# Patient Record
Sex: FEMALE | Race: WHITE | Employment: OTHER | URBAN - METROPOLITAN AREA
[De-identification: names, ages, dates, MRNs, and addresses within clinical notes are randomized per-mention and may not be internally consistent; named-entity substitution may affect disease eponyms.]

---

## 2017-03-03 ENCOUNTER — MYC MEDICAL ADVICE (OUTPATIENT)
Dept: NEUROLOGY | Facility: CLINIC | Age: 82
End: 2017-03-03

## 2017-03-07 ENCOUNTER — TRANSFERRED RECORDS (OUTPATIENT)
Dept: HEALTH INFORMATION MANAGEMENT | Facility: CLINIC | Age: 82
End: 2017-03-07

## 2017-06-06 ENCOUNTER — OFFICE VISIT (OUTPATIENT)
Dept: NEUROLOGY | Facility: CLINIC | Age: 82
End: 2017-06-06

## 2017-06-06 VITALS
DIASTOLIC BLOOD PRESSURE: 64 MMHG | HEART RATE: 56 BPM | BODY MASS INDEX: 32.87 KG/M2 | HEIGHT: 67 IN | WEIGHT: 209.4 LBS | SYSTOLIC BLOOD PRESSURE: 146 MMHG

## 2017-06-06 DIAGNOSIS — F02.80 ALZHEIMER'S DISEASE OF OTHER ONSET WITHOUT BEHAVIORAL DISTURBANCE: Primary | ICD-10-CM

## 2017-06-06 DIAGNOSIS — G30.8 ALZHEIMER'S DISEASE OF OTHER ONSET WITHOUT BEHAVIORAL DISTURBANCE: Primary | ICD-10-CM

## 2017-06-06 RX ORDER — MULTIVIT WITH MINERALS/LUTEIN
1000 TABLET ORAL 2 TIMES DAILY
Qty: 120 CAPSULE | Refills: 1 | Status: SHIPPED | OUTPATIENT
Start: 2017-06-06 | End: 2017-08-05

## 2017-06-06 NOTE — PROGRESS NOTES
Mrs. Craig is a very pleasant older lady with a diagnosis of mild cognitive impairment whom I last saw six months ago.  She was accompanied today by her  and her two daughters.  She is currently taking donepezil 10 mg a day; the family feels that although the medication had little effect at 5 mg a day, increase the dose was associated with improvement.  Mrs. Craig lives with her  in the family home in [place] and is in close contact with her two daughters who visit at least every week.  Her son about 2 miles away.  There have not been any dramatic changes since the last clinic visit through her  noted that she is more likely to become confused particularly when they engage in non routine activities.  Mrs. Craig is able to dress, take care of her own toilet and walk around without assistance; her  has taken over all the household activities including cooking.  The patient gets good sleep though she also takes a few naps each day.  She does not wander, has not fallen and has not experienced hallucinations or disturbing dreams.    Medications (in addition to the donepezil) include simvastatin, lisinopril, aspirin, diltiazem, Vit. D and other dietary supplements. She does not take Vit. E.    On exam, Mrs. Craig was very jolly and maintains conversations quite easily.  I performed a brief cognitive evaluation using the Kokman test on which she scored 23 out of a possible 38.  Most of the deficits were in short term recall and in mental computations.  She did not look parkinsonian and had no involuntary movements.  There was a mild action tremor in the left upper limb.  Hand dexterity was normal bilaterally. When asked to walk, she manoj easily from the chair and walked with a normal stride, and good postural stability.    Although Mrs. Craig was previously diagnosed as having mild cognitive impairment, my assessment and the results of her last CPT evaluation suggest that she is in  the mild-moderate dementia range.  I examined the brain MRI taken in May 2016 which shows mild generalized cortical atrophy, probable increased ventricular volume and white matter abnormalities in the centrum semiovale bilaterally and also periventricularly (especially at the apices of the lateral ventricles).     The pattern of her cognitive deficits is suggestive of Alzheimer disease type dementia. I discussed potential symptomatic treatment options with the family explaining that I did not see a benefit in substituting another cholinesterase inhibitor for the donepezil that she currently takes.  I suggested that she take vitamin E 1000 BID and recommended that they might also consider using coconut oil.  I explained the potential benefits of behaviors such as regular exercise and socialization.  Her  is currently able to provide all the care that she needs and he received additional support from his two daughters.  I would like to see patient again in 1 year.

## 2017-06-06 NOTE — MR AVS SNAPSHOT
"              After Visit Summary   6/6/2017    Chris Craig    MRN: [id number]           Patient Information     Date Of Birth          5/21/1934        Visit Information        Provider Department      6/6/2017 1:00 PM Magdi Fitch MD [facility]         Follow-ups after your visit        Who to contact     Please call your clinic at [phone] to:    Ask questions about your health    Make or cancel appointments    Discuss your medicines    Learn about your test results    Speak to your doctor   If you have compliments or concerns about an experience at your clinic, or if you wish to file a complaint, please contact [facility] Patient Relations at [phone] or email us at [email or web address]         Additional Information About Your Visit        MyChart Information     [employer] gives you secure access to your electronic health record. If you see a primary care provider, you can also send messages to your care team and make appointments. If you have questions, please call your primary care clinic.  If you do not have a primary care provider, please call [phone] and they will assist you.      [employer] is an electronic gateway that provides easy, online access to your medical records. With [employer] you can request a clinic appointment, read your test results, renew a prescription or communicate with your care team.     To access your existing account, please contact your [facility] or call [phone] for assistance.        Care EveryWhere ID     This is your Care EveryWhere ID. This could be used by other organizations to access your [facility] medical records  [id number]        Your Vitals Were     Pulse Height BMI (Body Mass Index)             56 5' 7\" (170.2 cm) 32.8 kg/m2          Blood Pressure from Last 3 Encounters:   06/06/17 146/64   11/22/16 160/67   10/21/16 181/74    Weight from Last 3 Encounters:   06/06/17 209 lb " 6.4 oz (95 kg)   11/22/16 214 lb 9.6 oz (97.3 kg)   10/21/16 210 lb (95.3 kg)              Today, you had the following     No orders found for display       Primary Care Provider Office Phone # Fax #    Ti Dale [phone]       [facility] [address]  [place] [address]        Thank you!     Thank you for choosing [facility]  for your care. Our goal is always to provide you with excellent care. Hearing back from our patients is one way we can continue to improve our services. Please take a few minutes to complete the written survey that you may receive in the mail after your visit with us. Thank you!             Your Updated Medication List - Protect others around you: Learn how to safely use, store and throw away your medicines at www.disposemymeds.org.          This list is accurate as of: 6/6/17  2:05 PM.  Always use your most recent med list.                   Brand Name Dispense Instructions for use    aspirin 325 MG tablet      Take 325 mg by mouth daily.       CALCIUM 600+D PO      Take 2 tablets by mouth daily       diltiazem 360 MG 24 hr CD capsule    CARDIZEM CD; CARTIA XT     Take 360 mg by mouth daily       donepezil 10 MG tablet    ARICEPT    90 tablet    Take 1 tablet (10 mg) by mouth At Bedtime       LISINOPRIL PO      Take 20 mg by mouth daily       SENIOR MULTIVITAMIN PLUS Tabs      Take 1 tablet by mouth daily       simvastatin 20 MG tablet    ZOCOR     Take 20 mg by mouth At Bedtime.       * VITAMIN D (CHOLECALCIFEROL) PO      Take 2,000 Units by mouth daily       * VITAMIN D PO      Take 50,000 Units by mouth every 30 days       * Notice:  This list has 2 medication(s) that are the same as other medications prescribed for you. Read the directions carefully, and ask your doctor or other care provider to review them with you.

## 2017-06-06 NOTE — LETTER
6/6/2017       RE: Chris Craig  [address]  [facility] [address]     Dear Colleague,    Thank you for referring your patient, Chris Craig, to the [facility] at [facility]. Please see a copy of my visit note below.    Mrs. Craig is a very pleasant older lady with a diagnosis of mild cognitive impairment whom I last saw six months ago.  She was accompanied today by her  and her two daughters.  She is currently taking donepezil 10 mg a day; the family feels that although the medication had little effect at 5 mg a day, increase the dose was associated with improvement.  Mrs. Craig lives with her  in the family home in [place] and is in close contact with her two daughters who visit at least every week.  Her son about 2 miles away.  There have not been any dramatic changes since the last clinic visit through her  noted that she is more likely to become confused particularly when they engage in non routine activities.  Mrs. Craig is able to dress, take care of her own toilet and walk around without assistance; her  has taken over all the household activities including cooking.  The patient gets good sleep though she also takes a few naps each day.  She does not wander, has not fallen and has not experienced hallucinations or disturbing dreams.    Medications (in addition to the donepezil) include simvastatin, lisinopril, aspirin, diltiazem, Vit. D and other dietary supplements. She does not take Vit. E.    On exam, Mrs. Craig was very jolly and maintains conversations quite easily.  I performed a brief cognitive evaluation using the Kokman test on which she scored 23 out of a possible 38.  Most of the deficits were in short term recall and in mental computations.  She did not look parkinsonian and had no involuntary movements.  There was a mild action tremor in the left upper limb.  Hand dexterity was normal  bilaterally. When asked to walk, she manoj easily from the chair and walked with a normal stride, and good postural stability.    Although Mrs. Craig was previously diagnosed as having mild cognitive impairment, my assessment and the results of her last CPT evaluation suggest that she is in the mild-moderate dementia range.  I examined the brain MRI taken in May 2016 which shows mild generalized cortical atrophy, probable increased ventricular volume and white matter abnormalities in the centrum semiovale bilaterally and also periventricularly (especially at the apices of the lateral ventricles).     The pattern of her cognitive deficits is suggestive of Alzheimer disease type dementia. I discussed potential symptomatic treatment options with the family explaining that I did not see a benefit in substituting another cholinesterase inhibitor for the donepezil that she currently takes.  I suggested that she take vitamin E 1000 BID and recommended that they might also consider using coconut oil.  I explained the potential benefits of behaviors such as regular exercise and socialization.  Her  is currently able to provide all the care that she needs and he received additional support from his two daughters.  I would like to see patient again in 1 year.    Magdi Fitch MD

## 2017-10-04 ASSESSMENT — ENCOUNTER SYMPTOMS
DEPRESSION: 0
HALLUCINATIONS: 0
HOT FLASHES: 0
POLYPHAGIA: 0
PANIC: 0
INCREASED ENERGY: 1
WEIGHT GAIN: 0
FATIGUE: 1
NIGHT SWEATS: 0
ALTERED TEMPERATURE REGULATION: 1
FEVER: 0
DYSURIA: 0
FLANK PAIN: 0
NERVOUS/ANXIOUS: 0
DIFFICULTY URINATING: 1
DECREASED LIBIDO: 0
DECREASED CONCENTRATION: 1
CHILLS: 1
HEMATURIA: 0
INSOMNIA: 0
DECREASED APPETITE: 0
WEIGHT LOSS: 0
POLYDIPSIA: 0

## 2017-10-05 DIAGNOSIS — G31.84 MCI (MILD COGNITIVE IMPAIRMENT): ICD-10-CM

## 2017-10-05 RX ORDER — DONEPEZIL HYDROCHLORIDE 10 MG/1
TABLET, FILM COATED ORAL
Qty: 30 TABLET | Refills: 0 | Status: SHIPPED | OUTPATIENT
Start: 2017-10-05 | End: 2020-01-01

## 2017-10-18 ENCOUNTER — OFFICE VISIT (OUTPATIENT)
Dept: UROLOGY | Facility: CLINIC | Age: 82
End: 2017-10-18
Payer: MEDICARE

## 2017-10-18 VITALS — BODY MASS INDEX: 32.18 KG/M2 | OXYGEN SATURATION: 97 % | HEART RATE: 60 BPM | HEIGHT: 67 IN | WEIGHT: 205 LBS

## 2017-10-18 DIAGNOSIS — R82.90 ABNORMAL URINE ODOR: Primary | ICD-10-CM

## 2017-10-18 LAB
ALBUMIN UR-MCNC: ABNORMAL MG/DL
APPEARANCE UR: CLEAR
BILIRUB UR QL STRIP: NEGATIVE
COLOR UR AUTO: YELLOW
GLUCOSE UR STRIP-MCNC: NEGATIVE MG/DL
HGB UR QL STRIP: NEGATIVE
KETONES UR STRIP-MCNC: NEGATIVE MG/DL
LEUKOCYTE ESTERASE UR QL STRIP: ABNORMAL
NITRATE UR QL: POSITIVE
PH UR STRIP: 7 PH (ref 5–7)
SOURCE: ABNORMAL
SP GR UR STRIP: 1.01 (ref 1–1.03)
UROBILINOGEN UR STRIP-ACNC: 2 EU/DL (ref 0.2–1)

## 2017-10-18 PROCEDURE — 99202 OFFICE O/P NEW SF 15 MIN: CPT | Performed by: PHYSICIAN ASSISTANT

## 2017-10-18 PROCEDURE — 87109 MYCOPLASMA: CPT | Performed by: PHYSICIAN ASSISTANT

## 2017-10-18 PROCEDURE — 87088 URINE BACTERIA CULTURE: CPT | Performed by: PHYSICIAN ASSISTANT

## 2017-10-18 PROCEDURE — 87186 SC STD MICRODIL/AGAR DIL: CPT | Performed by: PHYSICIAN ASSISTANT

## 2017-10-18 PROCEDURE — 87086 URINE CULTURE/COLONY COUNT: CPT | Performed by: PHYSICIAN ASSISTANT

## 2017-10-18 PROCEDURE — 81003 URINALYSIS AUTO W/O SCOPE: CPT | Mod: QW | Performed by: PHYSICIAN ASSISTANT

## 2017-10-18 ASSESSMENT — PAIN SCALES - GENERAL: PAINLEVEL: NO PAIN (0)

## 2017-10-18 NOTE — PROGRESS NOTES
October 18, 2017    CC: Foul-smelling urine    HPI:  Chris Craig is a pleasant 83 year old female who presents via referral from Dr. Dale with foul-smelling urine.  She was recently seen by Dr. dale on 824/16 and treated for a urinary tract infection with ciprofloxacin for seven days.  Patient is present with her  today who aids in taking her history, given her short-term memory loss issues.    Patient herself has no concerns today.  No dysuria, hematuria, frequency, nocturia.   notes a foul odor and discharge in her panty liner daily.  She drinks 3 cups of coffee in the morning and just enough water to take her pills.  Has not had a gynecologic exam for many years and had a hysterectomy many years ago.      Past Medical History:   Diagnosis Date     Arthritis      Complication of anesthesia      H/O transfusion     but not entire;y sure     Hypertension      Mumps        Past Surgical History:   Procedure Laterality Date     BLADDER SURGERY       BUNIONECTOMY RT/LT       C OPEN RX ANKLE DISLOCATN+FIXATN      right     COLONOSCOPY       COLONOSCOPY  3/13/2012    Procedure:COLONOSCOPY; COLONOSCOPY ; Surgeon:LUCAS MOODY; Location: GI     GYN SURGERY      hysterectomy     HC REMOVAL OF TONSILS,<13 Y/O       OPEN REDUCTION INTERNAL FIXATION ANKLE Right 3/30/2016    Procedure: OPEN REDUCTION INTERNAL FIXATION ANKLE;  Surgeon: Denis Gaffney MD;  Location:  OR       Social History     Social History     Marital status:      Spouse name: N/A     Number of children: N/A     Years of education: N/A     Occupational History     Not on file.     Social History Main Topics     Smoking status: Never Smoker     Smokeless tobacco: Never Used     Alcohol use 0.0 oz/week     0 Standard drinks or equivalent per week      Comment: one drink per week     Drug use: No     Sexual activity: No     Other Topics Concern     Not on file     Social History Narrative       History reviewed. No  "pertinent family history.    ROS:14 point ROS neg other than the symptoms noted above in the HPI.    Allergies   Allergen Reactions     Sulfa Drugs      hives       Current Outpatient Prescriptions   Medication     donepezil (ARICEPT) 10 MG tablet     LISINOPRIL PO     VITAMIN D, CHOLECALCIFEROL, PO     aspirin 325 MG tablet     Multiple Vitamins-Minerals (SENIOR MULTIVITAMIN PLUS) TABS     Calcium Carbonate-Vitamin D (CALCIUM 600+D PO)     VITAMIN D PO     simvastatin (ZOCOR) 20 MG tablet     diltiazem (CARDIZEM CD; CARTIA XT) 360 MG 24 hr CD capsule     No current facility-administered medications for this visit.          PEx:   Pulse 60, height 1.702 m (5' 7\"), weight 93 kg (205 lb), SpO2 97 %, not currently breastfeeding.  5' 7\", Body mass index is 32.11 kg/(m^2)., 205 lbs 0 oz  Gen appearance:  Well groomed,: age-appropriate appearing female in NAD.   HEENT:  EOMI, AT NC, CN grossly normal  Psych:  alert , comfortable in no acute distress  Neuro:  A/O X 3  Skin:  Warm to touch, clear of rashes, ecchymoses  Resp:  No increased respiratory effort  lymph:  No LE edema  Abd:  Soft/NT, ND, no palpable masses, no CVAT  Back: bony spine is non-tender, flanks are nontender    Urine: Nitrite positive, small leukoesterase, negative blood.      A/P: Chris Craig is a 83 year old female with foul-smelling urine.  Await culture, Mycoplasma and Ureaplasma. Hydrate.   If negative, would suggest she see primary care gynecologist for vaginal exam.  Patient  seemed happy with this.  Will notify them as results finalize.    Rose Martinez PA-C  [facility]    20 minutes were spent with the patient today, > 50% in counseling and coordination of care                "

## 2017-10-18 NOTE — MR AVS SNAPSHOT
After Visit Summary   10/18/2017    Chris Craig    MRN: [id number]           Patient Information     Date Of Birth          5/21/1934        Visit Information        Provider Department      10/18/2017 3:00 PM Rose Martinez PA-C [facility] [place]        Today's Diagnoses     Abnormal urine odor    -  1       Follow-ups after your visit        Your next 10 appointments already scheduled     Feb 09, 2018  1:00 PM CST   RETURN EXTENDED with Natalio Gray MD   [facility] ([facility])    [address]  [address] [place] [address] [phone]              Who to contact     If you have questions or need follow up information about today's clinic visit or your schedule please contact [facility] [place] directly at [phone].  Normal or non-critical lab and imaging results will be communicated to you by MyChart, letter or phone within 4 business days after the clinic has received the results. If you do not hear from us within 7 days, please contact the clinic through MyChart or phone. If you have a critical or abnormal lab result, we will notify you by phone as soon as possible.  Submit refill requests through [employer] or call your pharmacy and they will forward the refill request to us. Please allow 3 business days for your refill to be completed.          Additional Information About Your Visit        MyChart Information     [employer] gives you secure access to your electronic health record. If you see a primary care provider, you can also send messages to your care team and make appointments. If you have questions, please call your primary care clinic.  If you do not have a primary care provider, please call [phone] and they will assist you.        Care EveryWhere ID     This is your Care EveryWhere ID. This could be used by other organizations to access your [facility]  "medical records  [id number]        Your Vitals Were     Pulse Height Pulse Oximetry BMI (Body Mass Index)          60 1.702 m (5' 7\") 97% 32.11 kg/m2         Blood Pressure from Last 3 Encounters:   06/06/17 146/64   11/22/16 160/67   10/21/16 181/74    Weight from Last 3 Encounters:   10/18/17 93 kg (205 lb)   06/06/17 95 kg (209 lb 6.4 oz)   11/22/16 97.3 kg (214 lb 9.6 oz)              We Performed the Following     Mycoplasma large colony culture [[id number]]     UA without Microscopic     Ureaplasma culture [[id number]]     Urine Culture Aerobic Bacterial [[id number]]        Primary Care Provider Office Phone # Fax #    Ti Dale [phone] [address] [facility]   [place] [address]        Equal Access to Services     [facility]: Hadii aad blayne hadasho Socezar, waaxda luqadaha, qaybta kaalmada adeegyada, florentino aponte hayearline farias . So [facility] [phone].    ATENCIÓN: Si habla español, tiene a van disposición servicios gratuitos de asistencia lingüística. Llame al [phone].    We comply with applicable federal civil rights laws and [place] laws. We do not discriminate on the basis of race, color, national origin, age, disability, sex, sexual orientation, or gender identity.            Thank you!     Thank you for choosing [facility] [place]  for your care. Our goal is always to provide you with excellent care. Hearing back from our patients is one way we can continue to improve our services. Please take a few minutes to complete the written survey that you may receive in the mail after your visit with us. Thank you!             Your Updated Medication List - Protect others around you: Learn how to safely use, store and throw away your medicines at www.disposemymeds.org.          This list is accurate as of: 10/18/17  3:19 PM.  Always use your most recent med list.                   Brand Name Dispense Instructions for use " Diagnosis    aspirin 325 MG tablet      Take 325 mg by mouth daily.        CALCIUM 600+D PO      Take 2 tablets by mouth daily        diltiazem 360 MG 24 hr CD capsule    CARDIZEM CD; CARTIA XT     Take 360 mg by mouth daily        donepezil 10 MG tablet    ARICEPT    30 tablet    TAKE 1 TABLET (10 MG) BY MOUTH AT BEDTIME    MCI (mild cognitive impairment)       LISINOPRIL PO      Take 20 mg by mouth daily        SENIOR MULTIVITAMIN PLUS Tabs      Take 1 tablet by mouth daily        simvastatin 20 MG tablet    ZOCOR     Take 20 mg by mouth At Bedtime.        * VITAMIN D (CHOLECALCIFEROL) PO      Take 2,000 Units by mouth daily        * VITAMIN D PO      Take 50,000 Units by mouth every 30 days        * Notice:  This list has 2 medication(s) that are the same as other medications prescribed for you. Read the directions carefully, and ask your doctor or other care provider to review them with you.

## 2017-10-18 NOTE — LETTER
10/18/2017       RE: Chris Craig  [address]  [place] [address]     Dear Colleague,    Thank you for referring your patient, Chris Craig, to the [facility] [place] at [facility]. Please see a copy of my visit note below.    October 18, 2017    CC: Foul-smelling urine    HPI:  Chris Craig is a pleasant 83 year old female who presents via referral from Dr. Dale with foul-smelling urine.  She was recently seen by Dr. dale on 824/16 and treated for a urinary tract infection with ciprofloxacin for seven days.  Patient is present with her  today who aids in taking her history, given her short-term memory loss issues.    Patient herself has no concerns today.  No dysuria, hematuria, frequency, nocturia.   notes a foul odor and discharge in her panty liner daily.  She drinks 3 cups of coffee in the morning and just enough water to take her pills.  Has not had a gynecologic exam for many years and had a hysterectomy many years ago.      Past Medical History:   Diagnosis Date     Arthritis      Complication of anesthesia      H/O transfusion     but not entire;y sure     Hypertension      Mumps        Past Surgical History:   Procedure Laterality Date     BLADDER SURGERY       BUNIONECTOMY RT/LT       C OPEN RX ANKLE DISLOCATN+FIXATN      right     COLONOSCOPY       COLONOSCOPY  3/13/2012    Procedure:COLONOSCOPY; COLONOSCOPY ; Surgeon:LUCAS MOODY; Location: GI     GYN SURGERY      hysterectomy     HC REMOVAL OF TONSILS,<13 Y/O       OPEN REDUCTION INTERNAL FIXATION ANKLE Right 3/30/2016    Procedure: OPEN REDUCTION INTERNAL FIXATION ANKLE;  Surgeon: Denis Gaffney MD;  Location:  OR       Social History     Social History     Marital status:      Spouse name: N/A     Number of children: N/A     Years of education: N/A     Occupational History     Not on file.     Social  "History Main Topics     Smoking status: Never Smoker     Smokeless tobacco: Never Used     Alcohol use 0.0 oz/week     0 Standard drinks or equivalent per week      Comment: one drink per week     Drug use: No     Sexual activity: No     Other Topics Concern     Not on file     Social History Narrative       History reviewed. No pertinent family history.    ROS:14 point ROS neg other than the symptoms noted above in the HPI.    Allergies   Allergen Reactions     Sulfa Drugs      hives       Current Outpatient Prescriptions   Medication     donepezil (ARICEPT) 10 MG tablet     LISINOPRIL PO     VITAMIN D, CHOLECALCIFEROL, PO     aspirin 325 MG tablet     Multiple Vitamins-Minerals (SENIOR MULTIVITAMIN PLUS) TABS     Calcium Carbonate-Vitamin D (CALCIUM 600+D PO)     VITAMIN D PO     simvastatin (ZOCOR) 20 MG tablet     diltiazem (CARDIZEM CD; CARTIA XT) 360 MG 24 hr CD capsule     No current facility-administered medications for this visit.          PEx:   Pulse 60, height 1.702 m (5' 7\"), weight 93 kg (205 lb), SpO2 97 %, not currently breastfeeding.  5' 7\", Body mass index is 32.11 kg/(m^2)., 205 lbs 0 oz  Gen appearance:  Well groomed,: age-appropriate appearing female in NAD.   HEENT:  EOMI, AT NC, CN grossly normal  Psych:  alert , comfortable in no acute distress  Neuro:  A/O X 3  Skin:  Warm to touch, clear of rashes, ecchymoses  Resp:  No increased respiratory effort  lymph:  No LE edema  Abd:  Soft/NT, ND, no palpable masses, no CVAT  Back: bony spine is non-tender, flanks are nontender    Urine: Nitrite positive, small leukoesterase, negative blood.      A/P: Chris MINOO Craig is a 83 year old female with foul-smelling urine.  Await culture, Mycoplasma and Ureaplasma. Hydrate.   If negative, would suggest she see primary care gynecologist for vaginal exam.  Patient  seemed happy with this.  Will notify them as results finalize.    Rose Martinez PA-C  [facility]    20 minutes were spent with the " patient today, > 50% in counseling and coordination of care      Again, thank you for allowing me to participate in the care of your patient.      Sincerely,    Rose Martinez PA-C, ANJELICA

## 2017-10-20 DIAGNOSIS — N39.0 URINARY TRACT INFECTION: Primary | ICD-10-CM

## 2017-10-20 LAB
BACTERIA SPEC CULT: ABNORMAL
BACTERIA SPEC CULT: ABNORMAL
Lab: ABNORMAL
SPECIMEN SOURCE: ABNORMAL

## 2017-10-20 RX ORDER — CIPROFLOXACIN 500 MG/1
500 TABLET, FILM COATED ORAL 2 TIMES DAILY
Qty: 14 TABLET | Refills: 0 | Status: SHIPPED | OUTPATIENT
Start: 2017-10-20 | End: 2017-10-27

## 2017-10-25 DIAGNOSIS — A64 STI (SEXUALLY TRANSMITTED INFECTION): Primary | ICD-10-CM

## 2017-10-25 LAB
BACTERIA SPEC CULT: ABNORMAL
BACTERIA SPEC CULT: NORMAL
Lab: ABNORMAL
Lab: NORMAL
SPECIMEN SOURCE: ABNORMAL
SPECIMEN SOURCE: NORMAL

## 2017-10-25 RX ORDER — DOXYCYCLINE 100 MG/1
100 TABLET ORAL 2 TIMES DAILY
Qty: 20 TABLET | Refills: 0 | Status: SHIPPED | OUTPATIENT
Start: 2017-10-25 | End: 2017-11-04

## 2017-10-25 NOTE — PROGRESS NOTES
Called patient and informed her of ureaplasma infection and sent ABT to patient's preferred pharmacy. She was then transferred to the  to schedule a follow-up appointment. Naomi Fagan LPN

## 2017-11-22 ENCOUNTER — OFFICE VISIT (OUTPATIENT)
Dept: UROLOGY | Facility: CLINIC | Age: 82
End: 2017-11-22
Payer: MEDICARE

## 2017-11-22 VITALS — OXYGEN SATURATION: 97 % | HEART RATE: 54 BPM | HEIGHT: 67 IN | WEIGHT: 209 LBS | BODY MASS INDEX: 32.8 KG/M2

## 2017-11-22 DIAGNOSIS — R82.90 ABNORMAL URINE ODOR: Primary | ICD-10-CM

## 2017-11-22 LAB
ALBUMIN UR-MCNC: NEGATIVE MG/DL
APPEARANCE UR: CLEAR
BILIRUB UR QL STRIP: NEGATIVE
COLOR UR AUTO: YELLOW
GLUCOSE UR STRIP-MCNC: NEGATIVE MG/DL
HGB UR QL STRIP: NEGATIVE
KETONES UR STRIP-MCNC: NEGATIVE MG/DL
LEUKOCYTE ESTERASE UR QL STRIP: NEGATIVE
NITRATE UR QL: NEGATIVE
PH UR STRIP: 7.5 PH (ref 5–7)
SOURCE: ABNORMAL
SP GR UR STRIP: 1.01 (ref 1–1.03)
UROBILINOGEN UR STRIP-ACNC: 1 EU/DL (ref 0.2–1)

## 2017-11-22 PROCEDURE — 99212 OFFICE O/P EST SF 10 MIN: CPT | Performed by: PHYSICIAN ASSISTANT

## 2017-11-22 PROCEDURE — 81003 URINALYSIS AUTO W/O SCOPE: CPT | Mod: QW | Performed by: PHYSICIAN ASSISTANT

## 2017-11-22 ASSESSMENT — PAIN SCALES - GENERAL: PAINLEVEL: NO PAIN (0)

## 2017-11-22 NOTE — LETTER
"11/22/2017     RE: Chris Craig  [address]  [facility] [address]     Dear Colleague,    Thank you for referring your patient, Chris Craig, to the [facility]OLOG[facility] [place] at [facility]. Please see a copy of my visit note below.    October 18, 2017    CC: Foul-smelling urine    HPI:  Chris Craig is a pleasant 83 year old female who presents via follow up of \"foul-smelling urine\".  She was recently seen by Dr. caraballo on 8/24/16 and treated for a urinary tract infection with ciprofloxacin for seven days.  Patient is present with her  today who aids in taking her history, given her short-term memory loss issues.    Patient herself has no concerns today.  Was treated for Ureaplasma and E coli UTI in the interim. No dysuria, hematuria, frequency, nocturia.   notes a foul odor intermittently.  She drinks 3 cups of coffee in the morning and just enough water to take her pills.  Has not had a gynecologic exam for many years and had a hysterectomy many years ago.      Past Medical History:   Diagnosis Date     Arthritis      Complication of anesthesia      H/O transfusion     but not entire;y sure     Hypertension      Mumps        Past Surgical History:   Procedure Laterality Date     BLADDER SURGERY       BUNIONECTOMY RT/LT       C OPEN RX ANKLE DISLOCATN+FIXATN      right     COLONOSCOPY       COLONOSCOPY  3/13/2012    Procedure:COLONOSCOPY; COLONOSCOPY ; Surgeon:LUCAS MOODY; Location: GI     GYN SURGERY      hysterectomy     HC REMOVAL OF TONSILS,<11 Y/O       OPEN REDUCTION INTERNAL FIXATION ANKLE Right 3/30/2016    Procedure: OPEN REDUCTION INTERNAL FIXATION ANKLE;  Surgeon: Denis Gaffney MD;  Location:  OR       Social History     Social History     Marital status:      Spouse name: N/A     Number of children: N/A     Years of education: N/A     Occupational History     Not on " "file.     Social History Main Topics     Smoking status: Never Smoker     Smokeless tobacco: Never Used     Alcohol use 0.0 oz/week     0 Standard drinks or equivalent per week      Comment: one drink per week     Drug use: No     Sexual activity: No     Other Topics Concern     Not on file     Social History Narrative       History reviewed. No pertinent family history.    ROS:14 point ROS neg other than the symptoms noted above in the HPI.    Allergies   Allergen Reactions     Sulfa Drugs      hives       Current Outpatient Prescriptions   Medication     donepezil (ARICEPT) 10 MG tablet     LISINOPRIL PO     VITAMIN D, CHOLECALCIFEROL, PO     aspirin 325 MG tablet     Multiple Vitamins-Minerals (SENIOR MULTIVITAMIN PLUS) TABS     Calcium Carbonate-Vitamin D (CALCIUM 600+D PO)     VITAMIN D PO     simvastatin (ZOCOR) 20 MG tablet     diltiazem (CARDIZEM CD; CARTIA XT) 360 MG 24 hr CD capsule     No current facility-administered medications for this visit.          PEx:   Pulse 54, height 1.702 m (5' 7\"), weight 94.8 kg (209 lb), SpO2 97 %, not currently breastfeeding.  5' 7\", Body mass index is 32.73 kg/(m^2)., 209 lbs 0 oz  Gen appearance:  Well groomed,: age-appropriate appearing female in NAD.   HEENT:  EOMI, AT NC, CN grossly normal  Psych:  alert , comfortable in no acute distress  Neuro:  A/O X 3  Skin:  Warm to touch, clear of rashes, ecchymoses  Resp:  No increased respiratory effort  lymph:  No LE edema  Abd:  Soft/NT, ND, no palpable masses, no CVAT  Back: bony spine is non-tender, flanks are nontender    Urine: negative      A/P: Chris MINOO Craig is a 83 year old female with foul-smelling odor.  Hydrate, concentrated urine may be some of the issue.   Would suggest she see primary care or gynecologist for vaginal exam.  Patient  seemed happy with this.  Will see them prn.    Rose Martinez PA-C  [facility]    20 minutes were spent with the patient today, > 50% in counseling and coordination " of care    Again, thank you for allowing me to participate in the care of your patient.      Sincerely,    Rose Martinez PA-C

## 2017-11-22 NOTE — MR AVS SNAPSHOT
After Visit Summary   11/22/2017    Chris Craig    MRN: [id number]           Patient Information     Date Of Birth          5/21/1934        Visit Information        Provider Department      11/22/2017 1:00 PM Rose Martinez PA-C [facility] [place]        Today's Diagnoses     Abnormal urine odor    -  1       Follow-ups after your visit        Your next 10 appointments already scheduled     Feb 09, 2018  1:30 PM CST   RETURN EXTENDED with Natalio Gray MD   [facility]UROSPECIALT[facility] ([facility])    [address] [facility] [address]  [address] [place] [address] [phone]              Who to contact     If you have questions or need follow up information about today's clinic visit or your schedule please contact [facility]OLOG[facility] CLINIC [place] directly at [phone].  Normal or non-critical lab and imaging results will be communicated to you by MyChart, letter or phone within 4 business days after the clinic has received the results. If you do not hear from us within 7 days, please contact the clinic through MyChart or phone. If you have a critical or abnormal lab result, we will notify you by phone as soon as possible.  Submit refill requests through [employer] or call your pharmacy and they will forward the refill request to us. Please allow 3 business days for your refill to be completed.          Additional Information About Your Visit        MyChart Information     [employer] gives you secure access to your electronic health record. If you see a primary care provider, you can also send messages to your care team and make appointments. If you have questions, please call your primary care clinic.  If you do not have a primary care provider, please call [phone] and they will assist you.        Care EveryWhere ID     This is your Care EveryWhere ID. This could be used by other organizations to access your [place]  "medical records  [id number]        Your Vitals Were     Pulse Height Pulse Oximetry BMI (Body Mass Index)          54 1.702 m (5' 7\") 97% 32.73 kg/m2         Blood Pressure from Last 3 Encounters:   06/06/17 146/64   11/22/16 160/67   10/21/16 181/74    Weight from Last 3 Encounters:   11/22/17 94.8 kg (209 lb)   10/18/17 93 kg (205 lb)   06/06/17 95 kg (209 lb 6.4 oz)              We Performed the Following     UA without Microscopic        Primary Care Provider Office Phone # Fax #    [place] [phone] [address] [address]        Equal Access to Services     SUNITA VENTURA : Hadii neda prietoo Socezar, waaxda luqadaha, qaybta kaalmada adeegyada, florentino farias . So [facility] [phone].    ATENCIÓN: Si habla español, tiene a van disposición servicios gratuitos de asistencia lingüística. Llame al [phone].    We comply with applicable federal civil rights laws and [place] laws. We do not discriminate on the basis of race, color, national origin, age, disability, sex, sexual orientation, or gender identity.            Thank you!     Thank you for choosing [facility]OLOG[facility] [place]  for your care. Our goal is always to provide you with excellent care. Hearing back from our patients is one way we can continue to improve our services. Please take a few minutes to complete the written survey that you may receive in the mail after your visit with us. Thank you!             Your Updated Medication List - Protect others around you: Learn how to safely use, store and throw away your medicines at www.disposemymeds.org.          This list is accurate as of: 11/22/17  1:13 PM.  Always use your most recent med list.                   Brand Name Dispense Instructions for use Diagnosis    aspirin 325 MG tablet      Take 325 mg by mouth daily.        CALCIUM 600+D PO      Take 2 tablets by mouth daily        " diltiazem 360 MG 24 hr CD capsule    CARDIZEM CD; CARTIA XT     Take 360 mg by mouth daily        donepezil 10 MG tablet    ARICEPT    30 tablet    TAKE 1 TABLET (10 MG) BY MOUTH AT BEDTIME    MCI (mild cognitive impairment)       LISINOPRIL PO      Take 20 mg by mouth daily        SENIOR MULTIVITAMIN PLUS Tabs      Take 1 tablet by mouth daily        simvastatin 20 MG tablet    ZOCOR     Take 20 mg by mouth At Bedtime.        * VITAMIN D (CHOLECALCIFEROL) PO      Take 2,000 Units by mouth daily        * VITAMIN D PO      Take 50,000 Units by mouth every 30 days        * Notice:  This list has 2 medication(s) that are the same as other medications prescribed for you. Read the directions carefully, and ask your doctor or other care provider to review them with you.

## 2017-11-22 NOTE — NURSING NOTE
pts  states pt is still having smelly urine.  Pts  states she doesn't drink much water.  ANUP Pitts, CMA

## 2017-11-22 NOTE — PROGRESS NOTES
"October 18, 2017    CC: Foul-smelling urine    HPI:  Chris Craig is a pleasant 83 year old female who presents via follow up of \"foul-smelling urine\".  She was recently seen by Dr. caraballo on 8/24/16 and treated for a urinary tract infection with ciprofloxacin for seven days.  Patient is present with her  today who aids in taking her history, given her short-term memory loss issues.    Patient herself has no concerns today.  Was treated for Ureaplasma and E coli UTI in the interim. No dysuria, hematuria, frequency, nocturia.   notes a foul odor intermittently.  She drinks 3 cups of coffee in the morning and just enough water to take her pills.  Has not had a gynecologic exam for many years and had a hysterectomy many years ago.      Past Medical History:   Diagnosis Date     Arthritis      Complication of anesthesia      H/O transfusion     but not entire;y sure     Hypertension      Mumps        Past Surgical History:   Procedure Laterality Date     BLADDER SURGERY       BUNIONECTOMY RT/LT       C OPEN RX ANKLE DISLOCATN+FIXATN      right     COLONOSCOPY       COLONOSCOPY  3/13/2012    Procedure:COLONOSCOPY; COLONOSCOPY ; Surgeon:LUCAS MOODY; Location: GI     GYN SURGERY      hysterectomy     HC REMOVAL OF TONSILS,<11 Y/O       OPEN REDUCTION INTERNAL FIXATION ANKLE Right 3/30/2016    Procedure: OPEN REDUCTION INTERNAL FIXATION ANKLE;  Surgeon: Denis Gaffney MD;  Location:  OR       Social History     Social History     Marital status:      Spouse name: N/A     Number of children: N/A     Years of education: N/A     Occupational History     Not on file.     Social History Main Topics     Smoking status: Never Smoker     Smokeless tobacco: Never Used     Alcohol use 0.0 oz/week     0 Standard drinks or equivalent per week      Comment: one drink per week     Drug use: No     Sexual activity: No     Other Topics Concern     Not on file     Social History Narrative " "      History reviewed. No pertinent family history.    ROS:14 point ROS neg other than the symptoms noted above in the HPI.    Allergies   Allergen Reactions     Sulfa Drugs      hives       Current Outpatient Prescriptions   Medication     donepezil (ARICEPT) 10 MG tablet     LISINOPRIL PO     VITAMIN D, CHOLECALCIFEROL, PO     aspirin 325 MG tablet     Multiple Vitamins-Minerals (SENIOR MULTIVITAMIN PLUS) TABS     Calcium Carbonate-Vitamin D (CALCIUM 600+D PO)     VITAMIN D PO     simvastatin (ZOCOR) 20 MG tablet     diltiazem (CARDIZEM CD; CARTIA XT) 360 MG 24 hr CD capsule     No current facility-administered medications for this visit.          PEx:   Pulse 54, height 1.702 m (5' 7\"), weight 94.8 kg (209 lb), SpO2 97 %, not currently breastfeeding.  5' 7\", Body mass index is 32.73 kg/(m^2)., 209 lbs 0 oz  Gen appearance:  Well groomed,: age-appropriate appearing female in NAD.   HEENT:  EOMI, AT NC, CN grossly normal  Psych:  alert , comfortable in no acute distress  Neuro:  A/O X 3  Skin:  Warm to touch, clear of rashes, ecchymoses  Resp:  No increased respiratory effort  lymph:  No LE edema  Abd:  Soft/NT, ND, no palpable masses, no CVAT  Back: bony spine is non-tender, flanks are nontender    Urine: negative      A/P: Chris MINOO Craig is a 83 year old female with foul-smelling odor.  Hydrate, concentrated urine may be some of the issue.   Would suggest she see primary care or gynecologist for vaginal exam.  Patient  seemed happy with this.  Will see them prn.    Rose Martinez PA-C  [facility]    20 minutes were spent with the patient today, > 50% in counseling and coordination of care                "

## 2017-12-06 ENCOUNTER — OFFICE VISIT (OUTPATIENT)
Dept: OBGYN | Facility: CLINIC | Age: 82
End: 2017-12-06
Payer: MEDICARE

## 2017-12-06 VITALS
SYSTOLIC BLOOD PRESSURE: 134 MMHG | HEART RATE: 52 BPM | HEIGHT: 67 IN | DIASTOLIC BLOOD PRESSURE: 64 MMHG | WEIGHT: 207.7 LBS | BODY MASS INDEX: 32.6 KG/M2

## 2017-12-06 DIAGNOSIS — N89.8 VAGINAL ODOR: Primary | ICD-10-CM

## 2017-12-06 LAB
SPECIMEN SOURCE: NORMAL
WET PREP SPEC: NORMAL

## 2017-12-06 PROCEDURE — 99203 OFFICE O/P NEW LOW 30 MIN: CPT | Performed by: ADVANCED PRACTICE MIDWIFE

## 2017-12-06 PROCEDURE — 87210 SMEAR WET MOUNT SALINE/INK: CPT | Performed by: ADVANCED PRACTICE MIDWIFE

## 2017-12-06 NOTE — MR AVS SNAPSHOT
After Visit Summary   12/6/2017    Chris Craig    MRN: [id number]           Patient Information     Date Of Birth          5/21/1934        Visit Information        Provider Department      12/6/2017 9:15 AM Shandra Alcala APRN CNM [facility]        Today's Diagnoses     Vaginal odor    -  1       Follow-ups after your visit        Follow-up notes from your care team     Return if symptoms worsen or fail to improve.      Your next 10 appointments already scheduled     Feb 09, 2018  1:30 PM CST   RETURN EXTENDED with Natalio Gray MD   [facility]UROSPECIALT[facility] ([facility])    [address]  [address]  [facility] [address] [phone]              Who to contact     If you have questions or need follow up information about today's clinic visit or your schedule please contact [facility] directly at [phone].  Normal or non-critical lab and imaging results will be communicated to you by MyChart, letter or phone within 4 business days after the clinic has received the results. If you do not hear from us within 7 days, please contact the clinic through MyChart or phone. If you have a critical or abnormal lab result, we will notify you by phone as soon as possible.  Submit refill requests through [employer] or call your pharmacy and they will forward the refill request to us. Please allow 3 business days for your refill to be completed.          Additional Information About Your Visit        MyChart Information     [employer] gives you secure access to your electronic health record. If you see a primary care provider, you can also send messages to your care team and make appointments. If you have questions, please call your primary care clinic.  If you do not have a primary care provider, please call [phone] and they will assist you.        Care EveryWhere ID     This is your Care EveryWhere ID. This could be used by other  "organizations to access your [place] medical records  [id number]        Your Vitals Were     Pulse Height BMI (Body Mass Index)             52 5' 7\" (1.702 m) 32.53 kg/m2          Blood Pressure from Last 3 Encounters:   12/06/17 134/64   06/06/17 146/64   11/22/16 160/67    Weight from Last 3 Encounters:   12/06/17 207 lb 11.2 oz (94.2 kg)   11/22/17 209 lb (94.8 kg)   10/18/17 205 lb (93 kg)              We Performed the Following     Wet prep        Primary Care Provider Office Phone # Fax #    [place] [phone] [address] [address]        Equal Access to Services     BERLIN VENTURA : Hadii neda cisneros hadasho Socezar, waaxda luqadaha, qaybta kaalmada adeegyada, florentino farias . So [facility] [phone].    ATENCIÓN: Si habla español, tiene a van disposición servicios gratuitos de asistencia lingüística. Llame al [phone].    We comply with applicable federal civil rights laws and [place] laws. We do not discriminate on the basis of race, color, national origin, age, disability, sex, sexual orientation, or gender identity.            Thank you!     Thank you for choosing [facility]  for your care. Our goal is always to provide you with excellent care. Hearing back from our patients is one way we can continue to improve our services. Please take a few minutes to complete the written survey that you may receive in the mail after your visit with us. Thank you!             Your Updated Medication List - Protect others around you: Learn how to safely use, store and throw away your medicines at www.disposemymeds.org.          This list is accurate as of: 12/6/17  9:44 AM.  Always use your most recent med list.                   Brand Name Dispense Instructions for use Diagnosis    aspirin 325 MG tablet      Take 325 mg by mouth daily.        CALCIUM 600+D PO      Take 2 tablets by mouth daily        diltiazem 360 MG 24 " hr CD capsule    CARDIZEM CD; CARTIA XT     Take 180 mg by mouth daily        donepezil 10 MG tablet    ARICEPT    30 tablet    TAKE 1 TABLET (10 MG) BY MOUTH AT BEDTIME    MCI (mild cognitive impairment)       LISINOPRIL PO      Take 20 mg by mouth daily        SENIOR MULTIVITAMIN PLUS Tabs      Take 1 tablet by mouth daily        simvastatin 20 MG tablet    ZOCOR     Take 20 mg by mouth At Bedtime.        * VITAMIN D (CHOLECALCIFEROL) PO      Take 2,000 Units by mouth daily        * VITAMIN D PO      Take 50,000 Units by mouth every 30 days        * Notice:  This list has 2 medication(s) that are the same as other medications prescribed for you. Read the directions carefully, and ask your doctor or other care provider to review them with you.

## 2017-12-06 NOTE — NURSING NOTE
"Chief Complaint   Patient presents with     Vaginal Problem     Discharge, odor       Initial /64  Pulse 52  Ht 5' 7\" (1.702 m)  Wt 207 lb 11.2 oz (94.2 kg)  BMI 32.53 kg/m2 Estimated body mass index is 32.53 kg/(m^2) as calculated from the following:    Height as of this encounter: 5' 7\" (1.702 m).    Weight as of this encounter: 207 lb 11.2 oz (94.2 kg).  Medication Reconciliation: complete     Delfina Hampton, DANUTA      "

## 2017-12-06 NOTE — PROGRESS NOTES
SUBJECTIVE:                                                   Chris Craig is a 83 year old who presents to clinic today for the following health issue(s):  Patient presents with:  Vaginal Problem: Discharge, odor      HPI:  Chris presents with her , Shaun, reporting vaginal odor. She reports that this odor has been going on for about a year. She went to urology first because she thought it was a urinary odor. She was cleared by urology, no issues found. She denies vaginal discharge, itching, or burning. She normally does not wear any pads/depends. Only wears them if she will be far from a bathroom for an extended period of time. She denies significant incontinence.     No LMP recorded. Patient has had a hysterectomy.  Menstrual History: post-menopausal.  Patient is not sexually active  No obstetric history on file..    Last PHQ-9 score on record = No flowsheet data found.  Last GAD7 score on record = No flowsheet data found.  Alcohol Score = none    Problem list and histories reviewed & adjusted, as indicated.  Additional history: as documented.    Patient Active Problem List   Diagnosis     Post-op pain     ACP (advance care planning)     MCI (mild cognitive impairment)     Past Surgical History:   Procedure Laterality Date     BLADDER SURGERY       BUNIONECTOMY RT/LT       C OPEN RX ANKLE DISLOCATN+FIXATN      right     COLONOSCOPY       COLONOSCOPY  3/13/2012    Procedure:COLONOSCOPY; COLONOSCOPY ; Surgeon:LUCAS MOODY; Location: GI     GYN SURGERY      hysterectomy     HC REMOVAL OF TONSILS,<13 Y/O       OPEN REDUCTION INTERNAL FIXATION ANKLE Right 3/30/2016    Procedure: OPEN REDUCTION INTERNAL FIXATION ANKLE;  Surgeon: Denis Gaffney MD;  Location:  OR      Social History   Substance Use Topics     Smoking status: Never Smoker     Smokeless tobacco: Never Used     Alcohol use 0.0 oz/week     0 Standard drinks or equivalent per week      Comment: one drink per week        "    Current Outpatient Prescriptions   Medication Sig     donepezil (ARICEPT) 10 MG tablet TAKE 1 TABLET (10 MG) BY MOUTH AT BEDTIME     LISINOPRIL PO Take 20 mg by mouth daily     VITAMIN D, CHOLECALCIFEROL, PO Take 2,000 Units by mouth daily     aspirin 325 MG tablet Take 325 mg by mouth daily.     Multiple Vitamins-Minerals (SENIOR MULTIVITAMIN PLUS) TABS Take 1 tablet by mouth daily      Calcium Carbonate-Vitamin D (CALCIUM 600+D PO) Take 2 tablets by mouth daily      VITAMIN D PO Take 50,000 Units by mouth every 30 days      simvastatin (ZOCOR) 20 MG tablet Take 20 mg by mouth At Bedtime.     diltiazem (CARDIZEM CD; CARTIA XT) 360 MG 24 hr CD capsule Take 180 mg by mouth daily      No current facility-administered medications for this visit.      Allergies   Allergen Reactions     Sulfa Drugs      hives       ROS:  C: NEGATIVE for fever, chills, change in weight  I: NEGATIVE for worrisome rashes, moles or lesions  E: NEGATIVE for vision changes or irritation  ENT: NEGATIVE for ear, mouth and throat problems  R: NEGATIVE for significant cough or SOB  B: NEGATIVE for masses, tenderness or discharge  CV: NEGATIVE for chest pain, palpitations or peripheral edema  GI: NEGATIVE for nausea, abdominal pain, heartburn, or change in bowel habits  : Positive for vaginal odor.   M: NEGATIVE for significant arthralgias or myalgia  N: NEGATIVE for weakness, dizziness or paresthesias  P: NEGATIVE for changes in mood or affect    OBJECTIVE:     /64  Pulse 52  Ht 5' 7\" (1.702 m)  Wt 207 lb 11.2 oz (94.2 kg)  BMI 32.53 kg/m2  Body mass index is 32.53 kg/(m^2).    PHYSICAL EXAM:  Constitutional:  Appearance: Well nourished, well developed alert, in no acute distress  Chest:  Respiratory Effort:  Breathing unlabored  Gastrointestinal:  Abdominal Examination:  Abdomen nontender to palpation, tone normal without rigidity or guarding, no masses present, umbilicus without lesions; Liver/Spleen:  No hepatomegaly present, " liver nontender to palpation; Hernias:  No hernias present  Skin:General Inspection:  No rashes present, no lesions present, no areas of discoloration; Genitalia and Groin:  No rashes present, no lesions present, no areas of discoloration, no masses present.  Neurologic/Psychiatric:  Mental Status:  Oriented X3   Pelvic Exam:  External Genitalia:     Normal appearance for age, no discharge present, no tenderness present, no inflammatory lesions present, color normal  Vagina:     Normal vaginal vault without central or paravaginal defects, no discharge present, no inflammatory lesions present, no masses present  Bladder:     Nontender to palpation  Urethra:   Urethral Body:  Urethra palpation normal, urethra structural support normal   Urethral Meatus:  No erythema or lesions present  Cervix:     Appearance healthy, no lesions present, nontender to palpation, no bleeding present  Uterus:     Uterus: firm, normal sized and nontender, midplane in position.   Adnexa:     No adnexal tenderness present, no adnexal masses present  Perineum:     Perineum within normal limits, no evidence of trauma, no rashes or skin lesions present  Anus:     Anus within normal limits, no hemorrhoids present  Inguinal Lymph Nodes:     No lymphadenopathy present  Pubic Hair:     Normal pubic hair distribution for age  Genitalia and Groin:     No rashes present, no lesions present, no areas of discoloration, no masses present       In-Clinic Test Results:  No results found for this or any previous visit (from the past 24 hour(s)).    ASSESSMENT/PLAN:                                                        ICD-10-CM    1. Vaginal odor N89.8 Wet prep       PLAN:    Wet mount collected. Advised frequent changing of pads/underwear when in use. Will call patient with results and form a plan of care. Patient and  verbalize understanding and agree with plan.     Shandra LINDER

## 2018-02-09 ENCOUNTER — OFFICE VISIT (OUTPATIENT)
Dept: NEUROLOGY | Facility: CLINIC | Age: 83
End: 2018-02-09
Payer: MEDICARE

## 2018-02-09 VITALS
TEMPERATURE: 98.2 F | BODY MASS INDEX: 32.55 KG/M2 | HEIGHT: 67 IN | SYSTOLIC BLOOD PRESSURE: 179 MMHG | DIASTOLIC BLOOD PRESSURE: 78 MMHG | HEART RATE: 54 BPM | WEIGHT: 207.4 LBS

## 2018-02-09 DIAGNOSIS — F02.80 LATE ONSET ALZHEIMER'S DISEASE WITHOUT BEHAVIORAL DISTURBANCE (H): Primary | ICD-10-CM

## 2018-02-09 DIAGNOSIS — G30.1 LATE ONSET ALZHEIMER'S DISEASE WITHOUT BEHAVIORAL DISTURBANCE (H): Primary | ICD-10-CM

## 2018-02-09 RX ORDER — MEMANTINE HYDROCHLORIDE 5 MG/1
5 TABLET ORAL DAILY
Qty: 42 TABLET | Refills: 0 | Status: SHIPPED | OUTPATIENT
Start: 2018-02-09 | End: 2018-09-02

## 2018-02-09 RX ORDER — MEMANTINE HYDROCHLORIDE 10 MG/1
10 TABLET ORAL 2 TIMES DAILY
Qty: 90 TABLET | Refills: 3 | Status: SHIPPED | OUTPATIENT
Start: 2018-02-09 | End: 2018-09-04

## 2018-02-09 ASSESSMENT — PAIN SCALES - GENERAL: PAINLEVEL: NO PAIN (0)

## 2018-02-09 NOTE — MR AVS SNAPSHOT
After Visit Summary   2/9/2018    Chris Craig    MRN: [id number]           Patient Information     Date Of Birth          5/21/1934        Visit Information        Provider Department      2/9/2018 1:30 PM Natalio Gray MD [facility]        Today's Diagnoses     Late onset Alzheimer's disease without behavioral disturbance    -  1      Care Instructions    You saw saw Dr. Natalio Gray at the [facility] for an evaluation of memory loss, likely due to Alzheimer's disease, mild-to-moderate stage.  Your blood pressure was elevated today.    Recommendations:  1. Will start you on a new medication called memantine (Namenda) to help stabilize your memory loss. Will start this medication as follows:  Week 1, 5 mg every morning  Week 2, 5 mg every morning and 5 mg every evening  Week 3, 10 mg every morning, and 5 mg every evening  Week 4, and thereafter: 10 mg twice a day    2. Check BP once a week. Discuss possible changes in your blood pressure medications with Dr. Dale.  3. Encourage looking into day centers with group activities. Alzheimer's Association website is a good resource.   4. Return for follow up evaluation in 6 months    Research opportunities:  1. [facility] Caregiver Registry (forms available in our waiting room)  2. Alzheimer's Association TrialMatch:  http://www.alz.org/research/clinical_trials/find_clinical_trials_trialmatch.asp  3. ClinicalTrials.gov               Follow-ups after your visit        Your next 10 appointments already scheduled     Aug 24, 2018  9:15 AM CDT   RETURN EXTENDED with Natalio Gray MD   [facility] ([facility])    [address] [id number] [phone]              Who to contact     Please call your clinic at [phone] to:    Ask questions about your health    Make or cancel appointments    Discuss your medicines    Learn about your test results    Speak to  "your doctor            Additional Information About Your Visit        Etohumhart Information     [employer] gives you secure access to your electronic health record. If you see a primary care provider, you can also send messages to your care team and make appointments. If you have questions, please call your primary care clinic.  If you do not have a primary care provider, please call [phone] and they will assist you.      [employer] is an electronic gateway that provides easy, online access to your medical records. With [employer], you can request a clinic appointment, read your test results, renew a prescription or communicate with your care team.     To access your existing account, please contact your [facility] or call [phone] for assistance.        Care EveryWhere ID     This is your Care EveryWhere ID. This could be used by other organizations to access your [facility] medical records  [id number]        Your Vitals Were     Pulse Temperature Height BMI (Body Mass Index)          54 98.2  F (36.8  C) (Temporal) 5' 7\" (170.2 cm) 32.48 kg/m2         Blood Pressure from Last 3 Encounters:   02/09/18 179/78   12/06/17 134/64   06/06/17 146/64    Weight from Last 3 Encounters:   02/09/18 207 lb 6.4 oz (94.1 kg)   12/06/17 207 lb 11.2 oz (94.2 kg)   11/22/17 209 lb (94.8 kg)              Today, you had the following     No orders found for display         Today's Medication Changes          These changes are accurate as of 2/9/18  2:44 PM.  If you have any questions, ask your nurse or doctor.               Start taking these medicines.        Dose/Directions    * memantine 5 MG tablet   Commonly known as:  NAMENDA   Used for:  Late onset Alzheimer's disease without behavioral disturbance   Started by:  Natalio Gray MD        Dose:  5 mg   Take 1 tablet (5 mg) by mouth daily Increase dosage as instructed.   Quantity:  42 tablet   Refills:  0       * memantine 10 MG tablet "   Commonly known as:  NAMENDA   Used for:  Late onset Alzheimer's disease without behavioral disturbance   Started by:  Natalio Gray MD        Dose:  10 mg   Take 1 tablet (10 mg) by mouth 2 times daily Use as instructed   Quantity:  90 tablet   Refills:  3       * Notice:  This list has 2 medication(s) that are the same as other medications prescribed for you. Read the directions carefully, and ask your doctor or other care provider to review them with you.         Where to get your medicines      These medications were sent to [facility]/pharmacy #[id number] - [place], [place] - [address]  [address], [place] [address]     Phone:  [phone]     memantine 10 MG tablet    memantine 5 MG tablet                Primary Care Provider Office Phone # Fax #    Ti Dale [phone]       [facility] [address]  [place] [address]        Equal Access to Services     [facility] : Hadii neda ku hadasho Sorogelioali, waaxda luqadaha, qaybta kaalmada adeegyada, waxay ximenain hayearline farias . [facility] [phone].    ATENCIÓN: Si habla español, tiene a van disposición servicios gratuitos de asistencia lingüística. [facility] [phone].    We comply with applicable federal civil rights laws and [place] laws. We do not discriminate on the basis of race, color, national origin, age, disability, sex, sexual orientation, or gender identity.            Thank you!     Thank you for choosing [facility]UROS[facility]CIAL[facility]  for your care. Our goal is always to provide you with excellent care. Hearing back from our patients is one way we can continue to improve our services. Please take a few minutes to complete the written survey that you may receive in the mail after your visit with us. Thank you!             Your Updated Medication List - Protect others around you: Learn how to safely use, store and throw away your medicines at www.disposemymeds.org.          This  list is accurate as of 2/9/18  2:44 PM.  Always use your most recent med list.                   Brand Name Dispense Instructions for use Diagnosis    aspirin 325 MG tablet      Take 325 mg by mouth daily.        CALCIUM 600+D PO      Take 2 tablets by mouth daily        diltiazem 360 MG 24 hr CD capsule    CARDIZEM CD; CARTIA XT     Take 180 mg by mouth daily        donepezil 10 MG tablet    ARICEPT    30 tablet    TAKE 1 TABLET (10 MG) BY MOUTH AT BEDTIME    MCI (mild cognitive impairment)       LISINOPRIL PO      Take 20 mg by mouth daily        * memantine 5 MG tablet    NAMENDA    42 tablet    Take 1 tablet (5 mg) by mouth daily Increase dosage as instructed.    Late onset Alzheimer's disease without behavioral disturbance       * memantine 10 MG tablet    NAMENDA    90 tablet    Take 1 tablet (10 mg) by mouth 2 times daily Use as instructed    Late onset Alzheimer's disease without behavioral disturbance       SENIOR MULTIVITAMIN PLUS Tabs      Take 1 tablet by mouth daily        simvastatin 20 MG tablet    ZOCOR     Take 20 mg by mouth At Bedtime.        * VITAMIN D (CHOLECALCIFEROL) PO      Take 2,000 Units by mouth daily        * VITAMIN D PO      Take 50,000 Units by mouth every 30 days        * Notice:  This list has 4 medication(s) that are the same as other medications prescribed for you. Read the directions carefully, and ask your doctor or other care provider to review them with you.

## 2018-02-09 NOTE — LETTER
2/9/2018       RE: Chris Craig  [address]  [place] [address]     Dear Colleague,    Thank you for referring your patient, Chris Craig, to the [facility] at [facility]. Please see a copy of my visit note below.    I had the pleasure of seeing Ms. Craig in follow up consultation for her symptoms of memory loss. She is accompanied to today's visit by her  of 61 years, Shaun, who assists in providing the history.    Ms. Craig is an 83 year-old left-handed woman who has been followed in Memory Clinic for progressive memory loss. Evaluations by Dr. Fitch and the occupational therapist Dionne Diaz have been consistent with diagnosis of Alzheimer's disease. On an evaulation by OT, Nov 2016, her average CPT Score was 4.71/5.6 . She has been placed on donepezil and vitamin E supplements.    She was last seen by Dr. Fitch in June 2016. In the interim, she stopped taking vitamin E  Because the pills were too big. In the interim, her forgetfulness has become more noticeable. She repeats memories from the distant past, and repeats herself throughout the day. She has less interest in things she used to enjoys, such as jigsaw puzzles. She does not appear to be depressed.     In terms of daily function, she has no diffiuclty eating or showering. Shaun keeps track of her medications by using a weekly pill containers. Shaun also handles the finances. She is not very involved in group activities except going to a Bible study with Shaun once a week. She enjoys accompanying Shaun on errands. They have 3 daughters and a son who live nearby. Their daughters visit on Sat and help with chores and their son helps with outside chores.  Shaun does not let her cook, because does not trust her with this. She does not drive, Sahun has the keys.     She enjoys napping during the day. Her motor function remains good. Shaun notices a mild left hand tremor when she is  holding coffee. Her last BP check at home was high, around 170 systolic. She recently cut diltiazem in half.    Medical review of systems:  The comprehensive review of systems is otherwise reviewed and negative.     Patient Active Problem List   Diagnosis     Post-op pain     ACP (advance care planning)     MCI (mild cognitive impairment)       Past Medical History:   Diagnosis Date     Arthritis      Complication of anesthesia      H/O transfusion     but not entire;y sure     Hypertension      Mumps      She had normal childhood development and reports no major head injuries.     Past Surgical History:   Procedure Laterality Date     BLADDER SURGERY       BUNIONECTOMY RT/LT       C OPEN RX ANKLE DISLOCATN+FIXATN      right     COLONOSCOPY       COLONOSCOPY  3/13/2012    Procedure:COLONOSCOPY; COLONOSCOPY ; Surgeon:LUCAS MOODY; Location: GI     GYN SURGERY      hysterectomy     HC REMOVAL OF TONSILS,<13 Y/O       OPEN REDUCTION INTERNAL FIXATION ANKLE Right 3/30/2016    Procedure: OPEN REDUCTION INTERNAL FIXATION ANKLE;  Surgeon: Denis Gaffney MD;  Location:  OR       Current Outpatient Prescriptions   Medication Sig Dispense Refill     donepezil (ARICEPT) 10 MG tablet TAKE 1 TABLET (10 MG) BY MOUTH AT BEDTIME 30 tablet 0     LISINOPRIL PO Take 20 mg by mouth daily       VITAMIN D, CHOLECALCIFEROL, PO Take 2,000 Units by mouth daily       aspirin 325 MG tablet Take 325 mg by mouth daily.       Multiple Vitamins-Minerals (SENIOR MULTIVITAMIN PLUS) TABS Take 1 tablet by mouth daily        Calcium Carbonate-Vitamin D (CALCIUM 600+D PO) Take 2 tablets by mouth daily        VITAMIN D PO Take 50,000 Units by mouth every 30 days        simvastatin (ZOCOR) 20 MG tablet Take 20 mg by mouth At Bedtime.       diltiazem (CARDIZEM CD; CARTIA XT) 360 MG 24 hr CD capsule Take 180 mg by mouth daily           Allergies   Allergen Reactions     Sulfa Drugs      hives       Family History   Problem Relation  "Age of Onset     Other - See Comments Mother       at age 84 with cardiac disease      Myocardial Infarction Father       at age 70     There is no reported family history of neurodegenerative disease.     Social History     Social History     Marital status:      Spouse name: N/A     Number of children: N/A     Years of education: N/A     Occupational History     Not on file.     Social History Main Topics     Smoking status: Never Smoker     Smokeless tobacco: Never Used     Alcohol use 0.0 oz/week     0 Standard drinks or equivalent per week      Comment: one drink per week     Drug use: No     Sexual activity: No     Other Topics Concern     Not on file     Social History Narrative    Attended 1 year of college. Worked as  as and manager at a [employer]. Retired when moved to [place] [place], at age 53. 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 Met  Shaun at [facility]. Shaun is a retired  and is involved in community service.           General Physical examination:  /78 (BP Location: Left arm, Patient Position: Chair, Cuff Size: Adult Regular)  Pulse 54  Temp 98.2  F (36.8  C) (Temporal)  Ht 5' 7\" (170.2 cm)  Wt 207 lb 6.4 oz (94.1 kg)  BMI 32.48 kg/m2     General: Ms. Craig is well appearing and is appropriately groomed and dressed.  Chest: Clear to auscultation bilaterally.  Heart: Regular rhythm with no murmurs, rubs, or gallops.  Ext: warm, no edema  Skin: clean, dry, intact    Neurological examination:  Mental status: Ms. Craig  is awake, alert, and appropriate, with fluent speech, no word finding difficulties and no difficulty in answering questions. She forgot that she is no longer driving. She is unable to name their current street address, where they have lived for several years (provides previous address).She is well oriented to time, place and person. Her memory for remote events is intact. No apraxia is noted.  Cranial nerves: " Visual fields are full to confrontation with no visual extinction. Extraocular movements are intact. Smooth pursuit is intact. Saccades are normal in initiation, velocity and amplitude both vertically and horizontally. Facial sensation is intact to light touch. Facial strength is full bilaterally. She is hard of hearing but able to understand. The tongue protrudes in the midline with intact strength. There are no tongue fasciculations noted. The soft palate elevates symmetrically. Sternocledomastoid and trapezius muscle strength is full bilaterally.  Motor examination: Bulk is normal throughout. Axial and upper and lower extremity tone is normal. No pronator drift is noted. Strength is 5/5 and symmetric throughout. No fasciculations are noted. There is no tremor or other involuntary movement.  Sensory examination: Sensation is intact to vibratory sense and proprioception. There is no cortical sensory loss by graphesthesia, or neglect.  Reflexes: Reflexes are symmetric and 2+ at biceps, triceps, and brachioradialis. Patellar reflexes are 2+ bilaterally. Achilles reflexes are not obtainable bilaterally.   Coordination: Finger-nose-finger is normal with no dysmetria bilaterally.  Rapid opening and closing of fist is not slowed. Foot tapping is not slowed.  Gait: She has an upright and steady stance with normal stride length and arm swing. She sways to the left during Romberg testing.. There is no retropulsion.    Brief neuropsychological evaluation:  On global testing, using the Mini-Mental State Exam, Ms. Craig scores 17/30. She loses points on orientation (-9), recall (-3), and repetition (-1).  She is able to draw a clock and the hands to indicate 11:10.    Labs:  Office Visit on 12/06/2017   Component Date Value Ref Range Status     Specimen Description 12/06/2017 Vagina   Final     Wet Prep 12/06/2017 No yeast seen   Final     Wet Prep 12/06/2017 No Trichomonas seen   Final     Wet Prep 12/06/2017 No clue cells  seen   Final   Office Visit on 11/22/2017   Component Date Value Ref Range Status     Color Urine 11/22/2017 Yellow   Final     Appearance Urine 11/22/2017 Clear   Final     Glucose Urine 11/22/2017 Negative  NEG^Negative mg/dL Final     Bilirubin Urine 11/22/2017 Negative  NEG^Negative Final     Ketones Urine 11/22/2017 Negative  NEG^Negative mg/dL Final     Specific Gravity Urine 11/22/2017 1.015  1.003 - 1.035 Final     Blood Urine 11/22/2017 Negative  NEG^Negative Final     pH Urine 11/22/2017 7.5* 5.0 - 7.0 pH Final     Protein Albumin Urine 11/22/2017 Negative  NEG^Negative mg/dL Final     Urobilinogen Urine 11/22/2017 1.0  0.2 - 1.0 EU/dL Final     Nitrite Urine 11/22/2017 Negative  NEG^Negative Final     Leukocyte Esterase Urine 11/22/2017 Negative  NEG^Negative Final     Source 11/22/2017 Midstream Urine   Final   Office Visit on 10/18/2017   Component Date Value Ref Range Status     Color Urine 10/18/2017 Yellow   Final     Appearance Urine 10/18/2017 Clear   Final     Glucose Urine 10/18/2017 Negative  NEG^Negative mg/dL Final     Bilirubin Urine 10/18/2017 Negative  NEG^Negative Final     Ketones Urine 10/18/2017 Negative  NEG^Negative mg/dL Final     Specific Gravity Urine 10/18/2017 1.015  1.003 - 1.035 Final     Blood Urine 10/18/2017 Negative  NEG^Negative Final     pH Urine 10/18/2017 7.0  5.0 - 7.0 pH Final     Protein Albumin Urine 10/18/2017 Trace* NEG^Negative mg/dL Final     Urobilinogen Urine 10/18/2017 2.0* 0.2 - 1.0 EU/dL Final     Nitrite Urine 10/18/2017 Positive* NEG^Negative Final     Leukocyte Esterase Urine 10/18/2017 Small* NEG^Negative Final     Source 10/18/2017 Midstream Urine   Final     Specimen Description 10/18/2017 Midstream Urine   Final     Special Requests 10/18/2017 Specimen received in preservative   Final     Culture Micro 10/18/2017 *  Final                    Value:>100,000 colonies/mL  Escherichia coli       Culture Micro 10/18/2017    Final                     Value:<10,000 colonies/mL  mixed urogenital lina  Susceptibility testing not routinely done       Specimen Description 10/18/2017 Midstream Urine   Final     Special Requests 10/18/2017 Specimen received in [place] [employer]   Final     Culture Micro 10/18/2017 Ureaplasma species isolated*  Final     Specimen Description 10/18/2017 Midstream Urine   Final     Special Requests 10/18/2017 Specimen received in [place] [employer]   Final     Culture Micro 10/18/2017 No large colony Mycoplasma species isolated   Final   Admission on 03/30/2016, Discharged on 04/02/2016   Component Date Value Ref Range Status     Creatinine 03/30/2016 0.93  0.52 - 1.04 mg/dL Final     GFR Estimate 03/30/2016 57* >60 mL/min/1.7m2 Final     GFR Estimate If Black 03/30/2016 70  >60 mL/min/1.7m2 Final     Potassium 03/30/2016 4.3  3.4 - 5.3 mmol/L Final     Sodium 04/01/2016 137  133 - 144 mmol/L Final     Potassium 04/01/2016 4.1  3.4 - 5.3 mmol/L Final     Chloride 04/01/2016 104  94 - 109 mmol/L Final     Carbon Dioxide 04/01/2016 29  20 - 32 mmol/L Final     Anion Gap 04/01/2016 4  3 - 14 mmol/L Final     Glucose 04/01/2016 122* 70 - 99 mg/dL Final     Urea Nitrogen 04/01/2016 19  7 - 30 mg/dL Final     Creatinine 04/01/2016 0.80  0.52 - 1.04 mg/dL Final     GFR Estimate 04/01/2016 69  >60 mL/min/1.7m2 Final     GFR Estimate If Black 04/01/2016 83  >60 mL/min/1.7m2 Final     Calcium 04/01/2016 8.6  8.5 - 10.1 mg/dL Final     WBC 04/01/2016 8.8  4.0 - 11.0 10e9/L Final     RBC Count 04/01/2016 3.85  3.8 - 5.2 10e12/L Final     Hemoglobin 04/01/2016 11.7  11.7 - 15.7 g/dL Final     Hematocrit 04/01/2016 35.1  35.0 - 47.0 % Final     MCV 04/01/2016 91  78 - 100 fl Final     MCH 04/01/2016 30.4  26.5 - 33.0 pg Final     MCHC 04/01/2016 33.3  31.5 - 36.5 g/dL Final     RDW 04/01/2016 13.5  10.0 - 15.0 % Final     Platelet Count 04/01/2016 230  150 - 450 10e9/L Final     Diff Method 04/01/2016 Automated Method   Final     %  Neutrophils 04/01/2016 76.6  % Final     % Lymphocytes 04/01/2016 12.6  % Final     % Monocytes 04/01/2016 7.5  % Final     % Eosinophils 04/01/2016 2.6  % Final     % Basophils 04/01/2016 0.1  % Final     % Immature Granulocytes 04/01/2016 0.6  % Final     Nucleated RBCs 04/01/2016 0  0 /100 Final     Absolute Neutrophil 04/01/2016 6.8  1.6 - 8.3 10e9/L Final     Absolute Lymphocytes 04/01/2016 1.1  0.8 - 5.3 10e9/L Final     Absolute Monocytes 04/01/2016 0.7  0.0 - 1.3 10e9/L Final     Absolute Eosinophils 04/01/2016 0.2  0.0 - 0.7 10e9/L Final     Absolute Basophils 04/01/2016 0.0  0.0 - 0.2 10e9/L Final     Abs Immature Granulocytes 04/01/2016 0.1  0 - 0.4 10e9/L Final     Absolute Nucleated RBC 04/01/2016 0.0   Final     Interpretation ECG 04/01/2016 Click View Image link to view waveform and result   Final     Troponin I ES 04/01/2016   0.000 - 0.045 ug/L Final                    Value:<0.015  The 99th percentile for upper reference range is 0.045 ug/L.  Troponin values in   the range of 0.045 - 0.120 ug/L may be associated with risks of adverse   clinical events.       D Dimer 04/01/2016 4.7* 0.0 - 0.50 ug/ml FEU Final     Interpretation ECG 04/01/2016 Click View Image link to view waveform and result   Final     In 2016, Dr. Eris James documented normal TSH and B12 levels    Imaging:  MRI brain 5/2016 shows mild generalized cortical atrophy and mild subcortical and periventricular T2/FLAIR hyperintensities.     Impression:  Ms. Craig is a 83 year old left-handed female who has been followed in Memory Clinic for progressive memory loss. In Nov 2016, her average CPT Score was 4.71/5.6 . Today her MMSE is 17/30. Based on her level of function, she is now in the moderate stages of Alzheimer's disease.     Recommendations:    Will start memantine which is indicated for moderate to severe AD, when used in conjunction with acetylcholinesterase inhibitors. Dosing will increase as follows:   Week 1, 5 mg every  morning   Week 2, 5 mg every morning and 5 mg every evening   Week 3, 10 mg every morning, and 5 mg every evening   Week 4, and thereafter: 10 mg twice a day    BP was elevated today and she recently reduced her diltiazem dosage. Instructed them to check BP once a week, and discuss possible changes in her blood pressure medications with her PCP Dr. Dale.    Encourage looking into day centers with group activities. Alzheimer's Association website is a good resource.     Return for follow up evaluation in 6 months    I spent a total of 60 minutes face-to-face with the patient, and over half of that time was spent counseling regarding the symptoms, diagnosis, medication risks, lifestyle modification, therapeutic options, and prognosis.      Sincerely,    Natalio Gray MD

## 2018-02-09 NOTE — PATIENT INSTRUCTIONS
You saw saw Dr. Natalio Gray at the [facility] for an evaluation of memory loss, likely due to Alzheimer's disease, mild-to-moderate stage.  Your blood pressure was elevated today.    Recommendations:  1. Will start you on a new medication called memantine (Namenda) to help stabilize your memory loss. Will start this medication as follows:  Week 1, 5 mg every morning  Week 2, 5 mg every morning and 5 mg every evening  Week 3, 10 mg every morning, and 5 mg every evening  Week 4, and thereafter: 10 mg twice a day    2. Check BP once a week. Discuss possible changes in your blood pressure medications with Dr. Dale.  3. Encourage looking into day centers with group activities. Alzheimer's Association website is a good resource.   4. Return for follow up evaluation in 6 months    Research opportunities:  1. [facility] Caregiver Registry (forms available in our waiting room)  2. Alzheimer's Association TrialMatch:  http://www.alz.org/research/clinical_trials/find_clinical_trials_trialmatch.asp  3. ClinicalTrials.gov

## 2018-02-09 NOTE — PROGRESS NOTES
I had the pleasure of seeing Ms. Craig in follow up consultation for her symptoms of memory loss. She is accompanied to today's visit by her  of 61 years, Shaun, who assists in providing the history.    Ms. Craig is an 83 year-old left-handed woman who [facility] memory loss. Evaluations by Dr. Fitch and the occupational therapist Dionne Diaz have been consistent with diagnosis of Alzheimer's disease. On an evaulation by OT, Nov 2016, her average CPT Score was 4.71/5.6. She has been placed on donepezil and vitamin E supplements.    She was last seen by Dr. Fitch in June 2016. In the interim, she stopped taking vitamin E  Because the pills were too big. In the interim, her forgetfulness has become more noticeable. She repeats memories from the distant past, and repeats herself throughout the day. She has less interest in things she used to enjoys, such as jigsaw puzzles. She does not appear to be depressed.     In terms of daily function, she has no diffiuclty eating or showering. Shaun keeps track of her medications by using a weekly pill containers. Shaun also handles the finances. She is not very involved in group activities except going to a Bible study with Shaun once a week. She enjoys accompanying Shaun on errands. They have 3 daughters and a son who live nearby. Their daughters visit on Sat and help with chores and their son helps with outside chores.  Shaun does not let her cook, because does not trust her with this. She does not drive, Shaun has the keys.     She enjoys napping during the day. Her motor function remains good. Shaun notices a mild left hand tremor when she is holding coffee. Her last BP check at home was high, around 170 systolic. She recently cut diltiazem in half.    Medical review of systems:  The comprehensive review of systems is otherwise reviewed and negative.     Patient Active Problem List   Diagnosis     Post-op pain     ACP (advance  care planning)     MCI (mild cognitive impairment)       Past Medical History:   Diagnosis Date     Arthritis      Complication of anesthesia      H/O transfusion     but not entire;y sure     Hypertension      Mumps      She had normal childhood development and reports no major head injuries.     Past Surgical History:   Procedure Laterality Date     BLADDER SURGERY       BUNIONECTOMY RT/LT       C OPEN RX ANKLE DISLOCATN+FIXATN      right     COLONOSCOPY       COLONOSCOPY  3/13/2012    Procedure:COLONOSCOPY; COLONOSCOPY ; Surgeon:LUCAS MOODY; Location: GI     GYN SURGERY      hysterectomy     HC REMOVAL OF TONSILS,<11 Y/O       OPEN REDUCTION INTERNAL FIXATION ANKLE Right 3/30/2016    Procedure: OPEN REDUCTION INTERNAL FIXATION ANKLE;  Surgeon: Denis Gaffney MD;  Location:  OR       Current Outpatient Prescriptions   Medication Sig Dispense Refill     donepezil (ARICEPT) 10 MG tablet TAKE 1 TABLET (10 MG) BY MOUTH AT BEDTIME 30 tablet 0     LISINOPRIL PO Take 20 mg by mouth daily       VITAMIN D, CHOLECALCIFEROL, PO Take 2,000 Units by mouth daily       aspirin 325 MG tablet Take 325 mg by mouth daily.       Multiple Vitamins-Minerals (SENIOR MULTIVITAMIN PLUS) TABS Take 1 tablet by mouth daily        Calcium Carbonate-Vitamin D (CALCIUM 600+D PO) Take 2 tablets by mouth daily        VITAMIN D PO Take 50,000 Units by mouth every 30 days        simvastatin (ZOCOR) 20 MG tablet Take 20 mg by mouth At Bedtime.       diltiazem (CARDIZEM CD; CARTIA XT) 360 MG 24 hr CD capsule Take 180 mg by mouth daily           Allergies   Allergen Reactions     Sulfa Drugs      hives       Family History   Problem Relation Age of Onset     Other - See Comments Mother       at age 84 with cardiac disease      Myocardial Infarction Father       at age 70     There is no reported family history of neurodegenerative disease.     Social History     Social History     Marital status:      Spouse  "name: N/A     Number of children: N/A     Years of education: N/A     Occupational History     Not on file.     Social History Main Topics     Smoking status: Never Smoker     Smokeless tobacco: Never Used     Alcohol use 0.0 oz/week     0 Standard drinks or equivalent per week      Comment: one drink per week     Drug use: No     Sexual activity: No     Other Topics Concern     Not on file     Social History Narrative    Attended 1 year of college. Worked as  as and manager at a [employer]. Retired when moved to [facility], at age 53. 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 Met  Shaun at [facility]. Shaun is a retired  and is involved in community service.           General Physical examination:  /78 (BP Location: Left arm, Patient Position: Chair, Cuff Size: Adult Regular)  Pulse 54  Temp 98.2  F (36.8  C) (Temporal)  Ht 5' 7\" (170.2 cm)  Wt 207 lb 6.4 oz (94.1 kg)  BMI 32.48 kg/m2     General: Ms. Craig is well appearing and is appropriately groomed and dressed.  Chest: Clear to auscultation bilaterally.  Heart: Regular rhythm with no murmurs, rubs, or gallops.  Ext: warm, no edema  Skin: clean, dry, intact    Neurological examination:  Mental status: Ms. Craig  is awake, alert, and appropriate, with fluent speech, no word finding difficulties and no difficulty in answering questions. She forgot that she is no longer driving. She is unable to name their current street address, where they have lived for several years (provides previous address).She is well oriented to time, place and person. Her memory for remote events is intact. No apraxia is noted.  Cranial nerves: Visual fields are full to confrontation with no visual extinction. Extraocular movements are intact. Smooth pursuit is intact. Saccades are normal in initiation, velocity and amplitude both vertically and horizontally. Facial sensation is intact to light touch. Facial strength is full " bilaterally. She is hard of hearing but able to understand. The tongue protrudes in the midline with intact strength. There are no tongue fasciculations noted. The soft palate elevates symmetrically. Sternocledomastoid and trapezius muscle strength is full bilaterally.  Motor examination: Bulk is normal throughout. Axial and upper and lower extremity tone is normal. No pronator drift is noted. Strength is 5/5 and symmetric throughout. No fasciculations are noted. There is no tremor or other involuntary movement.  Sensory examination: Sensation is intact to vibratory sense and proprioception. There is no cortical sensory loss by graphesthesia, or neglect.  Reflexes: Reflexes are symmetric and 2+ at biceps, triceps, and brachioradialis. Patellar reflexes are 2+ bilaterally. Achilles reflexes are not obtainable bilaterally.   Coordination: Finger-nose-finger is normal with no dysmetria bilaterally.  Rapid opening and closing of fist is not slowed. Foot tapping is not slowed.  Gait: She has an upright and steady stance with normal stride length and arm swing. She sways to the left during Romberg testing.. There is no retropulsion.    Brief neuropsychological evaluation:  On global testing, using the Mini-Mental State Exam, Ms. Craig scores 17/30. She loses points on orientation (-9), recall (-3), and repetition (-1).  She is able to draw a clock and the hands to indicate 11:10.    Labs:  Office Visit on 12/06/2017   Component Date Value Ref Range Status     Specimen Description 12/06/2017 Vagina   Final     Wet Prep 12/06/2017 No yeast seen   Final     Wet Prep 12/06/2017 No Trichomonas seen   Final     Wet Prep 12/06/2017 No clue cells seen   Final   Office Visit on 11/22/2017   Component Date Value Ref Range Status     Color Urine 11/22/2017 Yellow   Final     Appearance Urine 11/22/2017 Clear   Final     Glucose Urine 11/22/2017 Negative  NEG^Negative mg/dL Final     Bilirubin Urine 11/22/2017 Negative   NEG^Negative Final     Ketones Urine 11/22/2017 Negative  NEG^Negative mg/dL Final     Specific Gravity Urine 11/22/2017 1.015  1.003 - 1.035 Final     Blood Urine 11/22/2017 Negative  NEG^Negative Final     pH Urine 11/22/2017 7.5* 5.0 - 7.0 pH Final     Protein Albumin Urine 11/22/2017 Negative  NEG^Negative mg/dL Final     Urobilinogen Urine 11/22/2017 1.0  0.2 - 1.0 EU/dL Final     Nitrite Urine 11/22/2017 Negative  NEG^Negative Final     Leukocyte Esterase Urine 11/22/2017 Negative  NEG^Negative Final     Source 11/22/2017 Midstream Urine   Final   Office Visit on 10/18/2017   Component Date Value Ref Range Status     Color Urine 10/18/2017 Yellow   Final     Appearance Urine 10/18/2017 Clear   Final     Glucose Urine 10/18/2017 Negative  NEG^Negative mg/dL Final     Bilirubin Urine 10/18/2017 Negative  NEG^Negative Final     Ketones Urine 10/18/2017 Negative  NEG^Negative mg/dL Final     Specific Gravity Urine 10/18/2017 1.015  1.003 - 1.035 Final     Blood Urine 10/18/2017 Negative  NEG^Negative Final     pH Urine 10/18/2017 7.0  5.0 - 7.0 pH Final     Protein Albumin Urine 10/18/2017 Trace* NEG^Negative mg/dL Final     Urobilinogen Urine 10/18/2017 2.0* 0.2 - 1.0 EU/dL Final     Nitrite Urine 10/18/2017 Positive* NEG^Negative Final     Leukocyte Esterase Urine 10/18/2017 Small* NEG^Negative Final     Source 10/18/2017 Midstream Urine   Final     Specimen Description 10/18/2017 Midstream Urine   Final     Special Requests 10/18/2017 Specimen received in preservative   Final     Culture Micro 10/18/2017 *  Final                    Value:>100,000 colonies/mL  Escherichia coli       Culture Micro 10/18/2017    Final                    Value:<10,000 colonies/mL  mixed urogenital lina  Susceptibility testing not routinely done       Specimen Description 10/18/2017 Midstream Urine   Final     Special Requests 10/18/2017 Specimen received in [employer]   Final     Culture Micro 10/18/2017 Ureaplasma  species isolated*  Final     Specimen Description 10/18/2017 Midstream Urine   Final     Special Requests 10/18/2017 Specimen received in [employer]   Final     Culture Micro 10/18/2017 No large colony Mycoplasma species isolated   Final   Admission on 03/30/2016, Discharged on 04/02/2016   Component Date Value Ref Range Status     Creatinine 03/30/2016 0.93  0.52 - 1.04 mg/dL Final     GFR Estimate 03/30/2016 57* >60 mL/min/1.7m2 Final     GFR Estimate If Black 03/30/2016 70  >60 mL/min/1.7m2 Final     Potassium 03/30/2016 4.3  3.4 - 5.3 mmol/L Final     Sodium 04/01/2016 137  133 - 144 mmol/L Final     Potassium 04/01/2016 4.1  3.4 - 5.3 mmol/L Final     Chloride 04/01/2016 104  94 - 109 mmol/L Final     Carbon Dioxide 04/01/2016 29  20 - 32 mmol/L Final     Anion Gap 04/01/2016 4  3 - 14 mmol/L Final     Glucose 04/01/2016 122* 70 - 99 mg/dL Final     Urea Nitrogen 04/01/2016 19  7 - 30 mg/dL Final     Creatinine 04/01/2016 0.80  0.52 - 1.04 mg/dL Final     GFR Estimate 04/01/2016 69  >60 mL/min/1.7m2 Final     GFR Estimate If Black 04/01/2016 83  >60 mL/min/1.7m2 Final     Calcium 04/01/2016 8.6  8.5 - 10.1 mg/dL Final     WBC 04/01/2016 8.8  4.0 - 11.0 10e9/L Final     RBC Count 04/01/2016 3.85  3.8 - 5.2 10e12/L Final     Hemoglobin 04/01/2016 11.7  11.7 - 15.7 g/dL Final     Hematocrit 04/01/2016 35.1  35.0 - 47.0 % Final     MCV 04/01/2016 91  78 - 100 fl Final     MCH 04/01/2016 30.4  26.5 - 33.0 pg Final     MCHC 04/01/2016 33.3  31.5 - 36.5 g/dL Final     RDW 04/01/2016 13.5  10.0 - 15.0 % Final     Platelet Count 04/01/2016 230  150 - 450 10e9/L Final     Diff Method 04/01/2016 Automated Method   Final     % Neutrophils 04/01/2016 76.6  % Final     % Lymphocytes 04/01/2016 12.6  % Final     % Monocytes 04/01/2016 7.5  % Final     % Eosinophils 04/01/2016 2.6  % Final     % Basophils 04/01/2016 0.1  % Final     % Immature Granulocytes 04/01/2016 0.6  % Final     Nucleated RBCs 04/01/2016 0   0 /100 Final     Absolute Neutrophil 04/01/2016 6.8  1.6 - 8.3 10e9/L Final     Absolute Lymphocytes 04/01/2016 1.1  0.8 - 5.3 10e9/L Final     Absolute Monocytes 04/01/2016 0.7  0.0 - 1.3 10e9/L Final     Absolute Eosinophils 04/01/2016 0.2  0.0 - 0.7 10e9/L Final     Absolute Basophils 04/01/2016 0.0  0.0 - 0.2 10e9/L Final     Abs Immature Granulocytes 04/01/2016 0.1  0 - 0.4 10e9/L Final     Absolute Nucleated RBC 04/01/2016 0.0   Final     Interpretation ECG 04/01/2016 Click View Image link to view waveform and result   Final     Troponin I ES 04/01/2016   0.000 - 0.045 ug/L Final                    Value:<0.015  The 99th percentile for upper reference range is 0.045 ug/L.  Troponin values in   the range of 0.045 - 0.120 ug/L may be associated with risks of adverse   clinical events.       D Dimer 04/01/2016 4.7* 0.0 - 0.50 ug/ml FEU Final     Interpretation ECG 04/01/2016 Click View Image link to view waveform and result   Final     In 2016, Dr. Eris James documented normal TSH and B12 levels    Imaging:  MRI brain 5/2016 shows mild generalized cortical atrophy and mild subcortical and periventricular T2/FLAIR hyperintensities.     Impression:  Ms. Craig is a 83 year old left-handed female who [facility] in [facility] for [facility] memory loss. In Nov 2016, her average CPT Score was 4.71/5.6. Today her MMSE is 17/30. Based on her level of function, she is now in the moderate stages of Alzheimer's disease.     Recommendations:    Will start memantine which is indicated for moderate to severe AD, when used in conjunction with acetylcholinesterase inhibitors. Dosing will increase as follows:   Week 1, 5 mg every morning   Week 2, 5 mg every morning and 5 mg every evening   Week 3, 10 mg every morning, and 5 mg every evening   Week 4, and thereafter: 10 mg twice a day    BP was elevated today and she recently reduced her diltiazem dosage. Instructed them to check BP once a week, and discuss  possible changes in her blood pressure medications with her PCP Dr. Dale.    Encourage looking into day centers with group activities. Alzheimer's Association website is a good resource.     Return for follow up evaluation in 6 months    I spent a total of 60 minutes face-to-face with the patient, and over half of that time was spent counseling regarding the symptoms, diagnosis, medication risks, lifestyle modification, therapeutic options, and prognosis.

## 2018-08-24 ENCOUNTER — MEDICAL CORRESPONDENCE (OUTPATIENT)
Dept: HEALTH INFORMATION MANAGEMENT | Facility: CLINIC | Age: 83
End: 2018-08-24

## 2018-08-24 ENCOUNTER — OFFICE VISIT (OUTPATIENT)
Dept: NEUROLOGY | Facility: CLINIC | Age: 83
End: 2018-08-24
Payer: MEDICARE

## 2018-08-24 VITALS
DIASTOLIC BLOOD PRESSURE: 79 MMHG | WEIGHT: 209 LBS | SYSTOLIC BLOOD PRESSURE: 141 MMHG | BODY MASS INDEX: 32.73 KG/M2 | HEART RATE: 56 BPM | RESPIRATION RATE: 16 BRPM | TEMPERATURE: 97 F

## 2018-08-24 DIAGNOSIS — F02.80 LATE ONSET ALZHEIMER'S DISEASE WITHOUT BEHAVIORAL DISTURBANCE (H): Primary | ICD-10-CM

## 2018-08-24 DIAGNOSIS — G30.1 LATE ONSET ALZHEIMER'S DISEASE WITHOUT BEHAVIORAL DISTURBANCE (H): Primary | ICD-10-CM

## 2018-08-24 ASSESSMENT — PAIN SCALES - GENERAL: PAINLEVEL: NO PAIN (0)

## 2018-08-24 NOTE — LETTER
"8/24/2018     RE: Chris Craig  [address]  [facility] [address]     Dear Colleague,    Thank you for referring your patient, Chris Craig, to the [facility] at [facility]. Please see a copy of my visit note below.    Chief Complaint: Follow up    History of Present Illness:  I had the pleasure of seeing Ms. Craig in follow up consultation for her symptoms of memory loss. She is accompanied to today's visit by her , Shaun, and daughter Venus, who assist in providing the history. Venus lives nearby and checks on her frequently.    Ms. Craig is an 84 year-old left-handed woman who has been followed in [facility] for progressive memory loss. Evaluations by Dr. Fitch and the occupational therapist Dionne Diaz have been consistent with diagnosis of Alzheimer's disease. She has been placed on donepezil.    She was last seen in Feb 2018. In the interim, her memory is slowly degrading. She is slow to know where they are going. She was able to start taking memantine and escalate the dose to 10 mg bid, without side effects or noticeable benefit. She cannot remember their current address, where they lived the past 18 years. She dreams more than she did in the past, and once woke up convinced she was in [facility].    In terms of day-to-day function, she reports that she is doing okay. \"Shaun takes good care of me.\" Shaun manages all her needs including medications suing a weekly pill dispenser, and he does the cooking. He prefers to do everything himself without getting outside help. She wears an ID bracelet, but they are not concerned about her wandering. She is no longer driving and has an ID card to replace her 's license.     They go to balance class twice a week. She attends Bible study and reads the newspaper daily. Her family keeps her busy. She is also a \"great yulissa\" She enjoys watching birds in the bird feeder. Her weight is stable, " and she eats lots of fruits and vegetables.  They rarely check her BP at home. Venus has a BP cuff. She is back up to her previous dosage of diltiazem after her BP manoj when this tablet was cut in half.    Medical review of systems:  The comprehensive review of systems is otherwise reviewed and negative.     Patient Active Problem List   Diagnosis     Post-op pain     ACP (advance care planning)     MCI (mild cognitive impairment)       Past Medical History:   Diagnosis Date     Arthritis      Complication of anesthesia      H/O transfusion     but not entire;y sure     Hypertension      Mumps      She had normal childhood development and reports no major head injuries.     Past Surgical History:   Procedure Laterality Date     BLADDER SURGERY       BUNIONECTOMY RT/LT       C OPEN RX ANKLE DISLOCATN+FIXATN      right     COLONOSCOPY       COLONOSCOPY  3/13/2012    Procedure:COLONOSCOPY; COLONOSCOPY ; Surgeon:LUCAS MOODY; Location: GI     GYN SURGERY      hysterectomy     HC REMOVAL OF TONSILS,<13 Y/O       OPEN REDUCTION INTERNAL FIXATION ANKLE Right 3/30/2016    Procedure: OPEN REDUCTION INTERNAL FIXATION ANKLE;  Surgeon: Denis Gaffney MD;  Location:  OR       Current Outpatient Prescriptions   Medication Sig Dispense Refill     aspirin 325 MG tablet Take 325 mg by mouth daily.       Calcium Carbonate-Vitamin D (CALCIUM 600+D PO) Take 2 tablets by mouth daily        donepezil (ARICEPT) 10 MG tablet TAKE 1 TABLET (10 MG) BY MOUTH AT BEDTIME 30 tablet 0     LISINOPRIL PO Take 20 mg by mouth daily       memantine (NAMENDA) 10 MG tablet Take 1 tablet (10 mg) by mouth 2 times daily Use as instructed 90 tablet 3     Multiple Vitamins-Minerals (SENIOR MULTIVITAMIN PLUS) TABS Take 1 tablet by mouth daily        simvastatin (ZOCOR) 20 MG tablet Take 20 mg by mouth At Bedtime.       VITAMIN D PO Take 50,000 Units by mouth every 30 days        VITAMIN D, CHOLECALCIFEROL, PO Take 2,000 Units by mouth  daily       diltiazem (CARDIZEM CD; CARTIA XT) 360 MG 24 hr CD capsule Take 180 mg by mouth daily        memantine (NAMENDA) 5 MG tablet Take 1 tablet (5 mg) by mouth daily Increase dosage as instructed. (Patient not taking: Reported on 2018) 42 tablet 0        Allergies   Allergen Reactions     Sulfa Drugs      hives       Family History   Problem Relation Age of Onset     Other - See Comments Mother       at age 84 with cardiac disease      Myocardial Infarction Father       at age 70   Her mother had stroke at age 80  Her father had MI at age 70,  age 70  Brother Antonio passed away, age 62  There is no reported family history of neurodegenerative disease.     Social History     Social History     Marital status:      Spouse name: N/A     Number of children: N/A     Years of education: N/A     Occupational History     Not on file.     Social History Main Topics     Smoking status: Never Smoker     Smokeless tobacco: Never Used     Alcohol use 0.0 oz/week     0 Standard drinks or equivalent per week      Comment: one drink per week     Drug use: No     Sexual activity: No     Other Topics Concern     Not on file     Social History Narrative    Attended 1 year of college. Worked as  as and manager at a [employer]. Retired when moved to [facility], at age 53. 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 Met  Shaun at [facility]. Shaun is a retired  and is involved in community service.           General Physical examination:  /79 (BP Location: Right arm, Patient Position: Chair, Cuff Size: Adult Regular)  Pulse 56  Temp 97  F (36.1  C)  Resp 16  Wt 209 lb (94.8 kg)  BMI 32.73 kg/m2     General: Ms. Craig is well appearing and is appropriately groomed and dressed.  Chest: Clear to auscultation bilaterally.  Heart: Regular rhythm with no murmurs, rubs, or gallops.  Ext: warm, no edema  Skin: clean, dry, intact    Neurological  examination:  Mental status: Ms. Craig  is awake, alert, and appropriate, with fluent speech, no word finding difficulties and no difficulty in answering questions. Unable to say current address. No apraxia is noted.  Cranial nerves: Visual fields are full to confrontation with no visual extinction. Extraocular movements are intact. Smooth pursuit is intact. Saccades are normal in initiation, velocity and amplitude both vertically and horizontally. Facial sensation is intact to light touch. Facial strength is full bilaterally. She is hard of hearing but able to understand. The tongue protrudes in the midline with intact strength. There are no tongue fasciculations noted. The soft palate elevates symmetrically. Sternocledomastoid and trapezius muscle strength is full bilaterally.  Motor examination: Bulk is normal throughout. Axial and upper and lower extremity tone is normal. No pronator drift is noted. Strength is 5/5 and symmetric throughout. No fasciculations are noted. There is no tremor or other involuntary movement.  Sensory examination: Sensation is intact to vibratory sense and proprioception. There is no cortical sensory loss by graphesthesia, or neglect.  Reflexes: Reflexes are symmetric and 2+ at biceps, triceps, and brachioradialis. Patellar reflexes are 2+ bilaterally. Achilles reflexes are not obtainable bilaterally.   Coordination: Finger-nose-finger is normal with no dysmetria bilaterally.  Rapid opening and closing of fist is not slowed. Foot tapping is not slowed.  Gait: She has an upright and steady stance with normal stride length and arm swing. There is no retropulsion.    Brief neuropsychological evaluation:  On global testing, using the MoCA, Ms. Craig scores 11/30. She gets 3/5 on drawing clock, 2/3 on naming animals, 0/2 on repetition, 0/2 on abstraction, 0/5 on recall, and 2/6 on orientation.    Labs:  Office Visit on 12/06/2017   Component Date Value Ref Range Status     Specimen  Description 12/06/2017 Vagina   Final     Wet Prep 12/06/2017 No yeast seen   Final     Wet Prep 12/06/2017 No Trichomonas seen   Final     Wet Prep 12/06/2017 No clue cells seen   Final   Office Visit on 11/22/2017   Component Date Value Ref Range Status     Color Urine 11/22/2017 Yellow   Final     Appearance Urine 11/22/2017 Clear   Final     Glucose Urine 11/22/2017 Negative  NEG^Negative mg/dL Final     Bilirubin Urine 11/22/2017 Negative  NEG^Negative Final     Ketones Urine 11/22/2017 Negative  NEG^Negative mg/dL Final     Specific Gravity Urine 11/22/2017 1.015  1.003 - 1.035 Final     Blood Urine 11/22/2017 Negative  NEG^Negative Final     pH Urine 11/22/2017 7.5* 5.0 - 7.0 pH Final     Protein Albumin Urine 11/22/2017 Negative  NEG^Negative mg/dL Final     Urobilinogen Urine 11/22/2017 1.0  0.2 - 1.0 EU/dL Final     Nitrite Urine 11/22/2017 Negative  NEG^Negative Final     Leukocyte Esterase Urine 11/22/2017 Negative  NEG^Negative Final     Source 11/22/2017 Midstream Urine   Final   Office Visit on 10/18/2017   Component Date Value Ref Range Status     Color Urine 10/18/2017 Yellow   Final     Appearance Urine 10/18/2017 Clear   Final     Glucose Urine 10/18/2017 Negative  NEG^Negative mg/dL Final     Bilirubin Urine 10/18/2017 Negative  NEG^Negative Final     Ketones Urine 10/18/2017 Negative  NEG^Negative mg/dL Final     Specific Gravity Urine 10/18/2017 1.015  1.003 - 1.035 Final     Blood Urine 10/18/2017 Negative  NEG^Negative Final     pH Urine 10/18/2017 7.0  5.0 - 7.0 pH Final     Protein Albumin Urine 10/18/2017 Trace* NEG^Negative mg/dL Final     Urobilinogen Urine 10/18/2017 2.0* 0.2 - 1.0 EU/dL Final     Nitrite Urine 10/18/2017 Positive* NEG^Negative Final     Leukocyte Esterase Urine 10/18/2017 Small* NEG^Negative Final     Source 10/18/2017 Midstream Urine   Final     Specimen Description 10/18/2017 Midstream Urine   Final     Special Requests 10/18/2017 Specimen received in preservative    Final     Culture Micro 10/18/2017 *  Final                    Value:>100,000 colonies/mL  Escherichia coli       Culture Micro 10/18/2017    Final                    Value:<10,000 colonies/mL  mixed urogenital lina  Susceptibility testing not routinely done       Specimen Description 10/18/2017 Midstream Urine   Final     Special Requests 10/18/2017 Specimen received in [employer]   Final     Culture Micro 10/18/2017 Ureaplasma species isolated*  Final     Specimen Description 10/18/2017 Midstream Urine   Final     Special Requests 10/18/2017 Specimen received in [employer]   Final     Culture Micro 10/18/2017 No large colony Mycoplasma species isolated   Final   Admission on 03/30/2016, Discharged on 04/02/2016   Component Date Value Ref Range Status     Creatinine 03/30/2016 0.93  0.52 - 1.04 mg/dL Final     GFR Estimate 03/30/2016 57* >60 mL/min/1.7m2 Final     GFR Estimate If Black 03/30/2016 70  >60 mL/min/1.7m2 Final     Potassium 03/30/2016 4.3  3.4 - 5.3 mmol/L Final     Sodium 04/01/2016 137  133 - 144 mmol/L Final     Potassium 04/01/2016 4.1  3.4 - 5.3 mmol/L Final     Chloride 04/01/2016 104  94 - 109 mmol/L Final     Carbon Dioxide 04/01/2016 29  20 - 32 mmol/L Final     Anion Gap 04/01/2016 4  3 - 14 mmol/L Final     Glucose 04/01/2016 122* 70 - 99 mg/dL Final     Urea Nitrogen 04/01/2016 19  7 - 30 mg/dL Final     Creatinine 04/01/2016 0.80  0.52 - 1.04 mg/dL Final     GFR Estimate 04/01/2016 69  >60 mL/min/1.7m2 Final     GFR Estimate If Black 04/01/2016 83  >60 mL/min/1.7m2 Final     Calcium 04/01/2016 8.6  8.5 - 10.1 mg/dL Final     WBC 04/01/2016 8.8  4.0 - 11.0 10e9/L Final     RBC Count 04/01/2016 3.85  3.8 - 5.2 10e12/L Final     Hemoglobin 04/01/2016 11.7  11.7 - 15.7 g/dL Final     Hematocrit 04/01/2016 35.1  35.0 - 47.0 % Final     MCV 04/01/2016 91  78 - 100 fl Final     MCH 04/01/2016 30.4  26.5 - 33.0 pg Final     MCHC 04/01/2016 33.3  31.5 - 36.5 g/dL  Final     RDW 04/01/2016 13.5  10.0 - 15.0 % Final     Platelet Count 04/01/2016 230  150 - 450 10e9/L Final     Diff Method 04/01/2016 Automated Method   Final     % Neutrophils 04/01/2016 76.6  % Final     % Lymphocytes 04/01/2016 12.6  % Final     % Monocytes 04/01/2016 7.5  % Final     % Eosinophils 04/01/2016 2.6  % Final     % Basophils 04/01/2016 0.1  % Final     % Immature Granulocytes 04/01/2016 0.6  % Final     Nucleated RBCs 04/01/2016 0  0 /100 Final     Absolute Neutrophil 04/01/2016 6.8  1.6 - 8.3 10e9/L Final     Absolute Lymphocytes 04/01/2016 1.1  0.8 - 5.3 10e9/L Final     Absolute Monocytes 04/01/2016 0.7  0.0 - 1.3 10e9/L Final     Absolute Eosinophils 04/01/2016 0.2  0.0 - 0.7 10e9/L Final     Absolute Basophils 04/01/2016 0.0  0.0 - 0.2 10e9/L Final     Abs Immature Granulocytes 04/01/2016 0.1  0 - 0.4 10e9/L Final     Absolute Nucleated RBC 04/01/2016 0.0   Final     Interpretation ECG 04/01/2016 Click View Image link to view waveform and result   Final     Troponin I ES 04/01/2016   0.000 - 0.045 ug/L Final                    Value:<0.015  The 99th percentile for upper reference range is 0.045 ug/L.  Troponin values in   the range of 0.045 - 0.120 ug/L may be associated with risks of adverse   clinical events.       D Dimer 04/01/2016 4.7* 0.0 - 0.50 ug/ml FEU Final     Interpretation ECG 04/01/2016 Click View Image link to view waveform and result   Final     In 2016, Dr. Eris James documented normal TSH and B12 levels    Imaging:  MRI brain 5/2016 shows mild generalized cortical atrophy and mild subcortical and periventricular T2/FLAIR hyperintensities.     Impression:  Ms. Craig is a 84 year old left-handed female who has been followed in [facility] for progressive memory loss. Her MMSE in Feb 2018 was 17/30, and MoCA score today is 11/30. She is in the moderate stages of Alzheimer's disease and tolerating full dose donepezil and memantine. BP under better control.      Recommendations:    Continue donepezil and memantine. Switching donepezil dosing to mornings given reports of more vivid dreams.     Check BP periodically at home    Continue staying socially and physically active    Will have  from [employer] contact Shaun about local resource and caregiver respite options.     Consider mood based drugs like Lexapro or Celexa in the future to treat anxiety    Return for follow up evaluation in 6 months    I spent a total of 40 minutes face-to-face with the patient, and over half of that time was spent counseling regarding the symptoms, diagnosis, medication risks, lifestyle modification, therapeutic options, and prognosis.    Again, thank you for allowing me to participate in the care of your patient.      Sincerely,    Natalio Gray MD

## 2018-08-24 NOTE — PATIENT INSTRUCTIONS
You saw saw Dr. Natalio Gray at the [facility] for an evaluation of memory loss, likely due to Alzheimer's disease, moderate stage.  Ziggy Cognitive Assessment score was 11/30. Your blood pressure was improved today    Recommendations:    Continue donepezil and memantine. Switch donepezil (Aricept) dosing to mornings.     Check BP periodically    Continue staying socially and physically active    Will have  from [employer] contact Shaun about local resource and caregiver respite options.     Consider mood based drugs like Lexapro or Celexa in the future to treat anxiety    Return for follow up evaluation in 6 months    Research opportunities:  1. [facility] Caregiver Registry (forms available in our waiting room)  2. Alzheimer[employer] TrialMatch:  http://[email or web address].org/research/clinical_trials/find_clinical_trials_trialmatch.asp  3. ClinicalTrials.gov

## 2018-08-24 NOTE — PROGRESS NOTES
"Chief Complaint: Follow up    History of Present Illness:  I had the pleasure of seeing Ms. Craig in follow up consultation for her symptoms of memory loss. She is accompanied to today's visit by her , Shaun, and daughter Venus, who assist in providing the history. Venus lives nearby and checks on her frequently.    Ms. Craig is an 84 year-old left-handed woman who has been followed in [facility] for progressive memory loss. Evaluations by Dr. Fitch and the occupational therapist Dionne Diaz have been consistent with diagnosis of Alzheimer's disease. She has been placed on donepezil.    She was last seen in Feb 2018. In the interim, her memory is slowly degrading. She is slow to know where they are going. She was able to start taking memantine and escalate the dose to 10 mg bid, without side effects or noticeable benefit. She cannot remember their current address, where they lived the past 18 years. She dreams more than she did in the past, and once woke up convinced she was in [facility].    In terms of day-to-day function, she reports that she is doing okay. \"Shaun takes good care of me.\" Shaun manages all her needs including medications suing a weekly pill dispenser, and he does the cooking. He prefers to do everything himself without getting outside help. She wears an ID bracelet, but they are not concerned about her wandering. She is no longer driving and has an ID card to replace her 's license.     They go to balance class twice a week. She attends Bible study and reads the newspaper daily. Her family keeps her busy. She is also a \"great yulissa\" She enjoys watching birds in the bird feeder. Her weight is stable, and she eats lots of fruits and vegetables.  They rarely check her BP at home. Venus has a BP cuff. She is back up to her previous dosage of diltiazem after her BP manoj when this tablet was cut in half.    Medical review of systems:  The comprehensive review of systems is otherwise " reviewed and negative.     Patient Active Problem List   Diagnosis     Post-op pain     ACP (advance care planning)     MCI (mild cognitive impairment)       Past Medical History:   Diagnosis Date     Arthritis      Complication of anesthesia      H/O transfusion     but not entire;y sure     Hypertension      Mumps      She had normal childhood development and reports no major head injuries.     Past Surgical History:   Procedure Laterality Date     BLADDER SURGERY       BUNIONECTOMY RT/LT       C OPEN RX ANKLE DISLOCATN+FIXATN      right     COLONOSCOPY       COLONOSCOPY  3/13/2012    Procedure:COLONOSCOPY; COLONOSCOPY ; Surgeon:LUCAS MOODY; Location: GI     GYN SURGERY      hysterectomy     HC REMOVAL OF TONSILS,<11 Y/O       OPEN REDUCTION INTERNAL FIXATION ANKLE Right 3/30/2016    Procedure: OPEN REDUCTION INTERNAL FIXATION ANKLE;  Surgeon: Denis Gaffney MD;  Location:  OR       Current Outpatient Prescriptions   Medication Sig Dispense Refill     aspirin 325 MG tablet Take 325 mg by mouth daily.       Calcium Carbonate-Vitamin D (CALCIUM 600+D PO) Take 2 tablets by mouth daily        donepezil (ARICEPT) 10 MG tablet TAKE 1 TABLET (10 MG) BY MOUTH AT BEDTIME 30 tablet 0     LISINOPRIL PO Take 20 mg by mouth daily       memantine (NAMENDA) 10 MG tablet Take 1 tablet (10 mg) by mouth 2 times daily Use as instructed 90 tablet 3     Multiple Vitamins-Minerals (SENIOR MULTIVITAMIN PLUS) TABS Take 1 tablet by mouth daily        simvastatin (ZOCOR) 20 MG tablet Take 20 mg by mouth At Bedtime.       VITAMIN D PO Take 50,000 Units by mouth every 30 days        VITAMIN D, CHOLECALCIFEROL, PO Take 2,000 Units by mouth daily       diltiazem (CARDIZEM CD; CARTIA XT) 360 MG 24 hr CD capsule Take 180 mg by mouth daily        memantine (NAMENDA) 5 MG tablet Take 1 tablet (5 mg) by mouth daily Increase dosage as instructed. (Patient not taking: Reported on 8/24/2018) 42 tablet 0        Allergies    Allergen Reactions     Sulfa Drugs      hives       Family History   Problem Relation Age of Onset     Other - See Comments Mother       at age 84 with cardiac disease      Myocardial Infarction Father       at age 70   Her mother had stroke at age 80  Her father had MI at age 70,  age 70  Brother Antonio passed away, age 62  There is no reported family history of neurodegenerative disease.     Social History     Social History     Marital status:      Spouse name: N/A     Number of children: N/A     Years of education: N/A     Occupational History     Not on file.     Social History Main Topics     Smoking status: Never Smoker     Smokeless tobacco: Never Used     Alcohol use 0.0 oz/week     0 Standard drinks or equivalent per week      Comment: one drink per week     Drug use: No     Sexual activity: No     Other Topics Concern     Not on file     Social History Narrative    Attended 1 year of college. Worked as  as and manager at a [employer]. Retired when moved to [facility], at age 53. 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 Met  Shaun at [facility]. Shaun is a retired  and is involved in community service.           General Physical examination:  /79 (BP Location: Right arm, Patient Position: Chair, Cuff Size: Adult Regular)  Pulse 56  Temp 97  F (36.1  C)  Resp 16  Wt 209 lb (94.8 kg)  BMI 32.73 kg/m2     General: Ms. Craig is well appearing and is appropriately groomed and dressed.  Chest: Clear to auscultation bilaterally.  Heart: Regular rhythm with no murmurs, rubs, or gallops.  Ext: warm, no edema  Skin: clean, dry, intact    Neurological examination:  Mental status: Ms. Craig  is awake, alert, and appropriate, with fluent speech, no word finding difficulties and no difficulty in answering questions. Unable to say current address. No apraxia is noted.  Cranial nerves: Visual fields are full to confrontation with no visual  extinction. Extraocular movements are intact. Smooth pursuit is intact. Saccades are normal in initiation, velocity and amplitude both vertically and horizontally. Facial sensation is intact to light touch. Facial strength is full bilaterally. She is hard of hearing but able to understand. The tongue protrudes in the midline with intact strength. There are no tongue fasciculations noted. The soft palate elevates symmetrically. Sternocledomastoid and trapezius muscle strength is full bilaterally.  Motor examination: Bulk is normal throughout. Axial and upper and lower extremity tone is normal. No pronator drift is noted. Strength is 5/5 and symmetric throughout. No fasciculations are noted. There is no tremor or other involuntary movement.  Sensory examination: Sensation is intact to vibratory sense and proprioception. There is no cortical sensory loss by graphesthesia, or neglect.  Reflexes: Reflexes are symmetric and 2+ at biceps, triceps, and brachioradialis. Patellar reflexes are 2+ bilaterally. Achilles reflexes are not obtainable bilaterally.   Coordination: Finger-nose-finger is normal with no dysmetria bilaterally.  Rapid opening and closing of fist is not slowed. Foot tapping is not slowed.  Gait: She has an upright and steady stance with normal stride length and arm swing. There is no retropulsion.    Brief neuropsychological evaluation:  On global testing, using the MoCA, Ms. Craig scores 11/30. She gets 3/5 on drawing clock, 2/3 on naming animals, 0/2 on repetition, 0/2 on abstraction, 0/5 on recall, and 2/6 on orientation.    Labs:  Office Visit on 12/06/2017   Component Date Value Ref Range Status     Specimen Description 12/06/2017 Vagina   Final     Wet Prep 12/06/2017 No yeast seen   Final     Wet Prep 12/06/2017 No Trichomonas seen   Final     Wet Prep 12/06/2017 No clue cells seen   Final   Office Visit on 11/22/2017   Component Date Value Ref Range Status     Color Urine 11/22/2017 Yellow    Final     Appearance Urine 11/22/2017 Clear   Final     Glucose Urine 11/22/2017 Negative  NEG^Negative mg/dL Final     Bilirubin Urine 11/22/2017 Negative  NEG^Negative Final     Ketones Urine 11/22/2017 Negative  NEG^Negative mg/dL Final     Specific Gravity Urine 11/22/2017 1.015  1.003 - 1.035 Final     Blood Urine 11/22/2017 Negative  NEG^Negative Final     pH Urine 11/22/2017 7.5* 5.0 - 7.0 pH Final     Protein Albumin Urine 11/22/2017 Negative  NEG^Negative mg/dL Final     Urobilinogen Urine 11/22/2017 1.0  0.2 - 1.0 EU/dL Final     Nitrite Urine 11/22/2017 Negative  NEG^Negative Final     Leukocyte Esterase Urine 11/22/2017 Negative  NEG^Negative Final     Source 11/22/2017 Midstream Urine   Final   Office Visit on 10/18/2017   Component Date Value Ref Range Status     Color Urine 10/18/2017 Yellow   Final     Appearance Urine 10/18/2017 Clear   Final     Glucose Urine 10/18/2017 Negative  NEG^Negative mg/dL Final     Bilirubin Urine 10/18/2017 Negative  NEG^Negative Final     Ketones Urine 10/18/2017 Negative  NEG^Negative mg/dL Final     Specific Gravity Urine 10/18/2017 1.015  1.003 - 1.035 Final     Blood Urine 10/18/2017 Negative  NEG^Negative Final     pH Urine 10/18/2017 7.0  5.0 - 7.0 pH Final     Protein Albumin Urine 10/18/2017 Trace* NEG^Negative mg/dL Final     Urobilinogen Urine 10/18/2017 2.0* 0.2 - 1.0 EU/dL Final     Nitrite Urine 10/18/2017 Positive* NEG^Negative Final     Leukocyte Esterase Urine 10/18/2017 Small* NEG^Negative Final     Source 10/18/2017 Midstream Urine   Final     Specimen Description 10/18/2017 Midstream Urine   Final     Special Requests 10/18/2017 Specimen received in preservative   Final     Culture Micro 10/18/2017 *  Final                    Value:>100,000 colonies/mL  Escherichia coli       Culture Micro 10/18/2017    Final                    Value:<10,000 colonies/mL  mixed urogenital lina  Susceptibility testing not routinely done       Specimen Description  10/18/2017 Midstream Urine   Final     Special Requests 10/18/2017 Specimen received in [place] Transport Media   Final     Culture Micro 10/18/2017 Ureaplasma species isolated*  Final     Specimen Description 10/18/2017 Midstream Urine   Final     Special Requests 10/18/2017 Specimen received in [place] [employer]   Final     Culture Micro 10/18/2017 No large colony Mycoplasma species isolated   Final   Admission on 03/30/2016, Discharged on 04/02/2016   Component Date Value Ref Range Status     Creatinine 03/30/2016 0.93  0.52 - 1.04 mg/dL Final     GFR Estimate 03/30/2016 57* >60 mL/min/1.7m2 Final     GFR Estimate If Black 03/30/2016 70  >60 mL/min/1.7m2 Final     Potassium 03/30/2016 4.3  3.4 - 5.3 mmol/L Final     Sodium 04/01/2016 137  133 - 144 mmol/L Final     Potassium 04/01/2016 4.1  3.4 - 5.3 mmol/L Final     Chloride 04/01/2016 104  94 - 109 mmol/L Final     Carbon Dioxide 04/01/2016 29  20 - 32 mmol/L Final     Anion Gap 04/01/2016 4  3 - 14 mmol/L Final     Glucose 04/01/2016 122* 70 - 99 mg/dL Final     Urea Nitrogen 04/01/2016 19  7 - 30 mg/dL Final     Creatinine 04/01/2016 0.80  0.52 - 1.04 mg/dL Final     GFR Estimate 04/01/2016 69  >60 mL/min/1.7m2 Final     GFR Estimate If Black 04/01/2016 83  >60 mL/min/1.7m2 Final     Calcium 04/01/2016 8.6  8.5 - 10.1 mg/dL Final     WBC 04/01/2016 8.8  4.0 - 11.0 10e9/L Final     RBC Count 04/01/2016 3.85  3.8 - 5.2 10e12/L Final     Hemoglobin 04/01/2016 11.7  11.7 - 15.7 g/dL Final     Hematocrit 04/01/2016 35.1  35.0 - 47.0 % Final     MCV 04/01/2016 91  78 - 100 fl Final     MCH 04/01/2016 30.4  26.5 - 33.0 pg Final     MCHC 04/01/2016 33.3  31.5 - 36.5 g/dL Final     RDW 04/01/2016 13.5  10.0 - 15.0 % Final     Platelet Count 04/01/2016 230  150 - 450 10e9/L Final     Diff Method 04/01/2016 Automated Method   Final     % Neutrophils 04/01/2016 76.6  % Final     % Lymphocytes 04/01/2016 12.6  % Final     % Monocytes 04/01/2016 7.5  % Final      % Eosinophils 04/01/2016 2.6  % Final     % Basophils 04/01/2016 0.1  % Final     % Immature Granulocytes 04/01/2016 0.6  % Final     Nucleated RBCs 04/01/2016 0  0 /100 Final     Absolute Neutrophil 04/01/2016 6.8  1.6 - 8.3 10e9/L Final     Absolute Lymphocytes 04/01/2016 1.1  0.8 - 5.3 10e9/L Final     Absolute Monocytes 04/01/2016 0.7  0.0 - 1.3 10e9/L Final     Absolute Eosinophils 04/01/2016 0.2  0.0 - 0.7 10e9/L Final     Absolute Basophils 04/01/2016 0.0  0.0 - 0.2 10e9/L Final     Abs Immature Granulocytes 04/01/2016 0.1  0 - 0.4 10e9/L Final     Absolute Nucleated RBC 04/01/2016 0.0   Final     Interpretation ECG 04/01/2016 Click View Image link to view waveform and result   Final     Troponin I ES 04/01/2016   0.000 - 0.045 ug/L Final                    Value:<0.015  The 99th percentile for upper reference range is 0.045 ug/L.  Troponin values in   the range of 0.045 - 0.120 ug/L may be associated with risks of adverse   clinical events.       D Dimer 04/01/2016 4.7* 0.0 - 0.50 ug/ml FEU Final     Interpretation ECG 04/01/2016 Click View Image link to view waveform and result   Final     In 2016, Dr. Eris James documented normal TSH and B12 levels    Imaging:  MRI brain 5/2016 shows mild generalized cortical atrophy and mild subcortical and periventricular T2/FLAIR hyperintensities.     Impression:  Ms. Craig is a 84 year old left-handed female who has been followed in [facility] for progressive memory loss. Her MMSE in Feb 2018 was 17/30, and MoCA score today is 11/30. She is in the moderate stages of Alzheimer's disease and tolerating full dose donepezil and memantine. BP under better control.     Recommendations:    Continue donepezil and memantine. Switching donepezil dosing to mornings given reports of more vivid dreams.     Check BP periodically at home    Continue staying socially and physically active    Will have  from Alzheimer's [employer] contact Shaun ras local  resource and caregiver respite options.     Consider mood based drugs like Lexapro or Celexa in the future to treat anxiety    Return for follow up evaluation in 6 months    I spent a total of 40 minutes face-to-face with the patient, and over half of that time was spent counseling regarding the symptoms, diagnosis, medication risks, lifestyle modification, therapeutic options, and prognosis.

## 2018-08-24 NOTE — MR AVS SNAPSHOT
After Visit Summary   8/24/2018    Chris Craig    MRN: [id number]           Patient Information     Date Of Birth          5/21/1934        Visit Information        Provider Department      8/24/2018 9:15 AM Natalio Gray MD [facility]        Care Instructions    You saw saw Dr. Natalio Gray at the [facility] for an evaluation of memory loss, likely due to Alzheimer's disease, moderate stage.  Ziggy Cognitive Assessment score was 11/30. Your blood pressure was improved today    Recommendations:    Continue donepezil and memantine. Switch donepezil (Aricept) dosing to mornings.     Check BP periodically    Continue staying socially and physically active    Will have  from Alzheimer's [employer] contact Shaun about local resource and caregiver respite options.     Consider mood based drugs like Lexapro or Celexa in the future to treat anxiety    Return for follow up evaluation in 6 months    Research opportunities:  1. [facility] Caregiver Registry (forms available in our waiting room)  2. Alzheimer's Association TrialMatch:  http://www.alz.org/research/clinical_trials/find_clinical_trials_trialmatch.asp  3. ClinicalTrials.gov               Follow-ups after your visit        Follow-up notes from your care team     Return in about 6 months (around 2/24/2019).      Your next 10 appointments already scheduled     Mar 08, 2019  8:30 AM CST   Return Visit with Natalio Gray MD   [facility] ([facility])    [address] [id number] [phone]              Who to contact     Please call your clinic at [phone] to:    Ask questions about your health    Make or cancel appointments    Discuss your medicines    Learn about your test results    Speak to your doctor            Additional Information About Your Visit        MyChart Information     [facility] gives you secure access to your  electronic health record. If you see a primary care provider, you can also send messages to your care team and make appointments. If you have questions, please call your primary care clinic.  If you do not have a primary care provider, please call [phone] and they will assist you.      [employer] is an electronic gateway that provides easy, online access to your medical records. With [employer], you can request a clinic appointment, read your test results, renew a prescription or communicate with your care team.     To access your existing account, please contact your [facility] or call [phone] for assistance.        Care EveryWhere ID     This is your Care EveryWhere ID. This could be used by other organizations to access your [facility] medical records  [id number]        Your Vitals Were     Pulse Temperature Respirations BMI (Body Mass Index)          56 97  F (36.1  C) 16 32.73 kg/m2         Blood Pressure from Last 3 Encounters:   08/24/18 141/79   02/09/18 179/78   12/06/17 134/64    Weight from Last 3 Encounters:   08/24/18 209 lb (94.8 kg)   02/09/18 207 lb 6.4 oz (94.1 kg)   12/06/17 207 lb 11.2 oz (94.2 kg)              Today, you had the following     No orders found for display       Primary Care Provider Office Phone # Fax #    [place] [phone]       [facility] [address]  [place] [address]        Equal Access to Services     [facility]: Hadii aad ku hadasho Soomaali, waaxda luqadaha, qaybta kaalmada adeegyada, florentino farias . So [facility] [phone].    ATENCIÓN: Si habla español, tiene a van disposición servicios gratuitos de asistencia lingüística. Llame al [phone].    We comply with applicable federal civil rights laws and [place] laws. We do not discriminate on the basis of race, color, national origin, age, disability, sex, sexual orientation, or gender identity.             Thank you!     Thank you for choosing [facility]  for your care. Our goal is always to provide you with excellent care. Hearing back from our patients is one way we can continue to improve our services. Please take a few minutes to complete the written survey that you may receive in the mail after your visit with us. Thank you!             Your Updated Medication List - Protect others around you: Learn how to safely use, store and throw away your medicines at www.disposemymeds.org.          This list is accurate as of 8/24/18 10:09 AM.  Always use your most recent med list.                   Brand Name Dispense Instructions for use Diagnosis    aspirin 325 MG tablet      Take 325 mg by mouth daily.        CALCIUM 600+D PO      Take 2 tablets by mouth daily        diltiazem 360 MG 24 hr CD capsule    CARDIZEM CD; CARTIA XT     Take 180 mg by mouth daily        donepezil 10 MG tablet    ARICEPT    30 tablet    TAKE 1 TABLET (10 MG) BY MOUTH AT BEDTIME    MCI (mild cognitive impairment)       LISINOPRIL PO      Take 20 mg by mouth daily        * memantine 5 MG tablet    NAMENDA    42 tablet    Take 1 tablet (5 mg) by mouth daily Increase dosage as instructed.    Late onset Alzheimer's disease without behavioral disturbance       * memantine 10 MG tablet    NAMENDA    90 tablet    Take 1 tablet (10 mg) by mouth 2 times daily Use as instructed    Late onset Alzheimer's disease without behavioral disturbance       SENIOR MULTIVITAMIN PLUS Tabs      Take 1 tablet by mouth daily        simvastatin 20 MG tablet    ZOCOR     Take 20 mg by mouth At Bedtime.        * VITAMIN D (CHOLECALCIFEROL) PO      Take 2,000 Units by mouth daily        * VITAMIN D PO      Take 50,000 Units by mouth every 30 days        * Notice:  This list has 4 medication(s) that are the same as other medications prescribed for you. Read the directions carefully, and ask your doctor or other care provider to review them with you.

## 2018-09-04 DIAGNOSIS — G30.1 LATE ONSET ALZHEIMER'S DISEASE WITHOUT BEHAVIORAL DISTURBANCE (H): Primary | ICD-10-CM

## 2018-09-04 DIAGNOSIS — F02.80 LATE ONSET ALZHEIMER'S DISEASE WITHOUT BEHAVIORAL DISTURBANCE (H): Primary | ICD-10-CM

## 2018-09-04 RX ORDER — MEMANTINE HYDROCHLORIDE 10 MG/1
10 TABLET ORAL 2 TIMES DAILY
Qty: 90 TABLET | Refills: 3 | Status: SHIPPED | OUTPATIENT
Start: 2018-09-04 | End: 2019-03-05

## 2019-03-04 DIAGNOSIS — F02.80 LATE ONSET ALZHEIMER'S DISEASE WITHOUT BEHAVIORAL DISTURBANCE (H): ICD-10-CM

## 2019-03-04 DIAGNOSIS — G30.1 LATE ONSET ALZHEIMER'S DISEASE WITHOUT BEHAVIORAL DISTURBANCE (H): ICD-10-CM

## 2019-03-05 RX ORDER — MEMANTINE HYDROCHLORIDE 10 MG/1
10 TABLET ORAL 2 TIMES DAILY
Qty: 90 TABLET | Refills: 3 | Status: SHIPPED | OUTPATIENT
Start: 2019-03-05 | End: 2019-09-03

## 2019-03-08 ENCOUNTER — OFFICE VISIT (OUTPATIENT)
Dept: NEUROLOGY | Facility: CLINIC | Age: 84
End: 2019-03-08
Payer: MEDICARE

## 2019-03-08 VITALS
DIASTOLIC BLOOD PRESSURE: 64 MMHG | BODY MASS INDEX: 32.33 KG/M2 | SYSTOLIC BLOOD PRESSURE: 136 MMHG | WEIGHT: 206.4 LBS | RESPIRATION RATE: 16 BRPM

## 2019-03-08 DIAGNOSIS — G30.1 LATE ONSET ALZHEIMER'S DISEASE WITHOUT BEHAVIORAL DISTURBANCE (H): Primary | ICD-10-CM

## 2019-03-08 DIAGNOSIS — F02.80 LATE ONSET ALZHEIMER'S DISEASE WITHOUT BEHAVIORAL DISTURBANCE (H): Primary | ICD-10-CM

## 2019-03-08 ASSESSMENT — PAIN SCALES - GENERAL: PAINLEVEL: NO PAIN (0)

## 2019-03-08 NOTE — LETTER
"3/8/2019     RE: Chris Craig  [address]  [facility] [address]     Dear Colleague,    Thank you for referring your patient, Chris Craig, to the [facility] at [facility]. Please see a copy of my visit note below.    Chief Complaint: Follow up    History of Present Illness:  I had the pleasure of seeing Ms. Craig in follow up consultation for her symptoms of memory loss. She is accompanied to today's visit by her , Shaun, who assists in providing the history. Venus lives nearby and checks on her frequently.    Ms. Craig is an 84 year-old left-handed woman who [facility] for [facility] memory loss. Evaluations have been consistent with diagnosis of Alzheimer's disease. She is currently on donepezil and memantine.     She was last seen in August 2018. In the interim, her cognitive status has been fairly stable. As before, she says \"Shaun takes good care of me.\" On periodic BP checks at home, her SBP is in the 130s. She sleeps from 8:30 p to 7a and has no snoring or witnessed apnea. She reports dreams that are not disturbing. She is looking forward to celebrating her daughter's birthday tomorrow.    She reads the newspaper and continues to attend Bible groups, but they have had more difficulty getting out of the house with the snowy conditions. [facility] [employer] [facility] [employer] reached out to Dilma, but he's alreaady aware of local resources.    She is mostly in good spirits, but can get impatient waiting on meals. She is enrolled in an exercise program, one hour twice a week. She does well putting together jigsaw puzzles.     Her level of needed care has not changed. Shaun monitors her pills, and helps with meals, appointments and monitoring medications. They shower together. If Shaun is away, he has others prepare her meals. Her three daughters visit on Saturdays.    Medical review of systems:  The comprehensive review of systems is " otherwise reviewed and negative.     Patient Active Problem List   Diagnosis     Post-op pain     ACP (advance care planning)     MCI (mild cognitive impairment)       Past Medical History:   Diagnosis Date     Arthritis      Complication of anesthesia      H/O transfusion     but not entire;y sure     Hypertension      Mumps      She had normal childhood development and reports no major head injuries.     Past Surgical History:   Procedure Laterality Date     BLADDER SURGERY       BUNIONECTOMY RT/LT       C OPEN RX ANKLE DISLOCATN+FIXATN      right     COLONOSCOPY       COLONOSCOPY  3/13/2012    Procedure:COLONOSCOPY; COLONOSCOPY ; Surgeon:LUCAS MOODY; Location: GI     GYN SURGERY      hysterectomy     HC REMOVAL OF TONSILS,<13 Y/O       OPEN REDUCTION INTERNAL FIXATION ANKLE Right 3/30/2016    Procedure: OPEN REDUCTION INTERNAL FIXATION ANKLE;  Surgeon: Denis Gaffney MD;  Location:  OR       Current Outpatient Medications   Medication Sig Dispense Refill     aspirin 325 MG tablet Take 81 mg by mouth daily        Calcium Carbonate-Vitamin D (CALCIUM 600+D PO) Take 2 tablets by mouth daily        diltiazem (CARDIZEM CD; CARTIA XT) 360 MG 24 hr CD capsule Take 180 mg by mouth daily        donepezil (ARICEPT) 10 MG tablet TAKE 1 TABLET (10 MG) BY MOUTH AT BEDTIME (Patient taking differently: TAKE 1 TABLET (10 MG) BY MOUTH IN THE MORNING) 30 tablet 0     LISINOPRIL PO Take 20 mg by mouth daily       memantine (NAMENDA) 10 MG tablet Take 1 tablet (10 mg) by mouth 2 times daily Use as instructed 90 tablet 3     Multiple Vitamins-Minerals (SENIOR MULTIVITAMIN PLUS) TABS Take 1 tablet by mouth daily        simvastatin (ZOCOR) 20 MG tablet Take 20 mg by mouth At Bedtime.       VITAMIN D PO Take 50,000 Units by mouth every 30 days        VITAMIN D, CHOLECALCIFEROL, PO Take 2,000 Units by mouth daily          Allergies   Allergen Reactions     Sulfa Drugs      hives       Family History   Problem  Relation Age of Onset     Other - See Comments Mother          at age 84 with cardiac disease      Myocardial Infarction Father          at age 70   Her mother had stroke at age 80  Her father had MI at age 70,  age 70  Brother Antonio passed away, age 62  5 children  There is no reported family history of neurodegenerative disease.     Social History     Socioeconomic History     Marital status:      Spouse name: Not on file     Number of children: Not on file     Years of education: Not on file     Highest education level: Not on file   Occupational History     Not on file   Social Needs     Financial resource strain: Not on file     Food insecurity:     Worry: Not on file     Inability: Not on file     Transportation needs:     Medical: Not on file     Non-medical: Not on file   Tobacco Use     Smoking status: Never Smoker     Smokeless tobacco: Never Used   Substance and Sexual Activity     Alcohol use: Yes     Alcohol/week: 0.0 oz     Comment: one drink per week     Drug use: No     Sexual activity: No   Lifestyle     Physical activity:     Days per week: Not on file     Minutes per session: Not on file     Stress: Not on file   Relationships     Social connections:     Talks on phone: Not on file     Gets together: Not on file     Attends Synagogue service: Not on file     Active member of club or organization: Not on file     Attends meetings of clubs or organizations: Not on file     Relationship status: Not on file     Intimate partner violence:     Fear of current or ex partner: Not on file     Emotionally abused: Not on file     Physically abused: Not on file     Forced sexual activity: Not on file   Other Topics Concern     Parent/sibling w/ CABG, MI or angioplasty before 65F 55M? Not Asked   Social History Narrative    Attended 1 year of college. Worked as  as and manager at a [employer]. Retired when moved to [facility], at age 53. 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 Met  Shaun at [facility]. Shaun is a retired  and is involved in community service.       General Physical examination:  /64   Resp 16   Wt 206 lb 6.4 oz (93.6 kg)   BMI 32.33 kg/m        General: Ms. Craig is well appearing and is appropriately groomed and dressed.  Chest: Clear to auscultation bilaterally.  Heart: Regular rhythm with no murmurs, rubs, or gallops.  Ext: warm, no edema  Skin: clean, dry, intact    Neurological examination:  Mental status: Ms. Craig  is awake, alert, and appropriate, with fluent speech, no word finding difficulties and no difficulty in answering questions. She recalls that she has 5 children, and that her son Ellis lives in [place]. No apraxia is noted.  Cranial nerves: Visual fields are full to confrontation with no visual extinction. Extraocular movements are intact. Smooth pursuit is intact. Saccades are normal in initiation, velocity and amplitude both vertically and horizontally. Facial sensation is intact to light touch. Facial strength is full bilaterally. She is hard of hearing but able to understand. The tongue protrudes in the midline with intact strength. There are no tongue fasciculations noted. The soft palate elevates symmetrically. Sternocledomastoid and trapezius muscle strength is full bilaterally.  Motor examination: Bulk is normal throughout. Axial and upper and lower extremity tone is normal. No pronator drift is noted. Strength is 5/5 and symmetric throughout. No fasciculations are noted. There is no tremor or other involuntary movement.  Reflexes: Reflexes are symmetric and 2+ at biceps, triceps, and brachioradialis. Patellar reflexes are 2+ bilaterally. Achilles reflexes are not obtainable bilaterally.   Gait: She has an upright and steady stance with normal stride length and no arm swing. There is no retropulsion.    Brief neuropsychological evaluation:  On global testing, using the MMSE, Ms. Craig  scores 18/30, with points deducted for orientation (-9) and recall (-3).    Labs:  Office Visit on 12/06/2017   Component Date Value Ref Range Status     Specimen Description 12/06/2017 Vagina   Final     Wet Prep 12/06/2017 No yeast seen   Final     Wet Prep 12/06/2017 No Trichomonas seen   Final     Wet Prep 12/06/2017 No clue cells seen   Final   Office Visit on 11/22/2017   Component Date Value Ref Range Status     Color Urine 11/22/2017 Yellow   Final     Appearance Urine 11/22/2017 Clear   Final     Glucose Urine 11/22/2017 Negative  NEG^Negative mg/dL Final     Bilirubin Urine 11/22/2017 Negative  NEG^Negative Final     Ketones Urine 11/22/2017 Negative  NEG^Negative mg/dL Final     Specific Gravity Urine 11/22/2017 1.015  1.003 - 1.035 Final     Blood Urine 11/22/2017 Negative  NEG^Negative Final     pH Urine 11/22/2017 7.5* 5.0 - 7.0 pH Final     Protein Albumin Urine 11/22/2017 Negative  NEG^Negative mg/dL Final     Urobilinogen Urine 11/22/2017 1.0  0.2 - 1.0 EU/dL Final     Nitrite Urine 11/22/2017 Negative  NEG^Negative Final     Leukocyte Esterase Urine 11/22/2017 Negative  NEG^Negative Final     Source 11/22/2017 Midstream Urine   Final   Office Visit on 10/18/2017   Component Date Value Ref Range Status     Color Urine 10/18/2017 Yellow   Final     Appearance Urine 10/18/2017 Clear   Final     Glucose Urine 10/18/2017 Negative  NEG^Negative mg/dL Final     Bilirubin Urine 10/18/2017 Negative  NEG^Negative Final     Ketones Urine 10/18/2017 Negative  NEG^Negative mg/dL Final     Specific Gravity Urine 10/18/2017 1.015  1.003 - 1.035 Final     Blood Urine 10/18/2017 Negative  NEG^Negative Final     pH Urine 10/18/2017 7.0  5.0 - 7.0 pH Final     Protein Albumin Urine 10/18/2017 Trace* NEG^Negative mg/dL Final     Urobilinogen Urine 10/18/2017 2.0* 0.2 - 1.0 EU/dL Final     Nitrite Urine 10/18/2017 Positive* NEG^Negative Final     Leukocyte Esterase Urine 10/18/2017 Small* NEG^Negative Final     Source  10/18/2017 Midstream Urine   Final     Specimen Description 10/18/2017 Midstream Urine   Final     Special Requests 10/18/2017 Specimen received in preservative   Final     Culture Micro 10/18/2017 *  Final                    Value:>100,000 colonies/mL  Escherichia coli       Culture Micro 10/18/2017    Final                    Value:<10,000 colonies/mL  mixed urogenital lina  Susceptibility testing not routinely done       Specimen Description 10/18/2017 Midstream Urine   Final     Special Requests 10/18/2017 Specimen received in [place] [employer]   Final     Culture Micro 10/18/2017 Ureaplasma species isolated*  Final     Specimen Description 10/18/2017 Midstream Urine   Final     Special Requests 10/18/2017 Specimen received in [place] [employer]   Final     Culture Micro 10/18/2017 No large colony Mycoplasma species isolated   Final   Admission on 03/30/2016, Discharged on 04/02/2016   Component Date Value Ref Range Status     Creatinine 03/30/2016 0.93  0.52 - 1.04 mg/dL Final     GFR Estimate 03/30/2016 57* >60 mL/min/1.7m2 Final     GFR Estimate If Black 03/30/2016 70  >60 mL/min/1.7m2 Final     Potassium 03/30/2016 4.3  3.4 - 5.3 mmol/L Final     Sodium 04/01/2016 137  133 - 144 mmol/L Final     Potassium 04/01/2016 4.1  3.4 - 5.3 mmol/L Final     Chloride 04/01/2016 104  94 - 109 mmol/L Final     Carbon Dioxide 04/01/2016 29  20 - 32 mmol/L Final     Anion Gap 04/01/2016 4  3 - 14 mmol/L Final     Glucose 04/01/2016 122* 70 - 99 mg/dL Final     Urea Nitrogen 04/01/2016 19  7 - 30 mg/dL Final     Creatinine 04/01/2016 0.80  0.52 - 1.04 mg/dL Final     GFR Estimate 04/01/2016 69  >60 mL/min/1.7m2 Final     GFR Estimate If Black 04/01/2016 83  >60 mL/min/1.7m2 Final     Calcium 04/01/2016 8.6  8.5 - 10.1 mg/dL Final     WBC 04/01/2016 8.8  4.0 - 11.0 10e9/L Final     RBC Count 04/01/2016 3.85  3.8 - 5.2 10e12/L Final     Hemoglobin 04/01/2016 11.7  11.7 - 15.7 g/dL Final     Hematocrit  04/01/2016 35.1  35.0 - 47.0 % Final     MCV 04/01/2016 91  78 - 100 fl Final     MCH 04/01/2016 30.4  26.5 - 33.0 pg Final     MCHC 04/01/2016 33.3  31.5 - 36.5 g/dL Final     RDW 04/01/2016 13.5  10.0 - 15.0 % Final     Platelet Count 04/01/2016 230  150 - 450 10e9/L Final     Diff Method 04/01/2016 Automated Method   Final     % Neutrophils 04/01/2016 76.6  % Final     % Lymphocytes 04/01/2016 12.6  % Final     % Monocytes 04/01/2016 7.5  % Final     % Eosinophils 04/01/2016 2.6  % Final     % Basophils 04/01/2016 0.1  % Final     % Immature Granulocytes 04/01/2016 0.6  % Final     Nucleated RBCs 04/01/2016 0  0 /100 Final     Absolute Neutrophil 04/01/2016 6.8  1.6 - 8.3 10e9/L Final     Absolute Lymphocytes 04/01/2016 1.1  0.8 - 5.3 10e9/L Final     Absolute Monocytes 04/01/2016 0.7  0.0 - 1.3 10e9/L Final     Absolute Eosinophils 04/01/2016 0.2  0.0 - 0.7 10e9/L Final     Absolute Basophils 04/01/2016 0.0  0.0 - 0.2 10e9/L Final     Abs Immature Granulocytes 04/01/2016 0.1  0 - 0.4 10e9/L Final     Absolute Nucleated RBC 04/01/2016 0.0   Final     Interpretation ECG 04/01/2016 Click View Image link to view waveform and result   Final     Troponin I ES 04/01/2016   0.000 - 0.045 ug/L Final                    Value:<0.015  The 99th percentile for upper reference range is 0.045 ug/L.  Troponin values in   the range of 0.045 - 0.120 ug/L may be associated with risks of adverse   clinical events.       D Dimer 04/01/2016 4.7* 0.0 - 0.50 ug/ml FEU Final     Interpretation ECG 04/01/2016 Click View Image link to view waveform and result   Final     Normal TSH and B12 levels documented in 2016.    Imaging:  MRI brain 5/2016 shows mild generalized cortical atrophy and mild subcortical and periventricular T2/FLAIR hyperintensities.     Impression:  Ms. Rafa is a 84 year old left-handed female who [facility] in [facility] for [facility] memory loss. Her MMSE in Feb 2018 was 17/30, MoCA score in Aug 2018 was  11/30, and MMSE today is 18/20. She is in the moderate stages of Alzheimer's disease and tolerating full dose donepezil and memantine. BP is under better control.     Recommendations:    Continue donepezil and memantine.     Check BP periodically at home    Continue staying socially and physically active    Monitor mood.     Return for follow up evaluation in 6 months    I spent a total of 40 minutes face-to-face with the patient, and over half of that time was spent counseling regarding the symptoms, diagnosis, medication risks, lifestyle modification, therapeutic options, and prognosis.    Again, thank you for allowing me to participate in the care of your patient.      Sincerely,    Natalio Gray MD

## 2019-03-08 NOTE — PROGRESS NOTES
"Chief Complaint: Follow up    History of Present Illness:  I had the pleasure of seeing Ms. Craig in follow up consultation for her symptoms of memory loss. She is accompanied to today's visit by her , Shaun, who assists in providing the history. Venus lives nearby and checks on her frequently.    Ms. Craig is an 84 year-old left-handed woman who has been [facility] in [facility] for progressive memory loss. Evaluations have been consistent with diagnosis of Alzheimer's disease. She is currently on donepezil and memantine.     She was last seen in August 2018. In the interim, her cognitive status has been fairly stable. As before, she says \"Shaun takes good care of me.\" On periodic BP checks at home, her SBP is in the 130s. She sleeps from 8:30 p to 7a and has no snoring or witnessed apnea. She reports dreams that are not disturbing. She is looking forward to celebrating her daughter's birthday tomorrow.    She reads the newspaper and continues to attend Bible groups, but they have had more difficulty getting out of the house with the snowy conditions. The [employer] reached out to Dilma, but he's alreaady aware of local resources.    She is mostly in good spirits, but can get impatient waiting on meals. She is enrolled in an exercise program, one hour twice a week. She does well putting together jigsaw puzzles.     Her level of needed care has not changed. Shaun monitors her pills, and helps with meals, appointments and monitoring medications. They shower together. If Shaun is away, he has others prepare her meals. Her three daughters visit on Saturdays.    Medical review of systems:  The comprehensive review of systems is otherwise reviewed and negative.     Patient Active Problem List   Diagnosis     Post-op pain     ACP (advance care planning)     MCI (mild cognitive impairment)       Past Medical History:   Diagnosis Date     Arthritis      Complication of anesthesia      H/O transfusion     but not " entire;y sure     Hypertension      Mumps      She had normal childhood development and reports no major head injuries.     Past Surgical History:   Procedure Laterality Date     BLADDER SURGERY       BUNIONECTOMY RT/LT       C OPEN RX ANKLE DISLOCATN+FIXATN      right     COLONOSCOPY       COLONOSCOPY  3/13/2012    Procedure:COLONOSCOPY; COLONOSCOPY ; Surgeon:LUCAS MOODY; Location: GI     GYN SURGERY      hysterectomy     HC REMOVAL OF TONSILS,<11 Y/O       OPEN REDUCTION INTERNAL FIXATION ANKLE Right 3/30/2016    Procedure: OPEN REDUCTION INTERNAL FIXATION ANKLE;  Surgeon: Denis Gaffney MD;  Location:  OR       Current Outpatient Medications   Medication Sig Dispense Refill     aspirin 325 MG tablet Take 81 mg by mouth daily        Calcium Carbonate-Vitamin D (CALCIUM 600+D PO) Take 2 tablets by mouth daily        diltiazem (CARDIZEM CD; CARTIA XT) 360 MG 24 hr CD capsule Take 180 mg by mouth daily        donepezil (ARICEPT) 10 MG tablet TAKE 1 TABLET (10 MG) BY MOUTH AT BEDTIME (Patient taking differently: TAKE 1 TABLET (10 MG) BY MOUTH IN THE MORNING) 30 tablet 0     LISINOPRIL PO Take 20 mg by mouth daily       memantine (NAMENDA) 10 MG tablet Take 1 tablet (10 mg) by mouth 2 times daily Use as instructed 90 tablet 3     Multiple Vitamins-Minerals (SENIOR MULTIVITAMIN PLUS) TABS Take 1 tablet by mouth daily        simvastatin (ZOCOR) 20 MG tablet Take 20 mg by mouth At Bedtime.       VITAMIN D PO Take 50,000 Units by mouth every 30 days        VITAMIN D, CHOLECALCIFEROL, PO Take 2,000 Units by mouth daily          Allergies   Allergen Reactions     Sulfa Drugs      hives       Family History   Problem Relation Age of Onset     Other - See Comments Mother          at age 84 with cardiac disease      Myocardial Infarction Father          at age 70   Her mother had stroke at age 80  Her father had MI at age 70,  age 70  Brother Antonio passed away, age 62  5 children  There is no  reported family history of neurodegenerative disease.     Social History     Socioeconomic History     Marital status:      Spouse name: Not on file     Number of children: Not on file     Years of education: Not on file     Highest education level: Not on file   Occupational History     Not on file   Social Needs     Financial resource strain: Not on file     Food insecurity:     Worry: Not on file     Inability: Not on file     Transportation needs:     Medical: Not on file     Non-medical: Not on file   Tobacco Use     Smoking status: Never Smoker     Smokeless tobacco: Never Used   Substance and Sexual Activity     Alcohol use: Yes     Alcohol/week: 0.0 oz     Comment: one drink per week     Drug use: No     Sexual activity: No   Lifestyle     Physical activity:     Days per week: Not on file     Minutes per session: Not on file     Stress: Not on file   Relationships     Social connections:     Talks on phone: Not on file     Gets together: Not on file     Attends Muslim service: Not on file     Active member of club or organization: Not on file     Attends meetings of clubs or organizations: Not on file     Relationship status: Not on file     Intimate partner violence:     Fear of current or ex partner: Not on file     Emotionally abused: Not on file     Physically abused: Not on file     Forced sexual activity: Not on file   Other Topics Concern     Parent/sibling w/ CABG, MI or angioplasty before 65F 55M? Not Asked   Social History Narrative    Attended 1 year of college. Worked as  as and manager at a [employer]. Retired when moved to [facility], at age 53. 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 Met  Shaun at [facility]. Shaun is a retired  and is involved in community service.       General Physical examination:  /64   Resp 16   Wt 206 lb 6.4 oz (93.6 kg)   BMI 32.33 kg/m       General: Ms. Craig is well appearing and is appropriately  groomed and dressed.  Chest: Clear to auscultation bilaterally.  Heart: Regular rhythm with no murmurs, rubs, or gallops.  Ext: warm, no edema  Skin: clean, dry, intact    Neurological examination:  Mental status: Ms. Craig  is awake, alert, and appropriate, with fluent speech, no word finding difficulties and no difficulty in answering questions. She recalls that she has 5 children, and that her son Ellis lives in [place]. No apraxia is noted.  Cranial nerves: Visual fields are full to confrontation with no visual extinction. Extraocular movements are intact. Smooth pursuit is intact. Saccades are normal in initiation, velocity and amplitude both vertically and horizontally. Facial sensation is intact to light touch. Facial strength is full bilaterally. She is hard of hearing but able to understand. The tongue protrudes in the midline with intact strength. There are no tongue fasciculations noted. The soft palate elevates symmetrically. Sternocledomastoid and trapezius muscle strength is full bilaterally.  Motor examination: Bulk is normal throughout. Axial and upper and lower extremity tone is normal. No pronator drift is noted. Strength is 5/5 and symmetric throughout. No fasciculations are noted. There is no tremor or other involuntary movement.  Reflexes: Reflexes are symmetric and 2+ at biceps, triceps, and brachioradialis. Patellar reflexes are 2+ bilaterally. Achilles reflexes are not obtainable bilaterally.   Gait: She has an upright and steady stance with normal stride length and no arm swing. There is no retropulsion.    Brief neuropsychological evaluation:  On global testing, using the MMSE, Ms. Craig scores 18/30, with points deducted for orientation (-9) and recall (-3).    Labs:  Office Visit on 12/06/2017   Component Date Value Ref Range Status     Specimen Description 12/06/2017 Vagina   Final     Wet Prep 12/06/2017 No yeast seen   Final     Wet Prep 12/06/2017 No Trichomonas seen   Final      Wet Prep 12/06/2017 No clue cells seen   Final   Office Visit on 11/22/2017   Component Date Value Ref Range Status     Color Urine 11/22/2017 Yellow   Final     Appearance Urine 11/22/2017 Clear   Final     Glucose Urine 11/22/2017 Negative  NEG^Negative mg/dL Final     Bilirubin Urine 11/22/2017 Negative  NEG^Negative Final     Ketones Urine 11/22/2017 Negative  NEG^Negative mg/dL Final     Specific Gravity Urine 11/22/2017 1.015  1.003 - 1.035 Final     Blood Urine 11/22/2017 Negative  NEG^Negative Final     pH Urine 11/22/2017 7.5* 5.0 - 7.0 pH Final     Protein Albumin Urine 11/22/2017 Negative  NEG^Negative mg/dL Final     Urobilinogen Urine 11/22/2017 1.0  0.2 - 1.0 EU/dL Final     Nitrite Urine 11/22/2017 Negative  NEG^Negative Final     Leukocyte Esterase Urine 11/22/2017 Negative  NEG^Negative Final     Source 11/22/2017 Midstream Urine   Final   Office Visit on 10/18/2017   Component Date Value Ref Range Status     Color Urine 10/18/2017 Yellow   Final     Appearance Urine 10/18/2017 Clear   Final     Glucose Urine 10/18/2017 Negative  NEG^Negative mg/dL Final     Bilirubin Urine 10/18/2017 Negative  NEG^Negative Final     Ketones Urine 10/18/2017 Negative  NEG^Negative mg/dL Final     Specific Gravity Urine 10/18/2017 1.015  1.003 - 1.035 Final     Blood Urine 10/18/2017 Negative  NEG^Negative Final     pH Urine 10/18/2017 7.0  5.0 - 7.0 pH Final     Protein Albumin Urine 10/18/2017 Trace* NEG^Negative mg/dL Final     Urobilinogen Urine 10/18/2017 2.0* 0.2 - 1.0 EU/dL Final     Nitrite Urine 10/18/2017 Positive* NEG^Negative Final     Leukocyte Esterase Urine 10/18/2017 Small* NEG^Negative Final     Source 10/18/2017 Midstream Urine   Final     Specimen Description 10/18/2017 Midstream Urine   Final     Special Requests 10/18/2017 Specimen received in preservative   Final     Culture Micro 10/18/2017 *  Final                    Value:>100,000 colonies/mL  Escherichia coli       Culture Micro 10/18/2017     Final                    Value:<10,000 colonies/mL  mixed urogenital lina  Susceptibility testing not routinely done       Specimen Description 10/18/2017 Midstream Urine   Final     Special Requests 10/18/2017 Specimen received in [employer]   Final     Culture Micro 10/18/2017 Ureaplasma species isolated*  Final     Specimen Description 10/18/2017 Midstream Urine   Final     Special Requests 10/18/2017 Specimen received in [employer]   Final     Culture Micro 10/18/2017 No large colony Mycoplasma species isolated   Final   Admission on 03/30/2016, Discharged on 04/02/2016   Component Date Value Ref Range Status     Creatinine 03/30/2016 0.93  0.52 - 1.04 mg/dL Final     GFR Estimate 03/30/2016 57* >60 mL/min/1.7m2 Final     GFR Estimate If Black 03/30/2016 70  >60 mL/min/1.7m2 Final     Potassium 03/30/2016 4.3  3.4 - 5.3 mmol/L Final     Sodium 04/01/2016 137  133 - 144 mmol/L Final     Potassium 04/01/2016 4.1  3.4 - 5.3 mmol/L Final     Chloride 04/01/2016 104  94 - 109 mmol/L Final     Carbon Dioxide 04/01/2016 29  20 - 32 mmol/L Final     Anion Gap 04/01/2016 4  3 - 14 mmol/L Final     Glucose 04/01/2016 122* 70 - 99 mg/dL Final     Urea Nitrogen 04/01/2016 19  7 - 30 mg/dL Final     Creatinine 04/01/2016 0.80  0.52 - 1.04 mg/dL Final     GFR Estimate 04/01/2016 69  >60 mL/min/1.7m2 Final     GFR Estimate If Black 04/01/2016 83  >60 mL/min/1.7m2 Final     Calcium 04/01/2016 8.6  8.5 - 10.1 mg/dL Final     WBC 04/01/2016 8.8  4.0 - 11.0 10e9/L Final     RBC Count 04/01/2016 3.85  3.8 - 5.2 10e12/L Final     Hemoglobin 04/01/2016 11.7  11.7 - 15.7 g/dL Final     Hematocrit 04/01/2016 35.1  35.0 - 47.0 % Final     MCV 04/01/2016 91  78 - 100 fl Final     MCH 04/01/2016 30.4  26.5 - 33.0 pg Final     MCHC 04/01/2016 33.3  31.5 - 36.5 g/dL Final     RDW 04/01/2016 13.5  10.0 - 15.0 % Final     Platelet Count 04/01/2016 230  150 - 450 10e9/L Final     Diff Method 04/01/2016 Automated  Method   Final     % Neutrophils 04/01/2016 76.6  % Final     % Lymphocytes 04/01/2016 12.6  % Final     % Monocytes 04/01/2016 7.5  % Final     % Eosinophils 04/01/2016 2.6  % Final     % Basophils 04/01/2016 0.1  % Final     % Immature Granulocytes 04/01/2016 0.6  % Final     Nucleated RBCs 04/01/2016 0  0 /100 Final     Absolute Neutrophil 04/01/2016 6.8  1.6 - 8.3 10e9/L Final     Absolute Lymphocytes 04/01/2016 1.1  0.8 - 5.3 10e9/L Final     Absolute Monocytes 04/01/2016 0.7  0.0 - 1.3 10e9/L Final     Absolute Eosinophils 04/01/2016 0.2  0.0 - 0.7 10e9/L Final     Absolute Basophils 04/01/2016 0.0  0.0 - 0.2 10e9/L Final     Abs Immature Granulocytes 04/01/2016 0.1  0 - 0.4 10e9/L Final     Absolute Nucleated RBC 04/01/2016 0.0   Final     Interpretation ECG 04/01/2016 Click View Image link to view waveform and result   Final     Troponin I ES 04/01/2016   0.000 - 0.045 ug/L Final                    Value:<0.015  The 99th percentile for upper reference range is 0.045 ug/L.  Troponin values in   the range of 0.045 - 0.120 ug/L may be associated with risks of adverse   clinical events.       D Dimer 04/01/2016 4.7* 0.0 - 0.50 ug/ml FEU Final     Interpretation ECG 04/01/2016 Click View Image link to view waveform and result   Final     Normal TSH and B12 levels documented in 2016.    Imaging:  MRI brain 5/2016 shows mild generalized cortical atrophy and mild subcortical and periventricular T2/FLAIR hyperintensities.     Impression:  Ms. Craig is a 84 year old left-handed female who has been [facility] in [facility] for progressive memory loss. Her MMSE in Feb 2018 was 17/30, MoCA score in Aug 2018 was 11/30, and MMSE today is 18/20. She is in the moderate stages of Alzheimer's disease and tolerating full dose donepezil and memantine. BP is under better control.     Recommendations:    Continue donepezil and memantine.     Check BP periodically at home    Continue staying socially and physically  active    Monitor mood.     Return for follow up evaluation in 6 months    I spent a total of 40 minutes face-to-face with the patient, and over half of that time was spent counseling regarding the symptoms, diagnosis, medication risks, lifestyle modification, therapeutic options, and prognosis.

## 2019-03-08 NOTE — PATIENT INSTRUCTIONS
You saw saw Dr. Natalio Gray at the [facility] for an evaluation of memory loss, likely due to Alzheimer's disease, moderate stage.  Mini Mental State Exam score was 18/30 today, and was 17/30 a year ago. Your blood pressure was improved today    Recommendations:    Continue donepezil and memantine. Take donepezil in the mornings.     Check BP periodically    Continue staying socially and physically active    Monitor mood     Return for follow up evaluation in 6 months    Research opportunities:  1. [facility] Caregiver Registry (forms available in our waiting room)  2. Alzheimer's Association TrialMatch:  http://www.alz.org/research/clinical_trials/find_clinical_trials_trialmatch.asp  3. ClinicalTrials.gov

## 2019-09-03 DIAGNOSIS — F02.80 LATE ONSET ALZHEIMER'S DISEASE WITHOUT BEHAVIORAL DISTURBANCE (H): Primary | ICD-10-CM

## 2019-09-03 DIAGNOSIS — G30.1 LATE ONSET ALZHEIMER'S DISEASE WITHOUT BEHAVIORAL DISTURBANCE (H): Primary | ICD-10-CM

## 2019-09-03 RX ORDER — MEMANTINE HYDROCHLORIDE 10 MG/1
10 TABLET ORAL 2 TIMES DAILY
Qty: 90 TABLET | Refills: 3 | Status: SHIPPED | OUTPATIENT
Start: 2019-09-03 | End: 2020-01-01

## 2019-09-06 ENCOUNTER — OFFICE VISIT (OUTPATIENT)
Dept: NEUROLOGY | Facility: CLINIC | Age: 84
End: 2019-09-06
Payer: MEDICARE

## 2019-09-06 VITALS
WEIGHT: 203.6 LBS | HEART RATE: 54 BPM | DIASTOLIC BLOOD PRESSURE: 61 MMHG | SYSTOLIC BLOOD PRESSURE: 141 MMHG | BODY MASS INDEX: 31.89 KG/M2

## 2019-09-06 DIAGNOSIS — G30.1 LATE ONSET ALZHEIMER'S DISEASE WITHOUT BEHAVIORAL DISTURBANCE (H): Primary | ICD-10-CM

## 2019-09-06 DIAGNOSIS — F02.80 LATE ONSET ALZHEIMER'S DISEASE WITHOUT BEHAVIORAL DISTURBANCE (H): Primary | ICD-10-CM

## 2019-09-06 ASSESSMENT — PAIN SCALES - GENERAL: PAINLEVEL: NO PAIN (0)

## 2019-09-06 NOTE — PROGRESS NOTES
Chief Complaint: Follow up    History of Present Illness:  I had the pleasure of seeing Ms. Craig in follow up consultation for her symptoms of memory loss. She is accompanied to today's visit by her , Shaun, who assists in providing the history.     Ms. Craig is an 85 year-old left-handed woman who [facility] loss. Evaluations have been consistent with diagnosis of Alzheimer's disease. She is currently on donepezil and memantine.     She was last seen in March 2018. In the interim, there have been ups and downs, but no major change. She occasionally gets impatient when waiting on lunch, but is generally in good spirits. Her level of function and care needs are unchanged.  Shaun oversees her medications, and she may miss an occasional dose.     Ms. Craig sees her daughters on weekends. She is reading the Bible and the news. She sleeps well at night and takes long naps in the daytime.     She is swallowing well and has had no changes in motor function and no weight loss.     Shaun has not been checking her blood pressure at home.     Medical review of systems:  The comprehensive review of systems is otherwise reviewed and negative.     Patient Active Problem List   Diagnosis     Post-op pain     ACP (advance care planning)     MCI (mild cognitive impairment)       Past Medical History:   Diagnosis Date     Arthritis      Complication of anesthesia      H/O transfusion     but not entire;y sure     Hypertension      Mumps      She had normal childhood development and reports no major head injuries.     Past Surgical History:   Procedure Laterality Date     BLADDER SURGERY       BUNIONECTOMY RT/LT       C OPEN RX ANKLE DISLOCATN+FIXATN      right     COLONOSCOPY       COLONOSCOPY  3/13/2012    Procedure:COLONOSCOPY; COLONOSCOPY ; Surgeon:LUCAS MOODY; Location: GI     GYN SURGERY      hysterectomy     HC REMOVAL OF TONSILS,<11 Y/O       OPEN  REDUCTION INTERNAL FIXATION ANKLE Right 3/30/2016    Procedure: OPEN REDUCTION INTERNAL FIXATION ANKLE;  Surgeon: Denis Gaffney MD;  Location: [facility] OR       Current Outpatient Medications   Medication Sig Dispense Refill     aspirin 325 MG tablet Take 81 mg by mouth daily        Calcium Carbonate-Vitamin D (CALCIUM 600+D PO) Take 2 tablets by mouth daily        diltiazem (CARDIZEM CD; CARTIA XT) 360 MG 24 hr CD capsule Take 180 mg by mouth daily        donepezil (ARICEPT) 10 MG tablet TAKE 1 TABLET (10 MG) BY MOUTH AT BEDTIME (Patient taking differently: TAKE 1 TABLET (10 MG) BY MOUTH IN THE MORNING) 30 tablet 0     LISINOPRIL PO Take 20 mg by mouth daily       memantine (NAMENDA) 10 MG tablet Take 1 tablet (10 mg) by mouth 2 times daily Use as instructed 90 tablet 3     Multiple Vitamins-Minerals (SENIOR MULTIVITAMIN PLUS) TABS Take 1 tablet by mouth daily        simvastatin (ZOCOR) 20 MG tablet Take 20 mg by mouth At Bedtime.       VITAMIN D PO Take 50,000 Units by mouth every 30 days        VITAMIN D, CHOLECALCIFEROL, PO Take 2,000 Units by mouth daily          Allergies   Allergen Reactions     Sulfa Drugs      hives       Family History   Problem Relation Age of Onset     Other - See Comments Mother          at age 84 with cardiac disease      Myocardial Infarction Father          at age 70   Her mother had stroke at age 80  Her father had MI at age 70,  age 70  Brother Antonio passed away, age 62  5 children  There is no reported family history of neurodegenerative disease.     Social History     Socioeconomic History     Marital status:      Spouse name: Not on file     Number of children: Not on file     Years of education: Not on file     Highest education level: Not on file   Occupational History     Not on file   Social Needs     Financial resource strain: Not on file     Food insecurity:     Worry: Not on file     Inability: Not on file     Transportation needs:     Medical:  Not on file     Non-medical: Not on file   Tobacco Use     Smoking status: Never Smoker     Smokeless tobacco: Never Used   Substance and Sexual Activity     Alcohol use: Yes     Alcohol/week: 0.0 oz     Comment: one drink per week     Drug use: No     Sexual activity: Never   Lifestyle     Physical activity:     Days per week: Not on file     Minutes per session: Not on file     Stress: Not on file   Relationships     Social connections:     Talks on phone: Not on file     Gets together: Not on file     Attends [employer] service: Not on file     Active member of club or organization: Not on file     Attends meetings of clubs or organizations: Not on file     Relationship status: Not on file     Intimate partner violence:     Fear of current or ex partner: Not on file     Emotionally abused: Not on file     Physically abused: Not on file     Forced sexual activity: Not on file   Other Topics Concern     Parent/sibling w/ CABG, MI or angioplasty before 65F 55M? Not Asked   Social History Narrative    Attended 1 year of college. 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 Retired when moved to [facility], at age 53. 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 Met  Shaun at [facility]. Shaun is a retired  and is involved in community service.       General Physical examination:  BP (!) 141/61   Pulse 54   Wt 203 lb 9.6 oz (92.4 kg)   Breastfeeding? No   BMI 31.89 kg/m       General: Ms. Craig is well appearing and is appropriately groomed and dressed.  Chest: Clear to auscultation bilaterally.  Heart: Regular rhythm with no murmurs, rubs, or gallops.  Ext: warm, no edema  Skin: clean, dry, intact    Neurological examination:  Mental status: Ms. Craig  is awake, alert, and appropriate, with fluent speech, no word finding difficulties and no difficulty in answering questions.  She is able to name the month but not the year. She is able to recall her birth date with  some prompting for the year. She says she is 72 years old. When asked the president's name she says Derrell, which is consistent with the year that she reports of 1952. No apraxia is noted.  Cranial nerves: Extraocular movements are intact. Smooth pursuit is intact. Saccades are normal in initiation, velocity and amplitude both vertically and horizontally. Facial sensation is intact to light touch. Facial strength is full bilaterally. She is hard of hearing but able to understand.   Motor examination: Bulk is normal throughout. Axial and upper and lower extremity tone is normal. No pronator drift is noted. Strength is 5/5 and symmetric throughout. No fasciculations are noted. There is no tremor or other involuntary movement.  Reflexes: Reflexes are symmetric and 2+ at biceps, triceps, and brachioradialis. Patellar reflexes are 2+ bilaterally. Achilles reflexes are not obtainable bilaterally.   Gait: She has an upright and steady stance with normal stride length and no arm swing. There is no retropulsion.    Labs:  Office Visit on 12/06/2017   Component Date Value Ref Range Status     Specimen Description 12/06/2017 Vagina   Final     Wet Prep 12/06/2017 No yeast seen   Final     Wet Prep 12/06/2017 No Trichomonas seen   Final     Wet Prep 12/06/2017 No clue cells seen   Final   Office Visit on 11/22/2017   Component Date Value Ref Range Status     Color Urine 11/22/2017 Yellow   Final     Appearance Urine 11/22/2017 Clear   Final     Glucose Urine 11/22/2017 Negative  NEG^Negative mg/dL Final     Bilirubin Urine 11/22/2017 Negative  NEG^Negative Final     Ketones Urine 11/22/2017 Negative  NEG^Negative mg/dL Final     Specific Gravity Urine 11/22/2017 1.015  1.003 - 1.035 Final     Blood Urine 11/22/2017 Negative  NEG^Negative Final     pH Urine 11/22/2017 7.5* 5.0 - 7.0 pH Final     Protein Albumin Urine 11/22/2017 Negative  NEG^Negative mg/dL Final     Urobilinogen Urine 11/22/2017 1.0  0.2 - 1.0 EU/dL Final      Nitrite Urine 11/22/2017 Negative  NEG^Negative Final     Leukocyte Esterase Urine 11/22/2017 Negative  NEG^Negative Final     Source 11/22/2017 Midstream Urine   Final   Office Visit on 10/18/2017   Component Date Value Ref Range Status     Color Urine 10/18/2017 Yellow   Final     Appearance Urine 10/18/2017 Clear   Final     Glucose Urine 10/18/2017 Negative  NEG^Negative mg/dL Final     Bilirubin Urine 10/18/2017 Negative  NEG^Negative Final     Ketones Urine 10/18/2017 Negative  NEG^Negative mg/dL Final     Specific Gravity Urine 10/18/2017 1.015  1.003 - 1.035 Final     Blood Urine 10/18/2017 Negative  NEG^Negative Final     pH Urine 10/18/2017 7.0  5.0 - 7.0 pH Final     Protein Albumin Urine 10/18/2017 Trace* NEG^Negative mg/dL Final     Urobilinogen Urine 10/18/2017 2.0* 0.2 - 1.0 EU/dL Final     Nitrite Urine 10/18/2017 Positive* NEG^Negative Final     Leukocyte Esterase Urine 10/18/2017 Small* NEG^Negative Final     Source 10/18/2017 Midstream Urine   Final     Specimen Description 10/18/2017 Midstream Urine   Final     Special Requests 10/18/2017 Specimen received in preservative   Final     Culture Micro 10/18/2017 *  Final                    Value:>100,000 colonies/mL  Escherichia coli       Culture Micro 10/18/2017    Final                    Value:<10,000 colonies/mL  mixed urogenital lina  Susceptibility testing not routinely done       Specimen Description 10/18/2017 Midstream Urine   Final     Special Requests 10/18/2017 Specimen received in [place] [employer]   Final     Culture Micro 10/18/2017 Ureaplasma species isolated*  Final     Specimen Description 10/18/2017 Midstream Urine   Final     Special Requests 10/18/2017 Specimen received in [place] [employer]   Final     Culture Micro 10/18/2017 No large colony Mycoplasma species isolated   Final   Admission on 03/30/2016, Discharged on 04/02/2016   Component Date Value Ref Range Status     Creatinine 03/30/2016 0.93  0.52 - 1.04  mg/dL Final     GFR Estimate 03/30/2016 57* >60 mL/min/1.7m2 Final     GFR Estimate If Black 03/30/2016 70  >60 mL/min/1.7m2 Final     Potassium 03/30/2016 4.3  3.4 - 5.3 mmol/L Final     Sodium 04/01/2016 137  133 - 144 mmol/L Final     Potassium 04/01/2016 4.1  3.4 - 5.3 mmol/L Final     Chloride 04/01/2016 104  94 - 109 mmol/L Final     Carbon Dioxide 04/01/2016 29  20 - 32 mmol/L Final     Anion Gap 04/01/2016 4  3 - 14 mmol/L Final     Glucose 04/01/2016 122* 70 - 99 mg/dL Final     Urea Nitrogen 04/01/2016 19  7 - 30 mg/dL Final     Creatinine 04/01/2016 0.80  0.52 - 1.04 mg/dL Final     GFR Estimate 04/01/2016 69  >60 mL/min/1.7m2 Final     GFR Estimate If Black 04/01/2016 83  >60 mL/min/1.7m2 Final     Calcium 04/01/2016 8.6  8.5 - 10.1 mg/dL Final     WBC 04/01/2016 8.8  4.0 - 11.0 10e9/L Final     RBC Count 04/01/2016 3.85  3.8 - 5.2 10e12/L Final     Hemoglobin 04/01/2016 11.7  11.7 - 15.7 g/dL Final     Hematocrit 04/01/2016 35.1  35.0 - 47.0 % Final     MCV 04/01/2016 91  78 - 100 fl Final     MCH 04/01/2016 30.4  26.5 - 33.0 pg Final     MCHC 04/01/2016 33.3  31.5 - 36.5 g/dL Final     RDW 04/01/2016 13.5  10.0 - 15.0 % Final     Platelet Count 04/01/2016 230  150 - 450 10e9/L Final     Diff Method 04/01/2016 Automated Method   Final     % Neutrophils 04/01/2016 76.6  % Final     % Lymphocytes 04/01/2016 12.6  % Final     % Monocytes 04/01/2016 7.5  % Final     % Eosinophils 04/01/2016 2.6  % Final     % Basophils 04/01/2016 0.1  % Final     % Immature Granulocytes 04/01/2016 0.6  % Final     Nucleated RBCs 04/01/2016 0  0 /100 Final     Absolute Neutrophil 04/01/2016 6.8  1.6 - 8.3 10e9/L Final     Absolute Lymphocytes 04/01/2016 1.1  0.8 - 5.3 10e9/L Final     Absolute Monocytes 04/01/2016 0.7  0.0 - 1.3 10e9/L Final     Absolute Eosinophils 04/01/2016 0.2  0.0 - 0.7 10e9/L Final     Absolute Basophils 04/01/2016 0.0  0.0 - 0.2 10e9/L Final     Abs Immature Granulocytes 04/01/2016 0.1  0 - 0.4 10e9/L  Final     Absolute Nucleated RBC 04/01/2016 0.0   Final     Interpretation ECG 04/01/2016 Click View Image link to view waveform and result   Final     Troponin I ES 04/01/2016   0.000 - 0.045 ug/L Final                    Value:<0.015  The 99th percentile for upper reference range is 0.045 ug/L.  Troponin values in   the range of 0.045 - 0.120 ug/L may be associated with risks of adverse   clinical events.       D Dimer 04/01/2016 4.7* 0.0 - 0.50 ug/ml FEU Final     Interpretation ECG 04/01/2016 Click View Image link to view waveform and result   Final     Normal TSH and B12 levels documented in 2016.    Imaging:  MRI brain 5/2016 shows mild generalized cortical atrophy and mild subcortical and periventricular T2/FLAIR hyperintensities.     Impression:  Ms. Craig is a 85 year old left-handed female who [facility] loss. Her functional status and cognitive abilities have been fairly stable over the past year. She is in the moderate stages of Alzheimer's disease and tolerating full dose donepezil and memantine. She has good care at home and Shaun is in touch with the [employer] about local resources.     Recommendations:    Continue donepezil and memantine.     Check BP periodically    Continue staying socially and physically active    Monitor mood     Limit naps to 30 min or less    Return for follow up evaluation in 6-8 months    I spent a total of 40 minutes face-to-face with the patient, and over half of that time was spent counseling regarding the symptoms, diagnosis, medication risks, lifestyle modification, therapeutic options, and prognosis.

## 2019-09-06 NOTE — LETTER
9/6/2019     RE: Chris Craig  [address]  [facility] [address]     Dear Colleague,    Thank you for referring your patient, Chris Craig, to the [facility] at [facility]. Please see a copy of my visit note below.    Chief Complaint: Follow up    History of Present Illness:  I had the pleasure of seeing Ms. Craig in follow up consultation for her symptoms of memory loss. She is accompanied to today's visit by her , Shaun, who assists in providing the history.     Ms. Craig is an 85 year-old left-handed woman who has been followed in [facility] for progressive memory loss. Evaluations have been consistent with diagnosis of Alzheimer's disease. She is currently on donepezil and memantine.     She was last seen in March 2018. In the interim, there have been ups and downs, but no major change. She occasionally gets impatient when waiting on lunch, but is generally in good spirits. Her level of function and care needs are unchanged.  Shaun oversees her medications, and she may miss an occasional dose.     Ms. Craig sees her daughters on weekends. She is reading the Bible and the news. She sleeps well at night and takes long naps in the daytime.     She is swallowing well and has had no changes in motor function and no weight loss.     Shaun has not been checking her blood pressure at home.     Medical review of systems:  The comprehensive review of systems is otherwise reviewed and negative.     Patient Active Problem List   Diagnosis     Post-op pain     ACP (advance care planning)     MCI (mild cognitive impairment)       Past Medical History:   Diagnosis Date     Arthritis      Complication of anesthesia      H/O transfusion     but not entire;y sure     Hypertension      Mumps      She had normal childhood development and reports no major head injuries.     Past Surgical History:   Procedure Laterality Date     BLADDER SURGERY        BUNIONECTOMY RT/LT       C OPEN RX ANKLE DISLOCATN+FIXATN      right     COLONOSCOPY       COLONOSCOPY  3/13/2012    Procedure:COLONOSCOPY; COLONOSCOPY ; Surgeon:LUCAS MOODY; Location: GI     GYN SURGERY      hysterectomy     HC REMOVAL OF TONSILS,<11 Y/O       OPEN REDUCTION INTERNAL FIXATION ANKLE Right 3/30/2016    Procedure: OPEN REDUCTION INTERNAL FIXATION ANKLE;  Surgeon: Denis Gaffney MD;  Location:  OR       Current Outpatient Medications   Medication Sig Dispense Refill     aspirin 325 MG tablet Take 81 mg by mouth daily        Calcium Carbonate-Vitamin D (CALCIUM 600+D PO) Take 2 tablets by mouth daily        diltiazem (CARDIZEM CD; CARTIA XT) 360 MG 24 hr CD capsule Take 180 mg by mouth daily        donepezil (ARICEPT) 10 MG tablet TAKE 1 TABLET (10 MG) BY MOUTH AT BEDTIME (Patient taking differently: TAKE 1 TABLET (10 MG) BY MOUTH IN THE MORNING) 30 tablet 0     LISINOPRIL PO Take 20 mg by mouth daily       memantine (NAMENDA) 10 MG tablet Take 1 tablet (10 mg) by mouth 2 times daily Use as instructed 90 tablet 3     Multiple Vitamins-Minerals (SENIOR MULTIVITAMIN PLUS) TABS Take 1 tablet by mouth daily        simvastatin (ZOCOR) 20 MG tablet Take 20 mg by mouth At Bedtime.       VITAMIN D PO Take 50,000 Units by mouth every 30 days        VITAMIN D, CHOLECALCIFEROL, PO Take 2,000 Units by mouth daily          Allergies   Allergen Reactions     Sulfa Drugs      hives       Family History   Problem Relation Age of Onset     Other - See Comments Mother          at age 84 with cardiac disease      Myocardial Infarction Father          at age 70   Her mother had stroke at age 80  Her father had MI at age 70,  age 70  Brother Antonio passed away, age 62  5 children  There is no reported family history of neurodegenerative disease.     Social History     Socioeconomic History     Marital status:      Spouse name: Not on file     Number of children: Not on file      Years of education: Not on file     Highest education level: Not on file   Occupational History     Not on file   Social Needs     Financial resource strain: Not on file     Food insecurity:     Worry: Not on file     Inability: Not on file     Transportation needs:     Medical: Not on file     Non-medical: Not on file   Tobacco Use     Smoking status: Never Smoker     Smokeless tobacco: Never Used   Substance and Sexual Activity     Alcohol use: Yes     Alcohol/week: 0.0 oz     Comment: one drink per week     Drug use: No     Sexual activity: Never   Lifestyle     Physical activity:     Days per week: Not on file     Minutes per session: Not on file     Stress: Not on file   Relationships     Social connections:     Talks on phone: Not on file     Gets together: Not on file     Attends Druze service: Not on file     Active member of club or organization: Not on file     Attends meetings of clubs or organizations: Not on file     Relationship status: Not on file     Intimate partner violence:     Fear of current or ex partner: Not on file     Emotionally abused: Not on file     Physically abused: Not on file     Forced sexual activity: Not on file   Other Topics Concern     Parent/sibling w/ CABG, MI or angioplasty before 65F 55M? Not Asked   Social History Narrative    Attended 1 year of college. Worked as  as and manager at a [employer]. Retired when moved to [facility], at age 53. 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 Met  Shaun at [facility]. Shaun is a retired  and is involved in community service.       General Physical examination:  BP (!) 141/61   Pulse 54   Wt 203 lb 9.6 oz (92.4 kg)   Breastfeeding? No   BMI 31.89 kg/m        General: Ms. Craig is well appearing and is appropriately groomed and dressed.  Chest: Clear to auscultation bilaterally.  Heart: Regular rhythm with no murmurs, rubs, or gallops.  Ext: warm, no edema  Skin: clean, dry,  intact    Neurological examination:  Mental status: Ms. Criag  is awake, alert, and appropriate, with fluent speech, no word finding difficulties and no difficulty in answering questions.  She is able to name the month but not the year. She is able to recall her birth date with some prompting for the year. She says she is 72 years old. When asked the president's name she says Derrell, which is consistent with the year that she reports of 1952. No apraxia is noted.  Cranial nerves: Extraocular movements are intact. Smooth pursuit is intact. Saccades are normal in initiation, velocity and amplitude both vertically and horizontally. Facial sensation is intact to light touch. Facial strength is full bilaterally. She is hard of hearing but able to understand.   Motor examination: Bulk is normal throughout. Axial and upper and lower extremity tone is normal. No pronator drift is noted. Strength is 5/5 and symmetric throughout. No fasciculations are noted. There is no tremor or other involuntary movement.  Reflexes: Reflexes are symmetric and 2+ at biceps, triceps, and brachioradialis. Patellar reflexes are 2+ bilaterally. Achilles reflexes are not obtainable bilaterally.   Gait: She has an upright and steady stance with normal stride length and no arm swing. There is no retropulsion.    Labs:  Office Visit on 12/06/2017   Component Date Value Ref Range Status     Specimen Description 12/06/2017 Vagina   Final     Wet Prep 12/06/2017 No yeast seen   Final     Wet Prep 12/06/2017 No Trichomonas seen   Final     Wet Prep 12/06/2017 No clue cells seen   Final   Office Visit on 11/22/2017   Component Date Value Ref Range Status     Color Urine 11/22/2017 Yellow   Final     Appearance Urine 11/22/2017 Clear   Final     Glucose Urine 11/22/2017 Negative  NEG^Negative mg/dL Final     Bilirubin Urine 11/22/2017 Negative  NEG^Negative Final     Ketones Urine 11/22/2017 Negative  NEG^Negative mg/dL Final     Specific Gravity Urine  11/22/2017 1.015  1.003 - 1.035 Final     Blood Urine 11/22/2017 Negative  NEG^Negative Final     pH Urine 11/22/2017 7.5* 5.0 - 7.0 pH Final     Protein Albumin Urine 11/22/2017 Negative  NEG^Negative mg/dL Final     Urobilinogen Urine 11/22/2017 1.0  0.2 - 1.0 EU/dL Final     Nitrite Urine 11/22/2017 Negative  NEG^Negative Final     Leukocyte Esterase Urine 11/22/2017 Negative  NEG^Negative Final     Source 11/22/2017 Midstream Urine   Final   Office Visit on 10/18/2017   Component Date Value Ref Range Status     Color Urine 10/18/2017 Yellow   Final     Appearance Urine 10/18/2017 Clear   Final     Glucose Urine 10/18/2017 Negative  NEG^Negative mg/dL Final     Bilirubin Urine 10/18/2017 Negative  NEG^Negative Final     Ketones Urine 10/18/2017 Negative  NEG^Negative mg/dL Final     Specific Gravity Urine 10/18/2017 1.015  1.003 - 1.035 Final     Blood Urine 10/18/2017 Negative  NEG^Negative Final     pH Urine 10/18/2017 7.0  5.0 - 7.0 pH Final     Protein Albumin Urine 10/18/2017 Trace* NEG^Negative mg/dL Final     Urobilinogen Urine 10/18/2017 2.0* 0.2 - 1.0 EU/dL Final     Nitrite Urine 10/18/2017 Positive* NEG^Negative Final     Leukocyte Esterase Urine 10/18/2017 Small* NEG^Negative Final     Source 10/18/2017 Midstream Urine   Final     Specimen Description 10/18/2017 Midstream Urine   Final     Special Requests 10/18/2017 Specimen received in preservative   Final     Culture Micro 10/18/2017 *  Final                    Value:>100,000 colonies/mL  Escherichia coli       Culture Micro 10/18/2017    Final                    Value:<10,000 colonies/mL  mixed urogenital lina  Susceptibility testing not routinely done       Specimen Description 10/18/2017 Midstream Urine   Final     Special Requests 10/18/2017 Specimen received in [place] [employer]   Final     Culture Micro 10/18/2017 Ureaplasma species isolated*  Final     Specimen Description 10/18/2017 Midstream Urine   Final     Special Requests  10/18/2017 Specimen received in [place] [employer]   Final     Culture Micro 10/18/2017 No large colony Mycoplasma species isolated   Final   Admission on 03/30/2016, Discharged on 04/02/2016   Component Date Value Ref Range Status     Creatinine 03/30/2016 0.93  0.52 - 1.04 mg/dL Final     GFR Estimate 03/30/2016 57* >60 mL/min/1.7m2 Final     GFR Estimate If Black 03/30/2016 70  >60 mL/min/1.7m2 Final     Potassium 03/30/2016 4.3  3.4 - 5.3 mmol/L Final     Sodium 04/01/2016 137  133 - 144 mmol/L Final     Potassium 04/01/2016 4.1  3.4 - 5.3 mmol/L Final     Chloride 04/01/2016 104  94 - 109 mmol/L Final     Carbon Dioxide 04/01/2016 29  20 - 32 mmol/L Final     Anion Gap 04/01/2016 4  3 - 14 mmol/L Final     Glucose 04/01/2016 122* 70 - 99 mg/dL Final     Urea Nitrogen 04/01/2016 19  7 - 30 mg/dL Final     Creatinine 04/01/2016 0.80  0.52 - 1.04 mg/dL Final     GFR Estimate 04/01/2016 69  >60 mL/min/1.7m2 Final     GFR Estimate If Black 04/01/2016 83  >60 mL/min/1.7m2 Final     Calcium 04/01/2016 8.6  8.5 - 10.1 mg/dL Final     WBC 04/01/2016 8.8  4.0 - 11.0 10e9/L Final     RBC Count 04/01/2016 3.85  3.8 - 5.2 10e12/L Final     Hemoglobin 04/01/2016 11.7  11.7 - 15.7 g/dL Final     Hematocrit 04/01/2016 35.1  35.0 - 47.0 % Final     MCV 04/01/2016 91  78 - 100 fl Final     MCH 04/01/2016 30.4  26.5 - 33.0 pg Final     MCHC 04/01/2016 33.3  31.5 - 36.5 g/dL Final     RDW 04/01/2016 13.5  10.0 - 15.0 % Final     Platelet Count 04/01/2016 230  150 - 450 10e9/L Final     Diff Method 04/01/2016 Automated Method   Final     % Neutrophils 04/01/2016 76.6  % Final     % Lymphocytes 04/01/2016 12.6  % Final     % Monocytes 04/01/2016 7.5  % Final     % Eosinophils 04/01/2016 2.6  % Final     % Basophils 04/01/2016 0.1  % Final     % Immature Granulocytes 04/01/2016 0.6  % Final     Nucleated RBCs 04/01/2016 0  0 /100 Final     Absolute Neutrophil 04/01/2016 6.8  1.6 - 8.3 10e9/L Final     Absolute Lymphocytes  04/01/2016 1.1  0.8 - 5.3 10e9/L Final     Absolute Monocytes 04/01/2016 0.7  0.0 - 1.3 10e9/L Final     Absolute Eosinophils 04/01/2016 0.2  0.0 - 0.7 10e9/L Final     Absolute Basophils 04/01/2016 0.0  0.0 - 0.2 10e9/L Final     Abs Immature Granulocytes 04/01/2016 0.1  0 - 0.4 10e9/L Final     Absolute Nucleated RBC 04/01/2016 0.0   Final     Interpretation ECG 04/01/2016 Click View Image link to view waveform and result   Final     Troponin I ES 04/01/2016   0.000 - 0.045 ug/L Final                    Value:<0.015  The 99th percentile for upper reference range is 0.045 ug/L.  Troponin values in   the range of 0.045 - 0.120 ug/L may be associated with risks of adverse   clinical events.       D Dimer 04/01/2016 4.7* 0.0 - 0.50 ug/ml FEU Final     Interpretation ECG 04/01/2016 Click View Image link to view waveform and result   Final     Normal TSH and B12 levels documented in 2016.    Imaging:  MRI brain 5/2016 shows mild generalized cortical atrophy and mild subcortical and periventricular T2/FLAIR hyperintensities.     Impression:  Ms. Craig is a 85 year old left-handed female who has been followed in [facility] for progressive memory loss. Her functional status and cognitive abilities have been fairly stable over the past year. She is in the moderate stages of Alzheimer's disease and tolerating full dose donepezil and memantine. She has good care at home and Shaun is in touch with the Alzheimer[employer] about local resources.     Recommendations:    Continue donepezil and memantine.     Check BP periodically    Continue staying socially and physically active    Monitor mood     Limit naps to 30 min or less    Return for follow up evaluation in 6-8 months    I spent a total of 40 minutes face-to-face with the patient, and over half of that time was spent counseling regarding the symptoms, diagnosis, medication risks, lifestyle modification, therapeutic options, and prognosis.    Again, thank you for  allowing me to participate in the care of your patient.      Sincerely,    Natalio Gray MD

## 2019-09-06 NOTE — PATIENT INSTRUCTIONS
You saw saw Dr. Natalio Gray at the [facility] for an evaluation of memory loss, likely due to Alzheimer's disease, moderate stage.     Recommendations:    Continue donepezil and memantine. Take donepezil in the mornings.     Check BP periodically    Continue staying socially and physically active    Monitor mood     Limit naps to 30 min or less    Return for follow up evaluation in 6-8 months    Research opportunities:  1. [facility] Caregiver Registry (forms available in our waiting room)  2. Alzheimer's Association TrialMatch:  http://www.alz.org/research/clinical_trials/find_clinical_trials_trialmatch.asp  3. ClinicalTrials.gov

## 2019-09-23 ENCOUNTER — HOSPITAL ENCOUNTER (EMERGENCY)
Facility: CLINIC | Age: 84
Discharge: HOME | End: 2019-09-23
Attending: EMERGENCY MEDICINE | Admitting: EMERGENCY MEDICINE
Payer: MEDICARE

## 2019-09-23 ENCOUNTER — APPOINTMENT (OUTPATIENT)
Dept: GENERAL RADIOLOGY | Facility: CLINIC | Age: 84
End: 2019-09-23
Attending: EMERGENCY MEDICINE
Payer: MEDICARE

## 2019-09-23 ENCOUNTER — APPOINTMENT (OUTPATIENT)
Dept: CT IMAGING | Facility: CLINIC | Age: 84
End: 2019-09-23
Attending: EMERGENCY MEDICINE
Payer: MEDICARE

## 2019-09-23 VITALS
HEART RATE: 56 BPM | OXYGEN SATURATION: 95 % | DIASTOLIC BLOOD PRESSURE: 66 MMHG | SYSTOLIC BLOOD PRESSURE: 158 MMHG | RESPIRATION RATE: 16 BRPM | TEMPERATURE: 97.7 F

## 2019-09-23 DIAGNOSIS — M54.42 BILATERAL LOW BACK PAIN WITH LEFT-SIDED SCIATICA, UNSPECIFIED CHRONICITY: ICD-10-CM

## 2019-09-23 DIAGNOSIS — N30.00 ACUTE CYSTITIS WITHOUT HEMATURIA: ICD-10-CM

## 2019-09-23 LAB
ALBUMIN UR-MCNC: 10 MG/DL
ANION GAP SERPL CALCULATED.3IONS-SCNC: 5 MMOL/L (ref 3–14)
APPEARANCE UR: ABNORMAL
BACTERIA #/AREA URNS HPF: ABNORMAL /HPF
BASOPHILS # BLD AUTO: 0 10E9/L (ref 0–0.2)
BASOPHILS NFR BLD AUTO: 0.4 %
BILIRUB UR QL STRIP: NEGATIVE
BUN SERPL-MCNC: 24 MG/DL (ref 7–30)
CALCIUM SERPL-MCNC: 9.4 MG/DL (ref 8.5–10.1)
CHLORIDE SERPL-SCNC: 109 MMOL/L (ref 94–109)
CO2 SERPL-SCNC: 26 MMOL/L (ref 20–32)
COLOR UR AUTO: YELLOW
CREAT SERPL-MCNC: 1.15 MG/DL (ref 0.52–1.04)
DIFFERENTIAL METHOD BLD: NORMAL
EOSINOPHIL # BLD AUTO: 0.2 10E9/L (ref 0–0.7)
EOSINOPHIL NFR BLD AUTO: 2.1 %
ERYTHROCYTE [DISTWIDTH] IN BLOOD BY AUTOMATED COUNT: 13.7 % (ref 10–15)
GFR SERPL CREATININE-BSD FRML MDRD: 43 ML/MIN/{1.73_M2}
GLUCOSE SERPL-MCNC: 102 MG/DL (ref 70–99)
GLUCOSE UR STRIP-MCNC: NEGATIVE MG/DL
HCT VFR BLD AUTO: 43.6 % (ref 35–47)
HGB BLD-MCNC: 14.4 G/DL (ref 11.7–15.7)
HGB UR QL STRIP: NEGATIVE
HYALINE CASTS #/AREA URNS LPF: 1 /LPF (ref 0–2)
IMM GRANULOCYTES # BLD: 0 10E9/L (ref 0–0.4)
IMM GRANULOCYTES NFR BLD: 0.4 %
KETONES UR STRIP-MCNC: NEGATIVE MG/DL
LEUKOCYTE ESTERASE UR QL STRIP: ABNORMAL
LYMPHOCYTES # BLD AUTO: 2.6 10E9/L (ref 0.8–5.3)
LYMPHOCYTES NFR BLD AUTO: 30.2 %
MCH RBC QN AUTO: 30.5 PG (ref 26.5–33)
MCHC RBC AUTO-ENTMCNC: 33 G/DL (ref 31.5–36.5)
MCV RBC AUTO: 92 FL (ref 78–100)
MONOCYTES # BLD AUTO: 0.8 10E9/L (ref 0–1.3)
MONOCYTES NFR BLD AUTO: 9.7 %
MUCOUS THREADS #/AREA URNS LPF: PRESENT /LPF
NEUTROPHILS # BLD AUTO: 4.8 10E9/L (ref 1.6–8.3)
NEUTROPHILS NFR BLD AUTO: 57.2 %
NITRATE UR QL: POSITIVE
NRBC # BLD AUTO: 0 10*3/UL
NRBC BLD AUTO-RTO: 0 /100
PH UR STRIP: 6.5 PH (ref 5–7)
PLATELET # BLD AUTO: 168 10E9/L (ref 150–450)
POTASSIUM SERPL-SCNC: 4.4 MMOL/L (ref 3.4–5.3)
RBC # BLD AUTO: 4.72 10E12/L (ref 3.8–5.2)
RBC #/AREA URNS AUTO: 4 /HPF (ref 0–2)
SODIUM SERPL-SCNC: 140 MMOL/L (ref 133–144)
SOURCE: ABNORMAL
SP GR UR STRIP: 1.02 (ref 1–1.03)
SQUAMOUS #/AREA URNS AUTO: 1 /HPF (ref 0–1)
UROBILINOGEN UR STRIP-MCNC: NORMAL MG/DL (ref 0–2)
WBC # BLD AUTO: 8.4 10E9/L (ref 4–11)
WBC #/AREA URNS AUTO: 57 /HPF (ref 0–5)

## 2019-09-23 PROCEDURE — 87186 SC STD MICRODIL/AGAR DIL: CPT | Performed by: EMERGENCY MEDICINE

## 2019-09-23 PROCEDURE — 36415 COLL VENOUS BLD VENIPUNCTURE: CPT | Performed by: EMERGENCY MEDICINE

## 2019-09-23 PROCEDURE — 81001 URINALYSIS AUTO W/SCOPE: CPT | Performed by: EMERGENCY MEDICINE

## 2019-09-23 PROCEDURE — 72072 X-RAY EXAM THORAC SPINE 3VWS: CPT

## 2019-09-23 PROCEDURE — 72100 X-RAY EXAM L-S SPINE 2/3 VWS: CPT

## 2019-09-23 PROCEDURE — 72125 CT NECK SPINE W/O DYE: CPT

## 2019-09-23 PROCEDURE — 99285 EMERGENCY DEPT VISIT HI MDM: CPT | Mod: 25

## 2019-09-23 PROCEDURE — 96365 THER/PROPH/DIAG IV INF INIT: CPT

## 2019-09-23 PROCEDURE — 87088 URINE BACTERIA CULTURE: CPT | Performed by: EMERGENCY MEDICINE

## 2019-09-23 PROCEDURE — 25000128 H RX IP 250 OP 636: Performed by: EMERGENCY MEDICINE

## 2019-09-23 PROCEDURE — 80048 BASIC METABOLIC PNL TOTAL CA: CPT | Performed by: EMERGENCY MEDICINE

## 2019-09-23 PROCEDURE — 70450 CT HEAD/BRAIN W/O DYE: CPT

## 2019-09-23 PROCEDURE — 85025 COMPLETE CBC W/AUTO DIFF WBC: CPT | Performed by: EMERGENCY MEDICINE

## 2019-09-23 PROCEDURE — 87086 URINE CULTURE/COLONY COUNT: CPT | Performed by: EMERGENCY MEDICINE

## 2019-09-23 RX ORDER — CEPHALEXIN 500 MG/1
500 CAPSULE ORAL 4 TIMES DAILY
Qty: 40 CAPSULE | Refills: 0 | Status: SHIPPED | OUTPATIENT
Start: 2019-09-23 | End: 2019-10-03

## 2019-09-23 RX ORDER — CEFTRIAXONE 2 G/1
2 INJECTION, POWDER, FOR SOLUTION INTRAMUSCULAR; INTRAVENOUS ONCE
Status: COMPLETED | OUTPATIENT
Start: 2019-09-23 | End: 2019-09-23

## 2019-09-23 RX ADMIN — CEFTRIAXONE 2 G: 2 INJECTION, POWDER, FOR SOLUTION INTRAMUSCULAR; INTRAVENOUS at 20:25

## 2019-09-23 NOTE — ED AVS SNAPSHOT
[facility]  [address]  [facility] [address]  Phone:  [phone]  Fax:  [phone]                                    Chris Craig   MRN: [id number]    Department:  [facility]   Date of Visit:  9/23/2019           After Visit Summary Signature Page    I have received my discharge instructions, and my questions have been answered. I have discussed any challenges I see with this plan with the nurse or doctor.    ..........................................................................................................................................  Patient/Patient Representative Signature      ..........................................................................................................................................  Patient Representative Print Name and Relationship to Patient    ..................................................               ................................................  Date                                   Time    ..........................................................................................................................................  Reviewed by Signature/Title    ...................................................              ..............................................  Date                                               Time          22EPIC Rev 08/18

## 2019-09-23 NOTE — ED NOTES
Bed: ED02  Expected date: 9/23/19  Expected time: 4:11 PM  Means of arrival: Ambulance  Comments:  BV3

## 2019-09-23 NOTE — ED TRIAGE NOTES
found patient lying supine on the floor of bedroom.  He assisted patient up and back to bed.  He then called 911.  She has been complaining of low back pain for a couple of days.  ABCs intact.  History of dementia.  Patient incontinent of urine and stool.

## 2019-09-23 NOTE — ED PROVIDER NOTES
History   Chief Complaint:  Fall    HPI   History is limited by patient's history of Alzheimer's dementia. History supplemented by patient's .    Chris Craig is a 85 year old female with a history of hypertension and Alzheimer's dementia who presents via EMS for evaluation of a fall. The patient's  reports that reports that prior to arrival he assisted the patient in sitting on the edge of the bed at which point he left the room for 10 minutes. Upon returning, he found the patient lying on the ground incontinent of urine and stool. He did not witness a fall however the  has concern she fell out of the bed. He was unable to help the patient up prompting him to contact EMS. The patient endorsed low back pain to her family although she has had this complaint for 2-3 days. She also attests to feeling cold here in the ED. The patient does seem to be acting at her baseline consistent with her history of Alzheimer's per the . Currently, the patient lives in a private home with her . She wears Depends at baseline although is able to use the restroom when she remembers to per her son. The patient denies any other pain and has not had any fevers, vomiting, diarrhea, appetite changes, or cough.    Allergies:  Sulfa drugs     Medications:    aspirin 325 MG tablet  Calcium Carbonate-Vitamin D (CALCIUM 600+D PO)  diltiazem (CARDIZEM CD; CARTIA XT) 360 MG 24 hr CD capsule  donepezil (ARICEPT) 10 MG tablet  LISINOPRIL PO  memantine (NAMENDA) 10 MG tablet  simvastatin (ZOCOR) 20 MG tablet  VITAMIN D PO  VITAMIN D, CHOLECALCIFEROL, PO    Past Medical History:    Alzheimer's disease  Alzheimer's dementia  Mild cognitive impairment  Hypertension  Mumps    Past Surgical History:    Bladder surgery  Bunionectomy  ORIF right ankle  Hysterectomy  Tonsillectomy    Family History:    Cardiac disease-  at age 84 (mother)  MI-  at age 70 (father)    Social History:  Smoking status: Never  smoker  Alcohol use: Yes  Drug use: No  Marital Status:   [2]     Review of Systems   Unable to perform ROS: Dementia     Physical Exam   Patient Vitals for the past 24 hrs:   BP Temp Temp src Pulse Resp SpO2   09/23/19 2030 (!) 158/66 -- -- 56 -- 95 %   09/23/19 2015 -- -- -- -- -- 97 %   09/23/19 2000 (!) 130/102 -- -- 61 -- 95 %   09/23/19 1945 (!) 176/76 -- -- 60 -- 91 %   09/23/19 1930 (!) 161/70 -- -- 56 -- 97 %   09/23/19 1915 -- -- -- -- -- 99 %   09/23/19 1900 (!) 165/76 -- -- 57 -- 98 %   09/23/19 1800 -- -- -- -- -- 96 %   09/23/19 1745 -- -- -- -- -- 97 %   09/23/19 1730 -- -- -- 57 -- 97 %   09/23/19 1715 -- -- -- -- -- 98 %   09/23/19 1700 -- -- -- 54 -- 97 %   09/23/19 1645 -- -- -- -- -- 99 %   09/23/19 1630 (!) 174/82 -- -- -- -- 97 %   09/23/19 1626 (!) 174/82 97.7  F (36.5  C) Oral 55 16 98 %     Physical Exam  General: Resting on the bed.  Head: No obvious trauma to head.  Ears, Nose, Throat:  External ears normal.  Nose normal. Clear TMs  Eyes:  Conjunctivae clear.  Pupils are equal, round, and reactive.   Neck: Normal range of motion.  Neck supple.   CV: Regular rate and rhythm.  No murmurs.      Respiratory: Effort normal and breath sounds normal.  No wheezing or crackles.   Gastrointestinal: Soft.  No distension. There is no tenderness.  There is no rigidity, no rebound and no guarding.   Musculoskeletal: Nontender cervical, thoracic, lumbar spine to palpation.  No step-off or deformity appreciable.  Neuro: Alert. Moving all extremities appropriately..  Gross muscle strength intact of the proximal and distal bilateral lower extremities.  Sensation intact to light touch in all 4 extremities.  2+ patellar reflexes.   Skin: Skin is warm and dry.  No rash noted.     Emergency Department Course   ECG (16:28:56):  Rate 54 bpm. VA interval 144. QRS duration 84. QT/QTc 472/447. P-R-T axes 66 30 70. Sinus bradycardia. No significant changes compared to prior EKG dated 04/01/16. Interpreted at  1638 by Delfina Farooq MD.    Imaging:  Radiographic findings were communicated with the patient and family who voiced understanding of the findings.    Head CT w/o Contrast:  1.  Mild to moderate chronic ischemic changes intracranially. No specific evidence for acute infarction.  As read by Radiology.    Cervical Spine CT w/o Contrast:  1.  No fracture or subluxation.  2.  No high-grade spinal canal or neural foraminal stenosis.  As read by Radiology.    XR Thoracic Spine 3 Views:  There is anterior wedging/compression superior endplate of an upper thoracic vertebral body. I believe this represents T6 with loss of height approaching 15-20%. Otherwise satisfactory height and alignment in the thoracic spine. Please   correlate to point of maximal tenderness to confirm possible acuity. No comparison studies of the thorax or lateral views of chest are available. Slight elevation right hemidiaphragm. Heart size is normal with a few strands of fibrosis noted in both   lungs. Vascular calcification.   As read by Radiology.    XR Lumbar Spine 2/3 Views:  Slight accentuation of lumbar lordosis with satisfactory height and alignment. Mild endplate osteophytes and facet joint arthropathy. Very mild rightward lumbar curve apex at L2-L3. Mild degenerative changes involving SI joints. Sacral neural foramina are intact. Vascular calcification. Satisfactory sacrococcygeal alignment. No fracture in the lumbar spine is identified.  As read by Radiology.    Laboratory:  CBC: WNL (WBC 8.4, HGB 14.4, )  BMP: Glucose 102 (H), Creatinine 1.15 (H), GFR 43 (L) o/w WNL  UA Reflex to Microscopic: Protein Albumin 10, Nitrite- positive, Leukocyte Esterase- large, RBC 4 (H), WBC 57 (H), Bacteria- few, Mucous- present o/w negative    Urine Culture: pending    Interventions:  2025: Rocephin 2 g IV    Emergency Department Course:  The patient presents to the ED via EMS.     Past medical records, nursing notes, and vitals reviewed.    1650: I performed an exam of the patient and obtained history, as documented above.    The patient provided a urine sample here in the emergency department. This was sent for laboratory testing, findings above. EKG obtained, results above. IV inserted and blood drawn. The patient was sent for a head CT, cervical spine CT, thoracic spine x-ray, and lumbar spine x-ray while in the emergency department, findings above.    2004: I rechecked the patient. Explained findings to the patient.    Findings and plan explained to the Patient and spouse. Patient discharged home with instructions regarding supportive care, medications, and reasons to return. The importance of close follow-up was reviewed.     Impression & Plan    Medical Decision Making:  Chris Craig is a 85 year old female who presents for evaluation of a fall and low back pain. This was clearly a mechanical fall, not syncope. There were no prodromal symptoms so I doubt stroke, cardiac arrhythmia or other serious etiology.  Detailed exam shows no tenderness.  Based on limited history I did do basic blood work and urinalysis.  Results as above.  EKG shows sinus rhythm no acute ST-T wave changes.  No evidence of arrhythmia.  Patient denies any chest pain or shortness of breath.  I do not suspect arrhythmia, PE, acute coronary syndrome.  Nonfocal neurologic exam, no evidence of stroke on exam.  Head CT shows chronic ischemic changes but no acute intracranial hemorrhage, infarct, mass.  CT cervical spine shows no evidence of acute subluxation or fracture.  X-ray of the lumbar and thoracic spine shows no acute compression fracture, dislocation, subluxation.  There is noted to be a T6 compression but patient has no point tenderness to suspect acute fracture.  CBC shows no leukocytosis or anemia.  BMP shows no acute electrolyte metabolic or renal dysfunction.  Urinalysis is positive for infection.  Given that she has had low back pain suspect possible mild  pyelonephritis.  She was given IV antibiotics.  She is able to eat and drink, she is tolerating p.o., afebrile, reassuring blood work think that she would be appropriate for outpatient management.  No further imaging required.  I had the tech ambulate the patient and she did well. Based on this I would send home for outpatient management. Imaging of the spine and head were negative for fractures and other concerning findings. Urinalysis is demonstrative of infection. There is no clinical evidence of pyelonephritis, appendicitis, colitis, diverticulitis or any intraabdominal catastrophe. The patient will be started on antibiotics for the infection. Return if increasing pain, vomiting, fever, or inability to tolerate the oral antibiotic.  Follow up with primary physician is indicated if not improving in 2-3 days.       Diagnosis:    ICD-10-CM   1. Acute cystitis without hematuria N30.00   2. Bilateral low back pain with left-sided sciatica, unspecified chronicity M54.42       Disposition:  Discharged to home    Discharge Medications:   Details   cephALEXin (KEFLEX) 500 MG capsule Take 1 capsule (500 mg) by mouth 4 times daily for 10 days, Disp-40 capsule, R-0, Local Print       Rafi Ellis  9/23/2019   [facility] EMERGENCY DEPARTMENT  I, Rafi Ellis, am serving as a scribe at 4:50 PM on 9/23/2019 to document services personally performed by Delfina Farooq MD based on my observations and the provider's statements to me.        Delfina Farooq MD  09/24/19 0152

## 2019-09-23 NOTE — ED NOTES
Patient incontinent of urine and stool.  Patient cleaned up, brief changed, straight cath urine obtained.

## 2019-09-24 LAB — INTERPRETATION ECG - MUSE: NORMAL

## 2019-09-24 NOTE — DISCHARGE INSTRUCTIONS
Please see your PCP in 2-3 days for a recheck.  If you have increasing pain, loss of bowel or bladder function, numbness in your groin, unable to walk, fevers >101 or other acute changes, return to the ED.      Discharge Instructions  Urinary Tract Infection  You or your child have been diagnosed with a urinary tract infection, or UTI. The urinary tract includes the kidneys (which make urine/pee), ureters (the tubes that carry urine/pee from the kidneys to the bladder), the bladder (which stores urine/pee), and urethra (the tube that carries urine/pee out of the bladder). Urinary tract infections occur when bacteria travel up the urethra into the bladder (bladder infection) and, in some cases, from there into the kidneys (kidney infection).  Generally, every Emergency Department visit should have a follow-up clinic visit with either a primary or a specialty clinic/provider. Please follow-up as instructed by your emergency provider today.  Return to the Emergency Department if:  You or your child have severe back pain.  You or your child are vomiting (throwing up) so that you cannot take your medicine.  You or your child have a new fever (had not previously had a fever) over 101 F.  You or your child have confusion or are very weak, or feel very ill.  Your child seems much more ill, will not wake up, will not respond right, or is crying for a long time and will not calm down.  You or your child are showing signs of dehydration. These signs may include decreased urination (pee), dry mouth/gums/tongue, or decreased activity.    Follow-up with your provider:   Children under 24 months need to be seen by their regular provider within one week after a diagnosis of a UTI. It may be necessary to do some more tests to look at the child s kidney or bladder.  You should begin to feel better within 24 - 48 hours of starting your antibiotic; follow-up with your regular clinic/doctor/provider if this is not the  case.    Treatment:   You will be treated with an antibiotic to kill the bacteria. We have to make an educated guess, based on what we know about common bacteria and antibiotics, as to which antibiotic will work for your infection. We will be correct most times but there will be some cases where the antibiotic chosen is not correct (see urine cultures below).  Take a pain medication such as acetaminophen (Tylenol ) or ibuprofen (Advil , Motrin , Nuprin ).  Phenazopyridine (Pyridium , Uristat ) is a prescription medication that numbs the bladder to reduce the burning pain of some UTIs.  The same medication is available in a non-prescription version (Azo-Standard , Urodol ). This medication will change the color of the urine and tears (usually blue or orange). If you wear contacts, do not wear them while taking this medication as they may be stained by the medication.    Urine Cultures:  If indicated, a urine culture may have been performed today. This test generally takes 24-48 hours to complete so the results are not known at this time. The results can confirm that an infection is present but also determine which antibiotic is effective for the specific bacteria that is causing the infection. If your urine culture shows that the antibiotic you were given today will not work to treat your infection, we will attempt to contact you to make arrangements to change the antibiotic. If the culture confirms that the antibiotic is effective for your infection, you will not be contacted. We often recommend follow-up with your regular physician/provider on the culture results regardless of this process.    Antibiotic Warning:   If you have been placed on antibiotics - watch for signs of allergic reaction.  These include rash, lip swelling, difficulty breathing, wheezing, and dizziness.  If you develop any of these symptoms, stop the antibiotic immediately and go to an emergency room or urgent care for evaluation.    Probiotics:  "If you have been given an antibiotic, you may want to also take a probiotic pill or eat yogurt with live cultures. Probiotics have \"good bacteria\" to help your intestines stay healthy. Studies have shown that probiotics help prevent diarrhea and other intestine problems (including C. diff infection) when you take antibiotics. You can buy these without a prescription in the pharmacy section of the store.   If you were given a prescription for medicine here today, be sure to read all of the information (including the package insert) that comes with your prescription.  This will include important information about the medicine, its side effects, and any warnings that you need to know about.  The pharmacist who fills the prescription can provide more information and answer questions you may have about the medicine.  If you have questions or concerns that the pharmacist cannot address, please call or return to the Emergency Department.   Remember that you can always come back to the Emergency Department if you are not able to see your regular provider in the amount of time listed above, if you get any new symptoms, or if there is anything that worries you.    "

## 2019-09-26 LAB
BACTERIA SPEC CULT: ABNORMAL
BACTERIA SPEC CULT: ABNORMAL
SPECIMEN SOURCE: ABNORMAL

## 2020-01-01 ENCOUNTER — VIRTUAL VISIT (OUTPATIENT)
Dept: NEUROLOGY | Facility: CLINIC | Age: 85
End: 2020-01-01
Payer: MEDICARE

## 2020-01-01 ENCOUNTER — OFFICE VISIT (OUTPATIENT)
Dept: NEUROLOGY | Facility: CLINIC | Age: 85
End: 2020-01-01
Payer: MEDICARE

## 2020-01-01 VITALS
DIASTOLIC BLOOD PRESSURE: 80 MMHG | WEIGHT: 201.6 LBS | SYSTOLIC BLOOD PRESSURE: 131 MMHG | BODY MASS INDEX: 31.58 KG/M2 | HEART RATE: 53 BPM

## 2020-01-01 DIAGNOSIS — G30.1 LATE ONSET ALZHEIMER'S DISEASE WITHOUT BEHAVIORAL DISTURBANCE (H): Primary | ICD-10-CM

## 2020-01-01 DIAGNOSIS — F02.80 LATE ONSET ALZHEIMER'S DISEASE WITHOUT BEHAVIORAL DISTURBANCE (H): ICD-10-CM

## 2020-01-01 DIAGNOSIS — G31.84 MCI (MILD COGNITIVE IMPAIRMENT): ICD-10-CM

## 2020-01-01 DIAGNOSIS — G30.1 LATE ONSET ALZHEIMER'S DISEASE WITHOUT BEHAVIORAL DISTURBANCE (H): ICD-10-CM

## 2020-01-01 DIAGNOSIS — F02.80 LATE ONSET ALZHEIMER'S DISEASE WITHOUT BEHAVIORAL DISTURBANCE (H): Primary | ICD-10-CM

## 2020-01-01 RX ORDER — MEMANTINE HYDROCHLORIDE 10 MG/1
10 TABLET ORAL 2 TIMES DAILY
Qty: 180 TABLET | Refills: 3 | Status: ON HOLD | OUTPATIENT
Start: 2020-01-01 | End: 2021-01-01

## 2020-01-01 RX ORDER — DONEPEZIL HYDROCHLORIDE 10 MG/1
10 TABLET, FILM COATED ORAL EVERY MORNING
Qty: 90 TABLET | Refills: 3 | Status: ON HOLD | OUTPATIENT
Start: 2020-01-01 | End: 2021-01-01

## 2020-03-06 NOTE — LETTER
"3/6/2020       RE: Chris Craig  [address]  [facility] [address]     Dear Colleague,    Thank you for referring your patient, Chris Craig, to the [facility] at [facility]. Please see a copy of my visit note below.    Chief Complaint: Follow up    History of Present Illness:  I had the pleasure of seeing Ms. Craig in follow up consultation for her symptoms of memory loss. She is accompanied to today's visit by her , Shaun, who assists in providing the history.     Ms. Craig is an 85 year-old left-handed woman who has [facility] for progressive memory loss. Evaluations have been consistent with diagnosis of Alzheimer's disease. She is currently on donepezil and memantine.     She was last seen in September 2019. In the interim, Shaun reports that she is saying no to him more often, such as when her asks her to ride with him to do errands. She also is saying \"what?\" to get Shaun to repeat himself. She takes naps during the daytime, a couple hours here and there, but sleeps well at night. She gets a little impatient but has no changes in mood. She has not been having many vivid dreams. Shaun has not been checking her BP at home. She is no longer reading; she may look at the newspaper for 5-10 min. She forgets where she lives. They moved to their current residence in 2000.    In terms of functional status, Shaun does everything for her and their daughter checks on her on Saturdays. Shaun oversees her medications. She is swallowing well and has had no changes in motor function and no weight loss.     Medical review of systems:  Mild runny nose. The comprehensive review of systems is otherwise reviewed and negative.     Patient Active Problem List   Diagnosis     Post-op pain     ACP (advance care planning)     MCI (mild cognitive impairment)       Past Medical History:   Diagnosis Date     Arthritis      Complication of " anesthesia      H/O transfusion     but not entire;y sure     Hypertension      Mumps      She had normal childhood development and reports no major head injuries.     Past Surgical History:   Procedure Laterality Date     BLADDER SURGERY       BUNIONECTOMY RT/LT       C OPEN RX ANKLE DISLOCATN+FIXATN      right     COLONOSCOPY       COLONOSCOPY  3/13/2012    Procedure:COLONOSCOPY; COLONOSCOPY ; Surgeon:LUCAS MOODY; Location: GI     GYN SURGERY      hysterectomy     HC REMOVAL OF TONSILS,<13 Y/O       OPEN REDUCTION INTERNAL FIXATION ANKLE Right 3/30/2016    Procedure: OPEN REDUCTION INTERNAL FIXATION ANKLE;  Surgeon: Denis Gaffney MD;  Location:  OR       Current Outpatient Medications   Medication Sig Dispense Refill     aspirin 325 MG tablet Take 81 mg by mouth daily        Calcium Carbonate-Vitamin D (CALCIUM 600+D PO) Take 2 tablets by mouth daily        diltiazem (CARDIZEM CD; CARTIA XT) 360 MG 24 hr CD capsule Take 180 mg by mouth daily        donepezil (ARICEPT) 10 MG tablet TAKE 1 TABLET (10 MG) BY MOUTH AT BEDTIME (Patient taking differently: TAKE 1 TABLET (10 MG) BY MOUTH IN THE MORNING) 30 tablet 0     LISINOPRIL PO Take 20 mg by mouth daily       memantine (NAMENDA) 10 MG tablet Take 1 tablet (10 mg) by mouth 2 times daily Use as instructed 90 tablet 3     Multiple Vitamins-Minerals (SENIOR MULTIVITAMIN PLUS) TABS Take 1 tablet by mouth daily        simvastatin (ZOCOR) 20 MG tablet Take 20 mg by mouth At Bedtime.       VITAMIN D PO Take 50,000 Units by mouth every 30 days        VITAMIN D, CHOLECALCIFEROL, PO Take 2,000 Units by mouth daily          Allergies   Allergen Reactions     Sulfa Drugs      hives       Family History   Problem Relation Age of Onset     Other - See Comments Mother          at age 84 with cardiac disease      Myocardial Infarction Father          at age 70   Her mother had stroke at age 80  Her father had MI at age 70,  age 70  Brother Antonio  passed away, age 62  5 children  There is no reported family history of neurodegenerative disease.     Social History     Socioeconomic History     Marital status:      Spouse name: Not on file     Number of children: Not on file     Years of education: Not on file     Highest education level: Not on file   Occupational History     Not on file   Social Needs     Financial resource strain: Not on file     Food insecurity:     Worry: Not on file     Inability: Not on file     Transportation needs:     Medical: Not on file     Non-medical: Not on file   Tobacco Use     Smoking status: Never Smoker     Smokeless tobacco: Never Used   Substance and Sexual Activity     Alcohol use: Yes     Alcohol/week: 0.0 standard drinks     Comment: one drink per week     Drug use: No     Sexual activity: Never   Lifestyle     Physical activity:     Days per week: Not on file     Minutes per session: Not on file     Stress: Not on file   Relationships     Social connections:     Talks on phone: Not on file     Gets together: Not on file     Attends Worship service: Not on file     Active member of club or organization: Not on file     Attends meetings of clubs or organizations: Not on file     Relationship status: Not on file     Intimate partner violence:     Fear of current or ex partner: Not on file     Emotionally abused: Not on file     Physically abused: Not on file     Forced sexual activity: Not on file   Other Topics Concern     Parent/sibling w/ CABG, MI or angioplasty before 65F 55M? Not Asked   Social History Narrative    Attended 1 year of college. Worked as  as and manager at a [employer]. Retired when moved to [facility], at age 53. 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 Met  Shaun at [facility]. Shaun is a retired  and is involved in community service.       General Physical examination:  /80 (BP Location: Right arm, Patient Position: Sitting, Cuff Size:  Adult Regular)   Pulse 53   Wt 201 lb 9.6 oz (91.4 kg)   BMI 31.58 kg/m        General: Ms. Craig is well appearing and is appropriately groomed and dressed.  Ext: warm, no edema  Skin: clean, dry, intact    Neurological examination:  Mental status: Ms. Craig  is awake, alert, and appropriate, with fluent speech, speaking in short phrases. She is unable to state the date. She follows simple commands. She names the president as Gallego. No apraxia is noted.  Cranial nerves: Extraocular movements are intact. Smooth pursuit is intact. Saccades are normal in initiation, velocity and amplitude both vertically and horizontally. Facial sensation is intact to light touch. Facial strength is full bilaterally. She is hard of hearing but able to understand.   Motor examination: Bulk is normal throughout. Axial and upper and lower extremity tone is normal. No pronator drift is noted. Strength is 5/5 and symmetric throughout. No fasciculations are noted. There is no tremor or other involuntary movement.  Coordination: Opening and closing of fists and tapping of feet are slowed symmetrically.  Reflexes: Reflexes are symmetric and 2+ at biceps, triceps, and brachioradialis. Patellar reflexes are 1+ bilaterally. Achilles reflexes are not obtainable bilaterally.   Gait: She needs a few attempts to rise from the chair unassisted. She has a steady stance leaning forward with normal stride length and no arm swing. There is no retropulsion.    Labs:  Office Visit on 12/06/2017   Component Date Value Ref Range Status     Specimen Description 12/06/2017 Vagina   Final     Wet Prep 12/06/2017 No yeast seen   Final     Wet Prep 12/06/2017 No Trichomonas seen   Final     Wet Prep 12/06/2017 No clue cells seen   Final   Office Visit on 11/22/2017   Component Date Value Ref Range Status     Color Urine 11/22/2017 Yellow   Final     Appearance Urine 11/22/2017 Clear   Final     Glucose Urine 11/22/2017 Negative  NEG^Negative mg/dL Final      Bilirubin Urine 11/22/2017 Negative  NEG^Negative Final     Ketones Urine 11/22/2017 Negative  NEG^Negative mg/dL Final     Specific Gravity Urine 11/22/2017 1.015  1.003 - 1.035 Final     Blood Urine 11/22/2017 Negative  NEG^Negative Final     pH Urine 11/22/2017 7.5* 5.0 - 7.0 pH Final     Protein Albumin Urine 11/22/2017 Negative  NEG^Negative mg/dL Final     Urobilinogen Urine 11/22/2017 1.0  0.2 - 1.0 EU/dL Final     Nitrite Urine 11/22/2017 Negative  NEG^Negative Final     Leukocyte Esterase Urine 11/22/2017 Negative  NEG^Negative Final     Source 11/22/2017 Midstream Urine   Final   Office Visit on 10/18/2017   Component Date Value Ref Range Status     Color Urine 10/18/2017 Yellow   Final     Appearance Urine 10/18/2017 Clear   Final     Glucose Urine 10/18/2017 Negative  NEG^Negative mg/dL Final     Bilirubin Urine 10/18/2017 Negative  NEG^Negative Final     Ketones Urine 10/18/2017 Negative  NEG^Negative mg/dL Final     Specific Gravity Urine 10/18/2017 1.015  1.003 - 1.035 Final     Blood Urine 10/18/2017 Negative  NEG^Negative Final     pH Urine 10/18/2017 7.0  5.0 - 7.0 pH Final     Protein Albumin Urine 10/18/2017 Trace* NEG^Negative mg/dL Final     Urobilinogen Urine 10/18/2017 2.0* 0.2 - 1.0 EU/dL Final     Nitrite Urine 10/18/2017 Positive* NEG^Negative Final     Leukocyte Esterase Urine 10/18/2017 Small* NEG^Negative Final     Source 10/18/2017 Midstream Urine   Final     Specimen Description 10/18/2017 Midstream Urine   Final     Special Requests 10/18/2017 Specimen received in preservative   Final     Culture Micro 10/18/2017 *  Final                    Value:>100,000 colonies/mL  Escherichia coli       Culture Micro 10/18/2017    Final                    Value:<10,000 colonies/mL  mixed urogenital lina  Susceptibility testing not routinely done       Specimen Description 10/18/2017 Midstream Urine   Final     Special Requests 10/18/2017 Specimen received in [place] [employer]   Final      Culture Micro 10/18/2017 Ureaplasma species isolated*  Final     Specimen Description 10/18/2017 Midstream Urine   Final     Special Requests 10/18/2017 Specimen received in [employer]   Final     Culture Micro 10/18/2017 No large colony Mycoplasma species isolated   Final   Admission on 03/30/2016, Discharged on 04/02/2016   Component Date Value Ref Range Status     Creatinine 03/30/2016 0.93  0.52 - 1.04 mg/dL Final     GFR Estimate 03/30/2016 57* >60 mL/min/1.7m2 Final     GFR Estimate If Black 03/30/2016 70  >60 mL/min/1.7m2 Final     Potassium 03/30/2016 4.3  3.4 - 5.3 mmol/L Final     Sodium 04/01/2016 137  133 - 144 mmol/L Final     Potassium 04/01/2016 4.1  3.4 - 5.3 mmol/L Final     Chloride 04/01/2016 104  94 - 109 mmol/L Final     Carbon Dioxide 04/01/2016 29  20 - 32 mmol/L Final     Anion Gap 04/01/2016 4  3 - 14 mmol/L Final     Glucose 04/01/2016 122* 70 - 99 mg/dL Final     Urea Nitrogen 04/01/2016 19  7 - 30 mg/dL Final     Creatinine 04/01/2016 0.80  0.52 - 1.04 mg/dL Final     GFR Estimate 04/01/2016 69  >60 mL/min/1.7m2 Final     GFR Estimate If Black 04/01/2016 83  >60 mL/min/1.7m2 Final     Calcium 04/01/2016 8.6  8.5 - 10.1 mg/dL Final     WBC 04/01/2016 8.8  4.0 - 11.0 10e9/L Final     RBC Count 04/01/2016 3.85  3.8 - 5.2 10e12/L Final     Hemoglobin 04/01/2016 11.7  11.7 - 15.7 g/dL Final     Hematocrit 04/01/2016 35.1  35.0 - 47.0 % Final     MCV 04/01/2016 91  78 - 100 fl Final     MCH 04/01/2016 30.4  26.5 - 33.0 pg Final     MCHC 04/01/2016 33.3  31.5 - 36.5 g/dL Final     RDW 04/01/2016 13.5  10.0 - 15.0 % Final     Platelet Count 04/01/2016 230  150 - 450 10e9/L Final     Diff Method 04/01/2016 Automated Method   Final     % Neutrophils 04/01/2016 76.6  % Final     % Lymphocytes 04/01/2016 12.6  % Final     % Monocytes 04/01/2016 7.5  % Final     % Eosinophils 04/01/2016 2.6  % Final     % Basophils 04/01/2016 0.1  % Final     % Immature Granulocytes 04/01/2016 0.6  %  Final     Nucleated RBCs 04/01/2016 0  0 /100 Final     Absolute Neutrophil 04/01/2016 6.8  1.6 - 8.3 10e9/L Final     Absolute Lymphocytes 04/01/2016 1.1  0.8 - 5.3 10e9/L Final     Absolute Monocytes 04/01/2016 0.7  0.0 - 1.3 10e9/L Final     Absolute Eosinophils 04/01/2016 0.2  0.0 - 0.7 10e9/L Final     Absolute Basophils 04/01/2016 0.0  0.0 - 0.2 10e9/L Final     Abs Immature Granulocytes 04/01/2016 0.1  0 - 0.4 10e9/L Final     Absolute Nucleated RBC 04/01/2016 0.0   Final     Interpretation ECG 04/01/2016 Click View Image link to view waveform and result   Final     Troponin I ES 04/01/2016   0.000 - 0.045 ug/L Final                    Value:<0.015  The 99th percentile for upper reference range is 0.045 ug/L.  Troponin values in   the range of 0.045 - 0.120 ug/L may be associated with risks of adverse   clinical events.       D Dimer 04/01/2016 4.7* 0.0 - 0.50 ug/ml FEU Final     Interpretation ECG 04/01/2016 Click View Image link to view waveform and result   Final     Normal TSH and B12 levels documented in 2016.    Imaging:  MRI brain 5/2016 shows mild generalized cortical atrophy and mild subcortical and periventricular T2/FLAIR hyperintensities.     Impression:  Ms. Craig is a 85 year old left-handed female who has been [facility] in [facility] for progressive memory loss. Her decline functional status and cognitive abilities has been slowly progressive. She is in the moderate stages of Alzheimer's disease and tolerating full dose donepezil and memantine. She has good care at home and Shaun is aware oflocal resources.      Recommendations:    Continue donepezil and memantine. Take donepezil in the mornings.     Check BP about once a week.    Continue staying socially and physically active.    Monitor mood.    Limit naps to 30 min or less during the daytime.    Return for follow up evaluation in 6 months.    I spent a total of 30 minutes face-to-face with the patient, and over half of that time was  spent counseling regarding the symptoms, diagnosis, medication risks, lifestyle modification, therapeutic options, and prognosis.    Again, thank you for allowing me to participate in the care of your patient.      Sincerely,    Natalio Gray MD

## 2020-03-06 NOTE — Clinical Note
"3/6/2020       RE: Chris Craig  [address]  [facility] [address]     Dear Colleague,    Thank you for referring your patient, Chris Craig, to the [facility] at [facility]. Please see a copy of my visit note below.    Chief Complaint: Follow up    History of Present Illness:  I had the pleasure of seeing Ms. Craig in follow up consultation for her symptoms of memory loss. She is accompanied to today's visit by her , Shaun, who assists in providing the history.     Ms. Craig is an 85 year-old left-handed woman who has been followed in [facility] for progressive memory loss. Evaluations have been consistent with diagnosis of Alzheimer's disease. She is currently on donepezil and memantine.     She was last seen in September 2019. In the interim, Shaun reports that she is saying no to him more often, such as when her asks her to ride with him to do errands. She also is saying \"what?\" to get Shaun to repeat himself. She takes naps during the daytime, a couple hours here and there, but sleeps well at night. She gets a little impatient but has no changes in mood. She has not been having many vivid dreams. Shaun has not been checking her BP at home. She is no longer reading; she may look at the newspaper for 5-10 min. She forgets where she lives. They moved to their current residence in 2000.    In terms of functional status, Shaun does everything for her and their daughter checks on her on Saturdays. Shaun oversees her medications. She is swallowing well and has had no changes in motor function and no weight loss.     Medical review of systems:  Mild runny nose. The comprehensive review of systems is otherwise reviewed and negative.     Patient Active Problem List   Diagnosis     Post-op pain     ACP (advance care planning)     MCI (mild cognitive impairment)       Past Medical History:   Diagnosis Date     Arthritis      Complication of " anesthesia      H/O transfusion     but not entire;y sure     Hypertension      Mumps      She had normal childhood development and reports no major head injuries.     Past Surgical History:   Procedure Laterality Date     BLADDER SURGERY       BUNIONECTOMY RT/LT       C OPEN RX ANKLE DISLOCATN+FIXATN      right     COLONOSCOPY       COLONOSCOPY  3/13/2012    Procedure:COLONOSCOPY; COLONOSCOPY ; Surgeon:LUCAS MOODY; Location: GI     GYN SURGERY      hysterectomy     HC REMOVAL OF TONSILS,<13 Y/O       OPEN REDUCTION INTERNAL FIXATION ANKLE Right 3/30/2016    Procedure: OPEN REDUCTION INTERNAL FIXATION ANKLE;  Surgeon: Denis Gaffney MD;  Location:  OR       Current Outpatient Medications   Medication Sig Dispense Refill     aspirin 325 MG tablet Take 81 mg by mouth daily        Calcium Carbonate-Vitamin D (CALCIUM 600+D PO) Take 2 tablets by mouth daily        diltiazem (CARDIZEM CD; CARTIA XT) 360 MG 24 hr CD capsule Take 180 mg by mouth daily        donepezil (ARICEPT) 10 MG tablet TAKE 1 TABLET (10 MG) BY MOUTH AT BEDTIME (Patient taking differently: TAKE 1 TABLET (10 MG) BY MOUTH IN THE MORNING) 30 tablet 0     LISINOPRIL PO Take 20 mg by mouth daily       memantine (NAMENDA) 10 MG tablet Take 1 tablet (10 mg) by mouth 2 times daily Use as instructed 90 tablet 3     Multiple Vitamins-Minerals (SENIOR MULTIVITAMIN PLUS) TABS Take 1 tablet by mouth daily        simvastatin (ZOCOR) 20 MG tablet Take 20 mg by mouth At Bedtime.       VITAMIN D PO Take 50,000 Units by mouth every 30 days        VITAMIN D, CHOLECALCIFEROL, PO Take 2,000 Units by mouth daily          Allergies   Allergen Reactions     Sulfa Drugs      hives       Family History   Problem Relation Age of Onset     Other - See Comments Mother          at age 84 with cardiac disease      Myocardial Infarction Father          at age 70   Her mother had stroke at age 80  Her father had MI at age 70,  age 70  Brother Antonio  passed away, age 62  5 children  There is no reported family history of neurodegenerative disease.     Social History     Socioeconomic History     Marital status:      Spouse name: Not on file     Number of children: Not on file     Years of education: Not on file     Highest education level: Not on file   Occupational History     Not on file   Social Needs     Financial resource strain: Not on file     Food insecurity:     Worry: Not on file     Inability: Not on file     Transportation needs:     Medical: Not on file     Non-medical: Not on file   Tobacco Use     Smoking status: Never Smoker     Smokeless tobacco: Never Used   Substance and Sexual Activity     Alcohol use: Yes     Alcohol/week: 0.0 standard drinks     Comment: one drink per week     Drug use: No     Sexual activity: Never   Lifestyle     Physical activity:     Days per week: Not on file     Minutes per session: Not on file     Stress: Not on file   Relationships     Social connections:     Talks on phone: Not on file     Gets together: Not on file     Attends Mosque service: Not on file     Active member of club or organization: Not on file     Attends meetings of clubs or organizations: Not on file     Relationship status: Not on file     Intimate partner violence:     Fear of current or ex partner: Not on file     Emotionally abused: Not on file     Physically abused: Not on file     Forced sexual activity: Not on file   Other Topics Concern     Parent/sibling w/ CABG, MI or angioplasty before 65F 55M? Not Asked   Social History Narrative    Attended 1 year of college. Worked as  as and manager at a [employer]. Retired when moved to [facility], at age 53. 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 Met  Shaun at [facility]. Shaun is a retired  and is involved in community service.       General Physical examination:  /80 (BP Location: Right arm, Patient Position: Sitting, Cuff Size:  Adult Regular)   Pulse 53   Wt 201 lb 9.6 oz (91.4 kg)   BMI 31.58 kg/m        General: Ms. Craig is well appearing and is appropriately groomed and dressed.  Ext: warm, no edema  Skin: clean, dry, intact    Neurological examination:  Mental status: Ms. Craig  is awake, alert, and appropriate, with fluent speech, speaking in short phrases. She is unable to state the date. She follows simple commands. She names the president as Gallego. No apraxia is noted.  Cranial nerves: Extraocular movements are intact. Smooth pursuit is intact. Saccades are normal in initiation, velocity and amplitude both vertically and horizontally. Facial sensation is intact to light touch. Facial strength is full bilaterally. She is hard of hearing but able to understand.   Motor examination: Bulk is normal throughout. Axial and upper and lower extremity tone is normal. No pronator drift is noted. Strength is 5/5 and symmetric throughout. No fasciculations are noted. There is no tremor or other involuntary movement.  Coordination: Opening and closing of fists and tapping of feet are slowed symmetrically.  Reflexes: Reflexes are symmetric and 2+ at biceps, triceps, and brachioradialis. Patellar reflexes are 1+ bilaterally. Achilles reflexes are not obtainable bilaterally.   Gait: She needs a few attempts to rise from the chair unassisted. She has a steady stance leaning forward with normal stride length and no arm swing. There is no retropulsion.    Labs:  Office Visit on 12/06/2017   Component Date Value Ref Range Status     Specimen Description 12/06/2017 Vagina   Final     Wet Prep 12/06/2017 No yeast seen   Final     Wet Prep 12/06/2017 No Trichomonas seen   Final     Wet Prep 12/06/2017 No clue cells seen   Final   Office Visit on 11/22/2017   Component Date Value Ref Range Status     Color Urine 11/22/2017 Yellow   Final     Appearance Urine 11/22/2017 Clear   Final     Glucose Urine 11/22/2017 Negative  NEG^Negative mg/dL Final      Bilirubin Urine 11/22/2017 Negative  NEG^Negative Final     Ketones Urine 11/22/2017 Negative  NEG^Negative mg/dL Final     Specific Gravity Urine 11/22/2017 1.015  1.003 - 1.035 Final     Blood Urine 11/22/2017 Negative  NEG^Negative Final     pH Urine 11/22/2017 7.5* 5.0 - 7.0 pH Final     Protein Albumin Urine 11/22/2017 Negative  NEG^Negative mg/dL Final     Urobilinogen Urine 11/22/2017 1.0  0.2 - 1.0 EU/dL Final     Nitrite Urine 11/22/2017 Negative  NEG^Negative Final     Leukocyte Esterase Urine 11/22/2017 Negative  NEG^Negative Final     Source 11/22/2017 Midstream Urine   Final   Office Visit on 10/18/2017   Component Date Value Ref Range Status     Color Urine 10/18/2017 Yellow   Final     Appearance Urine 10/18/2017 Clear   Final     Glucose Urine 10/18/2017 Negative  NEG^Negative mg/dL Final     Bilirubin Urine 10/18/2017 Negative  NEG^Negative Final     Ketones Urine 10/18/2017 Negative  NEG^Negative mg/dL Final     Specific Gravity Urine 10/18/2017 1.015  1.003 - 1.035 Final     Blood Urine 10/18/2017 Negative  NEG^Negative Final     pH Urine 10/18/2017 7.0  5.0 - 7.0 pH Final     Protein Albumin Urine 10/18/2017 Trace* NEG^Negative mg/dL Final     Urobilinogen Urine 10/18/2017 2.0* 0.2 - 1.0 EU/dL Final     Nitrite Urine 10/18/2017 Positive* NEG^Negative Final     Leukocyte Esterase Urine 10/18/2017 Small* NEG^Negative Final     Source 10/18/2017 Midstream Urine   Final     Specimen Description 10/18/2017 Midstream Urine   Final     Special Requests 10/18/2017 Specimen received in preservative   Final     Culture Micro 10/18/2017 *  Final                    Value:>100,000 colonies/mL  Escherichia coli       Culture Micro 10/18/2017    Final                    Value:<10,000 colonies/mL  mixed urogenital lina  Susceptibility testing not routinely done       Specimen Description 10/18/2017 Midstream Urine   Final     Special Requests 10/18/2017 Specimen received in [place] [employer]   Final      Culture Micro 10/18/2017 Ureaplasma species isolated*  Final     Specimen Description 10/18/2017 Midstream Urine   Final     Special Requests 10/18/2017 Specimen received in [place] [employer]   Final     Culture Micro 10/18/2017 No large colony Mycoplasma species isolated   Final   Admission on 03/30/2016, Discharged on 04/02/2016   Component Date Value Ref Range Status     Creatinine 03/30/2016 0.93  0.52 - 1.04 mg/dL Final     GFR Estimate 03/30/2016 57* >60 mL/min/1.7m2 Final     GFR Estimate If Black 03/30/2016 70  >60 mL/min/1.7m2 Final     Potassium 03/30/2016 4.3  3.4 - 5.3 mmol/L Final     Sodium 04/01/2016 137  133 - 144 mmol/L Final     Potassium 04/01/2016 4.1  3.4 - 5.3 mmol/L Final     Chloride 04/01/2016 104  94 - 109 mmol/L Final     Carbon Dioxide 04/01/2016 29  20 - 32 mmol/L Final     Anion Gap 04/01/2016 4  3 - 14 mmol/L Final     Glucose 04/01/2016 122* 70 - 99 mg/dL Final     Urea Nitrogen 04/01/2016 19  7 - 30 mg/dL Final     Creatinine 04/01/2016 0.80  0.52 - 1.04 mg/dL Final     GFR Estimate 04/01/2016 69  >60 mL/min/1.7m2 Final     GFR Estimate If Black 04/01/2016 83  >60 mL/min/1.7m2 Final     Calcium 04/01/2016 8.6  8.5 - 10.1 mg/dL Final     WBC 04/01/2016 8.8  4.0 - 11.0 10e9/L Final     RBC Count 04/01/2016 3.85  3.8 - 5.2 10e12/L Final     Hemoglobin 04/01/2016 11.7  11.7 - 15.7 g/dL Final     Hematocrit 04/01/2016 35.1  35.0 - 47.0 % Final     MCV 04/01/2016 91  78 - 100 fl Final     MCH 04/01/2016 30.4  26.5 - 33.0 pg Final     MCHC 04/01/2016 33.3  31.5 - 36.5 g/dL Final     RDW 04/01/2016 13.5  10.0 - 15.0 % Final     Platelet Count 04/01/2016 230  150 - 450 10e9/L Final     Diff Method 04/01/2016 Automated Method   Final     % Neutrophils 04/01/2016 76.6  % Final     % Lymphocytes 04/01/2016 12.6  % Final     % Monocytes 04/01/2016 7.5  % Final     % Eosinophils 04/01/2016 2.6  % Final     % Basophils 04/01/2016 0.1  % Final     % Immature Granulocytes 04/01/2016 0.6  %  Final     Nucleated RBCs 04/01/2016 0  0 /100 Final     Absolute Neutrophil 04/01/2016 6.8  1.6 - 8.3 10e9/L Final     Absolute Lymphocytes 04/01/2016 1.1  0.8 - 5.3 10e9/L Final     Absolute Monocytes 04/01/2016 0.7  0.0 - 1.3 10e9/L Final     Absolute Eosinophils 04/01/2016 0.2  0.0 - 0.7 10e9/L Final     Absolute Basophils 04/01/2016 0.0  0.0 - 0.2 10e9/L Final     Abs Immature Granulocytes 04/01/2016 0.1  0 - 0.4 10e9/L Final     Absolute Nucleated RBC 04/01/2016 0.0   Final     Interpretation ECG 04/01/2016 Click View Image link to view waveform and result   Final     Troponin I ES 04/01/2016   0.000 - 0.045 ug/L Final                    Value:<0.015  The 99th percentile for upper reference range is 0.045 ug/L.  Troponin values in   the range of 0.045 - 0.120 ug/L may be associated with risks of adverse   clinical events.       D Dimer 04/01/2016 4.7* 0.0 - 0.50 ug/ml FEU Final     Interpretation ECG 04/01/2016 Click View Image link to view waveform and result   Final     Normal TSH and B12 levels documented in 2016.    Imaging:  MRI brain 5/2016 shows mild generalized cortical atrophy and mild subcortical and periventricular T2/FLAIR hyperintensities.     Impression:  Ms. Craig is a 85 year old left-handed female who has been followed in [facility] for progressive memory loss. Her decline functional status and cognitive abilities has been slowly progressive. She is in the moderate stages of Alzheimer's disease and tolerating full dose donepezil and memantine. She has good care at home and Shaun is aware oflocal resources.      Recommendations:    Continue donepezil and memantine. Take donepezil in the mornings.     Check BP about once a week.    Continue staying socially and physically active.    Monitor mood.    Limit naps to 30 min or less during the daytime.    Return for follow up evaluation in 6 months.    I spent a total of 30 minutes face-to-face with the patient, and over half of that time was  spent counseling regarding the symptoms, diagnosis, medication risks, lifestyle modification, therapeutic options, and prognosis.    Again, thank you for allowing me to participate in the care of your patient.      Sincerely,    Natalio Gray MD

## 2020-03-06 NOTE — PATIENT INSTRUCTIONS
You saw saw Dr. Natalio Gray at the [facility] for an evaluation of memory loss, likely due to Alzheimer's disease, moderate stage.      Recommendations:    Continue donepezil and memantine. Take donepezil in the mornings.     Check BP about once a week    Continue staying socially and physically active    Monitor mood     Limit naps to 30 min or less during the daytime    Return for follow up evaluation in 6 months with Dr. White    Research opportunities:  1. [facility] Caregiver Registry (forms available in our waiting room)  2. Alzheimer's Association TrialMatch:  http://[email or web address].org/research/clinical_trials/[email or web address]_trials_trialmatch.asp  3. ClinicalTrials.gov

## 2020-03-06 NOTE — PROGRESS NOTES
"Chief Complaint: Follow up    History of Present Illness:  I had the pleasure of seeing Ms. Craig in follow up consultation for her symptoms of memory loss. She is accompanied to today's visit by her , Shaun, who assists in providing the history.     Ms. Craig is an 85 year-old left-handed woman who [facility] loss. Evaluations have been consistent with diagnosis of Alzheimer's disease. She is currently on donepezil and memantine.     She was last seen in September 2019. In the interim, Shaun reports that she is saying no to him more often, such as when her asks her to ride with him to do errands. She also is saying \"what?\" to get Shaun to repeat himself. She takes naps during the daytime, a couple hours here and there, but sleeps well at night. She gets a little impatient but has no changes in mood. She has not been having many vivid dreams. Shaun has not been checking her BP at home. She is no longer reading; she may look at the newspaper for 5-10 min. She forgets where she lives. They moved to their current residence in 2000.    In terms of functional status, Shaun does everything for her and their daughter checks on her on Saturdays. Shaun oversees her medications. She is swallowing well and has had no changes in motor function and no weight loss.     Medical review of systems:  Mild runny nose. The comprehensive review of systems is otherwise reviewed and negative.     Patient Active Problem List   Diagnosis     Post-op pain     ACP (advance care planning)     MCI (mild cognitive impairment)       Past Medical History:   Diagnosis Date     Arthritis      Complication of anesthesia      H/O transfusion     but not entire;y sure     Hypertension      Mumps      She had normal childhood development and reports no major head injuries.     Past Surgical History:   Procedure Laterality Date     BLADDER SURGERY       BUNIONECTOMY RT/LT       C OPEN RX ANKLE " DISLOCATN+FIXATN      right     COLONOSCOPY       COLONOSCOPY  3/13/2012    Procedure:COLONOSCOPY; COLONOSCOPY ; Surgeon:LUCAS MOODY; Location: GI     GYN SURGERY      hysterectomy     HC REMOVAL OF TONSILS,<11 Y/O       OPEN REDUCTION INTERNAL FIXATION ANKLE Right 3/30/2016    Procedure: OPEN REDUCTION INTERNAL FIXATION ANKLE;  Surgeon: Denis Gaffney MD;  Location:  OR       Current Outpatient Medications   Medication Sig Dispense Refill     aspirin 325 MG tablet Take 81 mg by mouth daily        Calcium Carbonate-Vitamin D (CALCIUM 600+D PO) Take 2 tablets by mouth daily        diltiazem (CARDIZEM CD; CARTIA XT) 360 MG 24 hr CD capsule Take 180 mg by mouth daily        donepezil (ARICEPT) 10 MG tablet TAKE 1 TABLET (10 MG) BY MOUTH AT BEDTIME (Patient taking differently: TAKE 1 TABLET (10 MG) BY MOUTH IN THE MORNING) 30 tablet 0     LISINOPRIL PO Take 20 mg by mouth daily       memantine (NAMENDA) 10 MG tablet Take 1 tablet (10 mg) by mouth 2 times daily Use as instructed 90 tablet 3     Multiple Vitamins-Minerals (SENIOR MULTIVITAMIN PLUS) TABS Take 1 tablet by mouth daily        simvastatin (ZOCOR) 20 MG tablet Take 20 mg by mouth At Bedtime.       VITAMIN D PO Take 50,000 Units by mouth every 30 days        VITAMIN D, CHOLECALCIFEROL, PO Take 2,000 Units by mouth daily          Allergies   Allergen Reactions     Sulfa Drugs      hives       Family History   Problem Relation Age of Onset     Other - See Comments Mother          at age 84 with cardiac disease      Myocardial Infarction Father          at age 70   Her mother had stroke at age 80  Her father had MI at age 70,  age 70  Brother Antonio passed away, age 62  5 children  There is no reported family history of neurodegenerative disease.     Social History     Socioeconomic History     Marital status:      Spouse name: Not on file     Number of children: Not on file     Years of education: Not on file     Highest  education level: Not on file   Occupational History     Not on file   Social Needs     Financial resource strain: Not on file     Food insecurity:     Worry: Not on file     Inability: Not on file     Transportation needs:     Medical: Not on file     Non-medical: Not on file   Tobacco Use     Smoking status: Never Smoker     Smokeless tobacco: Never Used   Substance and Sexual Activity     Alcohol use: Yes     Alcohol/week: 0.0 standard drinks     Comment: one drink per week     Drug use: No     Sexual activity: Never   Lifestyle     Physical activity:     Days per week: Not on file     Minutes per session: Not on file     Stress: Not on file   Relationships     Social connections:     Talks on phone: Not on file     Gets together: Not on file     Attends Anabaptism service: Not on file     Active member of club or organization: Not on file     Attends meetings of clubs or organizations: Not on file     Relationship status: Not on file     Intimate partner violence:     Fear of current or ex partner: Not on file     Emotionally abused: Not on file     Physically abused: Not on file     Forced sexual activity: Not on file   Other Topics Concern     Parent/sibling w/ CABG, MI or angioplasty before 65F 55M? Not Asked   Social History Narrative    Attended 1 year of college. Worked as  as and manager at a [employer]. Retired when moved to [facility], at age 53. 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 Met  Shaun at [facility]. Shaun is a retired  and is involved in community service.       General Physical examination:  /80 (BP Location: Right arm, Patient Position: Sitting, Cuff Size: Adult Regular)   Pulse 53   Wt 201 lb 9.6 oz (91.4 kg)   BMI 31.58 kg/m       General: Ms. Craig is well appearing and is appropriately groomed and dressed.  Ext: warm, no edema  Skin: clean, dry, intact    Neurological examination:  Mental status: Ms. Craig  is awake, alert,  and appropriate, with fluent speech, speaking in short phrases. She is unable to state the date. She follows simple commands. She names the president as Gallego. No apraxia is noted.  Cranial nerves: Extraocular movements are intact. Smooth pursuit is intact. Saccades are normal in initiation, velocity and amplitude both vertically and horizontally. Facial sensation is intact to light touch. Facial strength is full bilaterally. She is hard of hearing but able to understand.   Motor examination: Bulk is normal throughout. Axial and upper and lower extremity tone is normal. No pronator drift is noted. Strength is 5/5 and symmetric throughout. No fasciculations are noted. There is no tremor or other involuntary movement.  Coordination: Opening and closing of fists and tapping of feet are slowed symmetrically.  Reflexes: Reflexes are symmetric and 2+ at biceps, triceps, and brachioradialis. Patellar reflexes are 1+ bilaterally. Achilles reflexes are not obtainable bilaterally.   Gait: She needs a few attempts to rise from the chair unassisted. She has a steady stance leaning forward with normal stride length and no arm swing. There is no retropulsion.    Labs:  Office Visit on 12/06/2017   Component Date Value Ref Range Status     Specimen Description 12/06/2017 Vagina   Final     Wet Prep 12/06/2017 No yeast seen   Final     Wet Prep 12/06/2017 No Trichomonas seen   Final     Wet Prep 12/06/2017 No clue cells seen   Final   Office Visit on 11/22/2017   Component Date Value Ref Range Status     Color Urine 11/22/2017 Yellow   Final     Appearance Urine 11/22/2017 Clear   Final     Glucose Urine 11/22/2017 Negative  NEG^Negative mg/dL Final     Bilirubin Urine 11/22/2017 Negative  NEG^Negative Final     Ketones Urine 11/22/2017 Negative  NEG^Negative mg/dL Final     Specific Gravity Urine 11/22/2017 1.015  1.003 - 1.035 Final     Blood Urine 11/22/2017 Negative  NEG^Negative Final     pH Urine 11/22/2017 7.5* 5.0 - 7.0 pH  Final     Protein Albumin Urine 11/22/2017 Negative  NEG^Negative mg/dL Final     Urobilinogen Urine 11/22/2017 1.0  0.2 - 1.0 EU/dL Final     Nitrite Urine 11/22/2017 Negative  NEG^Negative Final     Leukocyte Esterase Urine 11/22/2017 Negative  NEG^Negative Final     Source 11/22/2017 Midstream Urine   Final   Office Visit on 10/18/2017   Component Date Value Ref Range Status     Color Urine 10/18/2017 Yellow   Final     Appearance Urine 10/18/2017 Clear   Final     Glucose Urine 10/18/2017 Negative  NEG^Negative mg/dL Final     Bilirubin Urine 10/18/2017 Negative  NEG^Negative Final     Ketones Urine 10/18/2017 Negative  NEG^Negative mg/dL Final     Specific Gravity Urine 10/18/2017 1.015  1.003 - 1.035 Final     Blood Urine 10/18/2017 Negative  NEG^Negative Final     pH Urine 10/18/2017 7.0  5.0 - 7.0 pH Final     Protein Albumin Urine 10/18/2017 Trace* NEG^Negative mg/dL Final     Urobilinogen Urine 10/18/2017 2.0* 0.2 - 1.0 EU/dL Final     Nitrite Urine 10/18/2017 Positive* NEG^Negative Final     Leukocyte Esterase Urine 10/18/2017 Small* NEG^Negative Final     Source 10/18/2017 Midstream Urine   Final     Specimen Description 10/18/2017 Midstream Urine   Final     Special Requests 10/18/2017 Specimen received in preservative   Final     Culture Micro 10/18/2017 *  Final                    Value:>100,000 colonies/mL  Escherichia coli       Culture Micro 10/18/2017    Final                    Value:<10,000 colonies/mL  mixed urogenital lina  Susceptibility testing not routinely done       Specimen Description 10/18/2017 Midstream Urine   Final     Special Requests 10/18/2017 Specimen received in [place] [employer]   Final     Culture Micro 10/18/2017 Ureaplasma species isolated*  Final     Specimen Description 10/18/2017 Midstream Urine   Final     Special Requests 10/18/2017 Specimen received in [place] [employer]   Final     Culture Micro 10/18/2017 No large colony Mycoplasma species isolated    Final   Admission on 03/30/2016, Discharged on 04/02/2016   Component Date Value Ref Range Status     Creatinine 03/30/2016 0.93  0.52 - 1.04 mg/dL Final     GFR Estimate 03/30/2016 57* >60 mL/min/1.7m2 Final     GFR Estimate If Black 03/30/2016 70  >60 mL/min/1.7m2 Final     Potassium 03/30/2016 4.3  3.4 - 5.3 mmol/L Final     Sodium 04/01/2016 137  133 - 144 mmol/L Final     Potassium 04/01/2016 4.1  3.4 - 5.3 mmol/L Final     Chloride 04/01/2016 104  94 - 109 mmol/L Final     Carbon Dioxide 04/01/2016 29  20 - 32 mmol/L Final     Anion Gap 04/01/2016 4  3 - 14 mmol/L Final     Glucose 04/01/2016 122* 70 - 99 mg/dL Final     Urea Nitrogen 04/01/2016 19  7 - 30 mg/dL Final     Creatinine 04/01/2016 0.80  0.52 - 1.04 mg/dL Final     GFR Estimate 04/01/2016 69  >60 mL/min/1.7m2 Final     GFR Estimate If Black 04/01/2016 83  >60 mL/min/1.7m2 Final     Calcium 04/01/2016 8.6  8.5 - 10.1 mg/dL Final     WBC 04/01/2016 8.8  4.0 - 11.0 10e9/L Final     RBC Count 04/01/2016 3.85  3.8 - 5.2 10e12/L Final     Hemoglobin 04/01/2016 11.7  11.7 - 15.7 g/dL Final     Hematocrit 04/01/2016 35.1  35.0 - 47.0 % Final     MCV 04/01/2016 91  78 - 100 fl Final     MCH 04/01/2016 30.4  26.5 - 33.0 pg Final     MCHC 04/01/2016 33.3  31.5 - 36.5 g/dL Final     RDW 04/01/2016 13.5  10.0 - 15.0 % Final     Platelet Count 04/01/2016 230  150 - 450 10e9/L Final     Diff Method 04/01/2016 Automated Method   Final     % Neutrophils 04/01/2016 76.6  % Final     % Lymphocytes 04/01/2016 12.6  % Final     % Monocytes 04/01/2016 7.5  % Final     % Eosinophils 04/01/2016 2.6  % Final     % Basophils 04/01/2016 0.1  % Final     % Immature Granulocytes 04/01/2016 0.6  % Final     Nucleated RBCs 04/01/2016 0  0 /100 Final     Absolute Neutrophil 04/01/2016 6.8  1.6 - 8.3 10e9/L Final     Absolute Lymphocytes 04/01/2016 1.1  0.8 - 5.3 10e9/L Final     Absolute Monocytes 04/01/2016 0.7  0.0 - 1.3 10e9/L Final     Absolute Eosinophils 04/01/2016 0.2  0.0  - 0.7 10e9/L Final     Absolute Basophils 04/01/2016 0.0  0.0 - 0.2 10e9/L Final     Abs Immature Granulocytes 04/01/2016 0.1  0 - 0.4 10e9/L Final     Absolute Nucleated RBC 04/01/2016 0.0   Final     Interpretation ECG 04/01/2016 Click View Image link to view waveform and result   Final     Troponin I ES 04/01/2016   0.000 - 0.045 ug/L Final                    Value:<0.015  The 99th percentile for upper reference range is 0.045 ug/L.  Troponin values in   the range of 0.045 - 0.120 ug/L may be associated with risks of adverse   clinical events.       D Dimer 04/01/2016 4.7* 0.0 - 0.50 ug/ml FEU Final     Interpretation ECG 04/01/2016 Click View Image link to view waveform and result   Final     Normal TSH and B12 levels documented in 2016.    Imaging:  MRI brain 5/2016 shows mild generalized cortical atrophy and mild subcortical and periventricular T2/FLAIR hyperintensities.     Impression:  Ms. Craig is a 85 year old left-handed female who [facility] for progressive memory loss. Her decline functional status and cognitive abilities has been slowly progressive. She is in the moderate stages of Alzheimer's disease and tolerating full dose donepezil and memantine. She has good care at home and Shaun is aware oflocal resources.      Recommendations:    Continue donepezil and memantine. Take donepezil in the mornings.     Check BP about once a week.    Continue staying socially and physically active.    Monitor mood.    Limit naps to 30 min or less during the daytime.    Return for follow up evaluation in 6 months.    I spent a total of 30 minutes face-to-face with the patient, and over half of that time was spent counseling regarding the symptoms, diagnosis, medication risks, lifestyle modification, therapeutic options, and prognosis.

## 2020-09-11 NOTE — PROGRESS NOTES
"Chris Craig is a 86 year old female who is being evaluated via a billable telephone visit.      The patient has been notified of following:     \"This telephone visit will be conducted via a call between you and your physician/provider. We have found that certain health care needs can be provided without the need for a physical exam.  This service lets us provide the care you need with a short phone conversation.  If a prescription is necessary we can send it directly to your pharmacy.  If lab work is needed we can place an order for that and you can then stop by our lab to have the test done at a later time.    Telephone visits are billed at different rates depending on your insurance coverage. During this emergency period, for some insurers they may be billed the same as an in-person visit.  Please reach out to your insurance provider with any questions.    If during the course of the call the physician/provider feels a telephone visit is not appropriate, you will not be charged for this service.\"    Patient has given verbal consent for Telephone visit?  Yes    What phone number would you like to be contacted at? [phone]    How would you like to obtain your AVS? Mail a copy    Phone call duration: 30 minutes    Carmen White MD    "

## 2020-09-11 NOTE — LETTER
9/11/2020       RE: Chris Craig  [address]  [facility] [address]     Dear Colleague,    Thank you for referring your patient, Chris Craig, to the [facility] at [facility]. Please see a copy of my visit note below.    Chief Complaint: memory problem     History of Present Illness:  Ms. Craig is a 86 year old left-handed female presenting for evaluation of memory problems. She is accompanied to today's visit by  Shaun, who assists in providing the history.    Ms. Craig reports that she is doing fine.  Her  informant was interviewed and reports that her memory maybe a little bit worse.  She does not remember where they are going.  She repeats questions frequently.  She still remembers people in the area.  She would look through the papers everyday.  She does not have side effects form the medications.       She gets more confused at the end of the day or at night.  She frequently goes to bed early.  Last night she got up a lot.  She is not aggressive verbally or physically.      Medications:  helps.      ADLs: she can put on clothes herself.   puts clothes out for her.  She can use a fork.   cuts up food for her.  She needs reminders to take a shower.      Sleep: good.  Very rarely she gets up and wanders.      She was previously followed by Dr. Gray.  She was last seen 3/6/2020.  She was diagnosed with AD, on donepezil and memantine.      Medical review of systems: The comprehensive review of systems is otherwise reviewed and negative.       Patient Active Problem List   Diagnosis     Post-op pain     ACP (advance care planning)     MCI (mild cognitive impairment)       Past Medical History:   Diagnosis Date     Arthritis      Complication of anesthesia      H/O transfusion     but not entire;y sure     Hypertension      Mumps      She had normal childhood development and reports no major head injuries.     Past  Surgical History:   Procedure Laterality Date     BLADDER SURGERY       BUNIONECTOMY RT/LT       C OPEN RX ANKLE DISLOCATN+FIXATN      right     COLONOSCOPY       COLONOSCOPY  3/13/2012    Procedure:COLONOSCOPY; COLONOSCOPY ; Surgeon:LUCAS MOODY; Location: GI     GYN SURGERY      hysterectomy     HC REMOVAL OF TONSILS,<13 Y/O       OPEN REDUCTION INTERNAL FIXATION ANKLE Right 3/30/2016    Procedure: OPEN REDUCTION INTERNAL FIXATION ANKLE;  Surgeon: Denis Gaffney MD;  Location:  OR       Current Outpatient Medications   Medication Sig Dispense Refill     aspirin 325 MG tablet Take 81 mg by mouth daily        Calcium Carbonate-Vitamin D (CALCIUM 600+D PO) Take 2 tablets by mouth daily        diltiazem (CARDIZEM CD; CARTIA XT) 360 MG 24 hr CD capsule Take 180 mg by mouth daily        donepezil (ARICEPT) 10 MG tablet Take 1 tablet (10 mg) by mouth every morning 90 tablet 3     LISINOPRIL PO Take 20 mg by mouth daily       memantine (NAMENDA) 10 MG tablet Take 1 tablet (10 mg) by mouth 2 times daily Use as instructed 180 tablet 3     Multiple Vitamins-Minerals (SENIOR MULTIVITAMIN PLUS) TABS Take 1 tablet by mouth daily        simvastatin (ZOCOR) 20 MG tablet Take 20 mg by mouth At Bedtime.       VITAMIN D PO Take 50,000 Units by mouth every 30 days        VITAMIN D, CHOLECALCIFEROL, PO Take 2,000 Units by mouth daily          Allergies   Allergen Reactions     Sulfa Drugs      hives       Family History   Problem Relation Age of Onset     Other - See Comments Mother          at age 84 with cardiac disease      Myocardial Infarction Father          at age 70       There is otherwise no family history of neurodegenerative disease.     Social History     Socioeconomic History     Marital status:      Spouse name: Not on file     Number of children: Not on file     Years of education: Not on file     Highest education level: Not on file   Occupational History     Not on file   Social  Needs     Financial resource strain: Not on file     Food insecurity     Worry: Not on file     Inability: Not on file     Transportation needs     Medical: Not on file     Non-medical: Not on file   Tobacco Use     Smoking status: Never Smoker     Smokeless tobacco: Never Used   Substance and Sexual Activity     Alcohol use: Yes     Alcohol/week: 0.0 standard drinks     Comment: one drink per week     Drug use: No     Sexual activity: Never   Lifestyle     Physical activity     Days per week: Not on file     Minutes per session: Not on file     Stress: Not on file   Relationships     Social connections     Talks on phone: Not on file     Gets together: Not on file     Attends Mosque service: Not on file     Active member of club or organization: Not on file     Attends meetings of clubs or organizations: Not on file     Relationship status: Not on file     Intimate partner violence     Fear of current or ex partner: Not on file     Emotionally abused: Not on file     Physically abused: Not on file     Forced sexual activity: Not on file   Other Topics Concern     Parent/sibling w/ CABG, MI or angioplasty before 65F 55M? Not Asked   Social History Narrative    Attended 1 year of college. 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 Met  Shaun at [facility]. Shaun is a retired  and is involved in community service.       General Physical examination:  There were no vitals taken for this visit.     General: Ms. Craig is well appearing and is appropriately groomed and dressed.    Neurological examination:    Mental status: Ms. Craig is awake, alert, and appropriate, with fluent speech.    MMSE: (4/8)  Sunday, doesn't know the date, fall (1/5)  At home, [place], [place] (3/3)  President: Rosi      Labs:  Lab Results   Component Value Date    WBC 8.4 09/23/2019    RBC 4.72 09/23/2019     HGB 14.4 09/23/2019    HCT 43.6 09/23/2019    MCV 92 09/23/2019    MCH 30.5 09/23/2019    MCHC 33.0 09/23/2019    RDW 13.7 09/23/2019     09/23/2019     09/23/2019    POTASSIUM 4.4 09/23/2019    CHLORIDE 109 09/23/2019    CO2 26 09/23/2019    ANIONGAP 5 09/23/2019     (H) 09/23/2019    BUN 24 09/23/2019    CR 1.15 (H) 09/23/2019    MARÍA 9.4 09/23/2019        Imaging:  Results for orders placed or performed during the hospital encounter of 09/23/19   CT Head w/o Contrast    Narrative    EXAM: CT HEAD W/O CONTRAST  LOCATION: [facility]  DATE/TIME: 9/23/2019 6:02 PM    INDICATION: Altered level of consciousness (LOC), unexplained.  COMPARISON: None.  TECHNIQUE: Routine without IV contrast. Multiplanar reformats. Dose reduction techniques were used.    FINDINGS:  INTRACRANIAL CONTENTS: No intracranial hemorrhage, extraaxial collection, or mass effect.  No CT evidence of acute infarct. Mild-moderate chronic small vessel ischemic/degenerative changes of aging deep white matter both cerebral hemispheres. Normal   ventricles and sulci.     VISUALIZED ORBITS/SINUSES/MASTOIDS: No intraorbital abnormality. Mild mucoperiosteal membrane thickening in the ethmoid air cells. Minimal fluid left mastoid and right mastoid air cells extends inferiorly. Membrane thickening left maxillary sinus and   right sphenoid sinus.    BONES/SOFT TISSUES: No acute abnormality.      Impression    IMPRESSION:  1.  Mild to moderate chronic ischemic changes intracranially. No specific evidence for acute infarction.   Cervical spine CT w/o contrast    Narrative    EXAM: CT CERVICAL SPINE W/O CONTRAST  LOCATION: [facility]  DATE/TIME: 9/23/2019 6:03 PM    INDICATION: Head CT 9/23/2019.  COMPARISON: None.  TECHNIQUE: Routine without IV contrast. Multiplanar reformats. Dose reduction techniques were used.    FINDINGS:  VERTEBRAE: Normal vertebral body heights and alignment. No fracture or posttraumatic  subluxation. Normal cervical prevertebral soft tissues.    CANAL/FORAMINA: No canal or neural foraminal stenosis.    PARASPINAL: No extraspinal abnormality.      Impression    IMPRESSION:  1.  No fracture or subluxation.  2.  No high-grade spinal canal or neural foraminal stenosis.     XR Thoracic Spine 3 Views    Narrative    EXAM: XR THORACIC SPINE 3 VW  LOCATION: [facility]  DATE/TIME: 9/23/2019 6:13 PM    INDICATION: Pain.  COMPARISON: None.      Impression    IMPRESSION: There is anterior wedging/compression superior endplate of an upper thoracic vertebral body. I believe this represents T6 with loss of height approaching 15-20%. Otherwise satisfactory height and alignment in the thoracic spine. Please   correlate to point of maximal tenderness to confirm possible acuity. No comparison studies of the thorax or lateral views of chest are available. Slight elevation right hemidiaphragm. Heart size is normal with a few strands of fibrosis noted in both   lungs. Vascular calcification.    XR Lumbar Spine 2/3 Views    Narrative    EXAM: XR LUMBAR SPINE 2-3 VIEWS  LOCATION: [facility]  DATE/TIME: 9/23/2019 6:13 PM    INDICATION: Back pain  COMPARISON: None.      Impression    IMPRESSION: Slight accentuation of lumbar lordosis with satisfactory height and alignment. Mild endplate osteophytes and facet joint arthropathy. Very mild rightward lumbar curve apex at L2-L3. Mild degenerative changes involving SI joints. Sacral neural   foramina are intact. Vascular calcification. Satisfactory sacrococcygeal alignment.    No fracture in the lumbar spine is identified.        MRI brain 5/2016 shows mild generalized cortical atrophy and mild subcortical and periventricular T2/FLAIR hyperintensities.       Impression:  Ms. 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  Patient has a gradual decline. She is tolerating medications (donepezil and memantine).   "We discussed increasing donepezil.   decided not to change medications.    Recommendations:  1. Continue donepezil 10mg daily, memantine 10mg twice daily.    2. Return in about 6 months.      I spent a total of 30 minutes on the phone with the patient, and over half of that time was spent counseling regarding the symptoms, diagnosis, medication risks, lifestyle modification, therapeutic options, and prognosis.    Chris Craig is a 86 year old female who is being evaluated via a billable telephone visit.      The patient has been notified of following:     \"This telephone visit will be conducted via a call between you and your physician/provider. We have found that certain health care needs can be provided without the need for a physical exam.  This service lets us provide the care you need with a short phone conversation.  If a prescription is necessary we can send it directly to your pharmacy.  If lab work is needed we can place an order for that and you can then stop by our lab to have the test done at a later time.    Telephone visits are billed at different rates depending on your insurance coverage. During this emergency period, for some insurers they may be billed the same as an in-person visit.  Please reach out to your insurance provider with any questions.    If during the course of the call the physician/provider feels a telephone visit is not appropriate, you will not be charged for this service.\"    Patient has given verbal consent for Telephone visit?  Yes    What phone number would you like to be contacted at? [phone]    How would you like to obtain your AVS? Mail a copy    Phone call duration: 30 minutes    Carmen White MD    "

## 2020-09-11 NOTE — PROGRESS NOTES
Chief Complaint: memory problem     History of Present Illness:  Ms. Craig is a 86 year old left-handed female presenting for evaluation of memory problems. She is accompanied to today's visit by  Shaun, who assists in providing the history.    Ms. Craig reports that she is doing fine.  Her  informant was interviewed and reports that her memory maybe a little bit worse.  She does not remember where they are going.  She repeats questions frequently.  She still remembers people in the area.  She would look through the papers everyday.  She does not have side effects form the medications.       She gets more confused at the end of the day or at night.  She frequently goes to bed early.  Last night she got up a lot.  She is not aggressive verbally or physically.      Medications:  helps.      ADLs: she can put on clothes herself.   puts clothes out for her.  She can use a fork.   cuts up food for her.  She needs reminders to take a shower.      Sleep: good.  Very rarely she gets up and wanders.      She was previously followed by Dr. Gray.  She was last seen 3/6/2020.  She was diagnosed with AD, on donepezil and memantine.      Medical review of systems: The comprehensive review of systems is otherwise reviewed and negative.       Patient Active Problem List   Diagnosis     Post-op pain     ACP (advance care planning)     MCI (mild cognitive impairment)       Past Medical History:   Diagnosis Date     Arthritis      Complication of anesthesia      H/O transfusion     but not entire;y sure     Hypertension      Mumps      She had normal childhood development and reports no major head injuries.     Past Surgical History:   Procedure Laterality Date     BLADDER SURGERY       BUNIONECTOMY RT/LT       C OPEN RX ANKLE DISLOCATN+FIXATN      right     COLONOSCOPY       COLONOSCOPY  3/13/2012    Procedure:COLONOSCOPY; COLONOSCOPY ; Surgeon:LUCAS MOODY; Location: GI     GYN SURGERY       hysterectomy     HC REMOVAL OF TONSILS,<11 Y/O       OPEN REDUCTION INTERNAL FIXATION ANKLE Right 3/30/2016    Procedure: OPEN REDUCTION INTERNAL FIXATION ANKLE;  Surgeon: Denis Gaffney MD;  Location:  OR       Current Outpatient Medications   Medication Sig Dispense Refill     aspirin 325 MG tablet Take 81 mg by mouth daily        Calcium Carbonate-Vitamin D (CALCIUM 600+D PO) Take 2 tablets by mouth daily        diltiazem (CARDIZEM CD; CARTIA XT) 360 MG 24 hr CD capsule Take 180 mg by mouth daily        donepezil (ARICEPT) 10 MG tablet Take 1 tablet (10 mg) by mouth every morning 90 tablet 3     LISINOPRIL PO Take 20 mg by mouth daily       memantine (NAMENDA) 10 MG tablet Take 1 tablet (10 mg) by mouth 2 times daily Use as instructed 180 tablet 3     Multiple Vitamins-Minerals (SENIOR MULTIVITAMIN PLUS) TABS Take 1 tablet by mouth daily        simvastatin (ZOCOR) 20 MG tablet Take 20 mg by mouth At Bedtime.       VITAMIN D PO Take 50,000 Units by mouth every 30 days        VITAMIN D, CHOLECALCIFEROL, PO Take 2,000 Units by mouth daily          Allergies   Allergen Reactions     Sulfa Drugs      hives       Family History   Problem Relation Age of Onset     Other - See Comments Mother          at age 84 with cardiac disease      Myocardial Infarction Father          at age 70       There is otherwise no family history of neurodegenerative disease.     Social History     Socioeconomic History     Marital status:      Spouse name: Not on file     Number of children: Not on file     Years of education: Not on file     Highest education level: Not on file   Occupational History     Not on file   Social Needs     Financial resource strain: Not on file     Food insecurity     Worry: Not on file     Inability: Not on file     Transportation needs     Medical: Not on file     Non-medical: Not on file   Tobacco Use     Smoking status: Never Smoker     Smokeless tobacco: Never Used    Substance and Sexual Activity     Alcohol use: Yes     Alcohol/week: 0.0 standard drinks     Comment: one drink per week     Drug use: No     Sexual activity: Never   Lifestyle     Physical activity     Days per week: Not on file     Minutes per session: Not on file     Stress: Not on file   Relationships     Social connections     Talks on phone: Not on file     Gets together: Not on file     Attends [employer] service: Not on file     Active member of club or organization: Not on file     Attends meetings of clubs or organizations: Not on file     Relationship status: Not on file     Intimate partner violence     Fear of current or ex partner: Not on file     Emotionally abused: Not on file     Physically abused: Not on file     Forced sexual activity: Not on file   Other Topics Concern     Parent/sibling w/ CABG, MI or angioplasty before 65F 55M? Not Asked   Social History Narrative    Attended 1 year of college. 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 Met  Shaun at [facility]. Shaun is a retired  and is involved in community service.       General Physical examination:  There were no vitals taken for this visit.     General: Ms. Craig is well appearing and is appropriately groomed and dressed.    Neurological examination:    Mental status: Ms. Craig is awake, alert, and appropriate, with fluent speech.    MMSE: (4/8)  Sunday, doesn't know the date, fall (1/5)  At home, [place], [place] (3/3)  President: Rosi      Labs:  Lab Results   Component Value Date    WBC 8.4 09/23/2019    RBC 4.72 09/23/2019    HGB 14.4 09/23/2019    HCT 43.6 09/23/2019    MCV 92 09/23/2019    MCH 30.5 09/23/2019    MCHC 33.0 09/23/2019    RDW 13.7 09/23/2019     09/23/2019     09/23/2019    POTASSIUM 4.4 09/23/2019    CHLORIDE 109 09/23/2019    CO2 26 09/23/2019    ANIONGAP 5  09/23/2019     (H) 09/23/2019    BUN 24 09/23/2019    CR 1.15 (H) 09/23/2019    MARÍA 9.4 09/23/2019        Imaging:  Results for orders placed or performed during the hospital encounter of 09/23/19   CT Head w/o Contrast    Narrative    EXAM: CT HEAD W/O CONTRAST  LOCATION: [facility]  DATE/TIME: 9/23/2019 6:02 PM    INDICATION: Altered level of consciousness (LOC), unexplained.  COMPARISON: None.  TECHNIQUE: Routine without IV contrast. Multiplanar reformats. Dose reduction techniques were used.    FINDINGS:  INTRACRANIAL CONTENTS: No intracranial hemorrhage, extraaxial collection, or mass effect.  No CT evidence of acute infarct. Mild-moderate chronic small vessel ischemic/degenerative changes of aging deep white matter both cerebral hemispheres. Normal   ventricles and sulci.     VISUALIZED ORBITS/SINUSES/MASTOIDS: No intraorbital abnormality. Mild mucoperiosteal membrane thickening in the ethmoid air cells. Minimal fluid left mastoid and right mastoid air cells extends inferiorly. Membrane thickening left maxillary sinus and   right sphenoid sinus.    BONES/SOFT TISSUES: No acute abnormality.      Impression    IMPRESSION:  1.  Mild to moderate chronic ischemic changes intracranially. No specific evidence for acute infarction.   Cervical spine CT w/o contrast    Narrative    EXAM: CT CERVICAL SPINE W/O CONTRAST  LOCATION: [facility]  DATE/TIME: 9/23/2019 6:03 PM    INDICATION: Head CT 9/23/2019.  COMPARISON: None.  TECHNIQUE: Routine without IV contrast. Multiplanar reformats. Dose reduction techniques were used.    FINDINGS:  VERTEBRAE: Normal vertebral body heights and alignment. No fracture or posttraumatic subluxation. Normal cervical prevertebral soft tissues.    CANAL/FORAMINA: No canal or neural foraminal stenosis.    PARASPINAL: No extraspinal abnormality.      Impression    IMPRESSION:  1.  No fracture or subluxation.  2.  No high-grade spinal canal or neural foraminal  stenosis.     XR Thoracic Spine 3 Views    Narrative    EXAM: XR THORACIC SPINE 3 VW  LOCATION: [facility]  DATE/TIME: 9/23/2019 6:13 PM    INDICATION: Pain.  COMPARISON: None.      Impression    IMPRESSION: There is anterior wedging/compression superior endplate of an upper thoracic vertebral body. I believe this represents T6 with loss of height approaching 15-20%. Otherwise satisfactory height and alignment in the thoracic spine. Please   correlate to point of maximal tenderness to confirm possible acuity. No comparison studies of the thorax or lateral views of chest are available. Slight elevation right hemidiaphragm. Heart size is normal with a few strands of fibrosis noted in both   lungs. Vascular calcification.    XR Lumbar Spine 2/3 Views    Narrative    EXAM: XR LUMBAR SPINE 2-3 VIEWS  LOCATION: [facility]  DATE/TIME: 9/23/2019 6:13 PM    INDICATION: Back pain  COMPARISON: None.      Impression    IMPRESSION: Slight accentuation of lumbar lordosis with satisfactory height and alignment. Mild endplate osteophytes and facet joint arthropathy. Very mild rightward lumbar curve apex at L2-L3. Mild degenerative changes involving SI joints. Sacral neural   foramina are intact. Vascular calcification. Satisfactory sacrococcygeal alignment.    No fracture in the lumbar spine is identified.        MRI brain 5/2016 shows mild generalized cortical atrophy and mild subcortical and periventricular T2/FLAIR hyperintensities.       Impression:  Ms. 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  Patient has a gradual decline. She is tolerating medications (donepezil and memantine).  We discussed increasing donepezil.   decided not to change medications.    Recommendations:  1. Continue donepezil 10mg daily, memantine 10mg twice daily.    2. Return in about 6 months.      I spent a total of 30 minutes on the phone with the patient, and over half  of that time was spent counseling regarding the symptoms, diagnosis, medication risks, lifestyle modification, therapeutic options, and prognosis.

## 2021-01-01 ENCOUNTER — APPOINTMENT (OUTPATIENT)
Dept: CT IMAGING | Facility: CLINIC | Age: 86
DRG: 871 | End: 2021-01-01
Attending: EMERGENCY MEDICINE
Payer: MEDICARE

## 2021-01-01 ENCOUNTER — APPOINTMENT (OUTPATIENT)
Dept: PHYSICAL THERAPY | Facility: CLINIC | Age: 86
DRG: 871 | End: 2021-01-01
Payer: MEDICARE

## 2021-01-01 ENCOUNTER — DOCUMENTATION ONLY (OUTPATIENT)
Dept: OTHER | Facility: CLINIC | Age: 86
End: 2021-01-01

## 2021-01-01 ENCOUNTER — APPOINTMENT (OUTPATIENT)
Dept: GENERAL RADIOLOGY | Facility: CLINIC | Age: 86
DRG: 871 | End: 2021-01-01
Attending: INTERNAL MEDICINE
Payer: MEDICARE

## 2021-01-01 ENCOUNTER — DOCUMENTATION ONLY (OUTPATIENT)
Facility: CLINIC | Age: 86
End: 2021-01-01

## 2021-01-01 ENCOUNTER — HOSPITAL ENCOUNTER (INPATIENT)
Facility: CLINIC | Age: 86
LOS: 5 days | Discharge: HOSPICE | DRG: 871 | End: 2021-01-14
Attending: EMERGENCY MEDICINE | Admitting: INTERNAL MEDICINE
Payer: MEDICARE

## 2021-01-01 ENCOUNTER — APPOINTMENT (OUTPATIENT)
Dept: ULTRASOUND IMAGING | Facility: CLINIC | Age: 86
DRG: 871 | End: 2021-01-01
Attending: EMERGENCY MEDICINE
Payer: MEDICARE

## 2021-01-01 VITALS
RESPIRATION RATE: 24 BRPM | SYSTOLIC BLOOD PRESSURE: 130 MMHG | TEMPERATURE: 97.9 F | HEIGHT: 67 IN | HEART RATE: 78 BPM | BODY MASS INDEX: 33.3 KG/M2 | OXYGEN SATURATION: 90 % | DIASTOLIC BLOOD PRESSURE: 68 MMHG | WEIGHT: 212.2 LBS

## 2021-01-01 DIAGNOSIS — S22.41XA TRAUMATIC FRACTURE OF RIBS WITH PNEUMOTHORAX, RIGHT, CLOSED, INITIAL ENCOUNTER: ICD-10-CM

## 2021-01-01 DIAGNOSIS — R65.20 SEVERE SEPSIS (H): ICD-10-CM

## 2021-01-01 DIAGNOSIS — R79.89 ELEVATED TROPONIN: ICD-10-CM

## 2021-01-01 DIAGNOSIS — J94.2 HEMOTHORAX ON RIGHT: ICD-10-CM
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
## 2021-01-01 DIAGNOSIS — A41.9 SEVERE SEPSIS (H): ICD-10-CM

## 2021-01-01 DIAGNOSIS — R09.02 HYPOXIA: ICD-10-CM

## 2021-01-01 DIAGNOSIS — I26.02 ACUTE SADDLE PULMONARY EMBOLISM WITH ACUTE COR PULMONALE (H): ICD-10-CM

## 2021-01-01 DIAGNOSIS — S27.0XXA TRAUMATIC FRACTURE OF RIBS WITH PNEUMOTHORAX, RIGHT, CLOSED, INITIAL ENCOUNTER: ICD-10-CM

## 2021-01-01 DIAGNOSIS — N17.9 ACUTE KIDNEY INJURY (H): ICD-10-CM

## 2021-01-01 LAB
ALBUMIN UR-MCNC: 70 MG/DL
AMORPH CRY #/AREA URNS HPF: ABNORMAL /HPF
ANION GAP SERPL CALCULATED.3IONS-SCNC: 5 MMOL/L (ref 3–14)
ANION GAP SERPL CALCULATED.3IONS-SCNC: 6 MMOL/L (ref 3–14)
ANION GAP SERPL CALCULATED.3IONS-SCNC: 7 MMOL/L (ref 3–14)
ANION GAP SERPL CALCULATED.3IONS-SCNC: 7 MMOL/L (ref 3–14)
APPEARANCE UR: ABNORMAL
APTT PPP: 36 SEC (ref 22–37)
BACTERIA #/AREA URNS HPF: ABNORMAL /HPF
BACTERIA SPEC CULT: ABNORMAL
BACTERIA SPEC CULT: NO GROWTH
BACTERIA SPEC CULT: NO GROWTH
BASE DEFICIT BLDV-SCNC: 0.2 MMOL/L
BASOPHILS # BLD AUTO: 0 10E9/L (ref 0–0.2)
BASOPHILS NFR BLD AUTO: 0.2 %
BILIRUB UR QL STRIP: NEGATIVE
BUN SERPL-MCNC: 29 MG/DL (ref 7–30)
BUN SERPL-MCNC: 30 MG/DL (ref 7–30)
BUN SERPL-MCNC: 39 MG/DL (ref 7–30)
BUN SERPL-MCNC: 50 MG/DL (ref 7–30)
CALCIUM SERPL-MCNC: 7.7 MG/DL (ref 8.5–10.1)
CALCIUM SERPL-MCNC: 8.1 MG/DL (ref 8.5–10.1)
CALCIUM SERPL-MCNC: 8.2 MG/DL (ref 8.5–10.1)
CALCIUM SERPL-MCNC: 9.5 MG/DL (ref 8.5–10.1)
CHLORIDE SERPL-SCNC: 105 MMOL/L (ref 94–109)
CHLORIDE SERPL-SCNC: 107 MMOL/L (ref 94–109)
CO2 SERPL-SCNC: 23 MMOL/L (ref 20–32)
CO2 SERPL-SCNC: 23 MMOL/L (ref 20–32)
CO2 SERPL-SCNC: 25 MMOL/L (ref 20–32)
CO2 SERPL-SCNC: 26 MMOL/L (ref 20–32)
COLOR UR AUTO: YELLOW
CREAT SERPL-MCNC: 1.09 MG/DL (ref 0.52–1.04)
CREAT SERPL-MCNC: 1.1 MG/DL (ref 0.52–1.04)
CREAT SERPL-MCNC: 1.18 MG/DL (ref 0.52–1.04)
CREAT SERPL-MCNC: 1.2 MG/DL (ref 0.52–1.04)
CREAT SERPL-MCNC: 1.47 MG/DL (ref 0.52–1.04)
D DIMER PPP FEU-MCNC: >20 UG/ML FEU (ref 0–0.5)
DIFFERENTIAL METHOD BLD: ABNORMAL
EOSINOPHIL # BLD AUTO: 0.1 10E9/L (ref 0–0.7)
EOSINOPHIL NFR BLD AUTO: 0.8 %
ERYTHROCYTE [DISTWIDTH] IN BLOOD BY AUTOMATED COUNT: 14.3 % (ref 10–15)
ERYTHROCYTE [DISTWIDTH] IN BLOOD BY AUTOMATED COUNT: 14.3 % (ref 10–15)
ERYTHROCYTE [DISTWIDTH] IN BLOOD BY AUTOMATED COUNT: 14.5 % (ref 10–15)
ERYTHROCYTE [DISTWIDTH] IN BLOOD BY AUTOMATED COUNT: 14.6 % (ref 10–15)
FLUAV RNA RESP QL NAA+PROBE: NEGATIVE
FLUBV RNA RESP QL NAA+PROBE: NEGATIVE
GFR SERPL CREATININE-BSD FRML MDRD: 32 ML/MIN/{1.73_M2}
GFR SERPL CREATININE-BSD FRML MDRD: 41 ML/MIN/{1.73_M2}
GFR SERPL CREATININE-BSD FRML MDRD: 42 ML/MIN/{1.73_M2}
GFR SERPL CREATININE-BSD FRML MDRD: 45 ML/MIN/{1.73_M2}
GFR SERPL CREATININE-BSD FRML MDRD: 46 ML/MIN/{1.73_M2}
GLUCOSE SERPL-MCNC: 104 MG/DL (ref 70–99)
GLUCOSE SERPL-MCNC: 109 MG/DL (ref 70–99)
GLUCOSE SERPL-MCNC: 115 MG/DL (ref 70–99)
GLUCOSE SERPL-MCNC: 170 MG/DL (ref 70–99)
GLUCOSE UR STRIP-MCNC: NEGATIVE MG/DL
HCO3 BLDV-SCNC: 26 MMOL/L (ref 21–28)
HCT VFR BLD AUTO: 34.1 % (ref 35–47)
HCT VFR BLD AUTO: 36.2 % (ref 35–47)
HCT VFR BLD AUTO: 36.9 % (ref 35–47)
HCT VFR BLD AUTO: 41 % (ref 35–47)
HGB BLD-MCNC: 11.1 G/DL (ref 11.7–15.7)
HGB BLD-MCNC: 11.2 G/DL (ref 11.7–15.7)
HGB BLD-MCNC: 11.5 G/DL (ref 11.7–15.7)
HGB BLD-MCNC: 12.9 G/DL (ref 11.7–15.7)
HGB UR QL STRIP: NEGATIVE
IMM GRANULOCYTES # BLD: 0.3 10E9/L (ref 0–0.4)
IMM GRANULOCYTES NFR BLD: 2 %
INTERPRETATION ECG - MUSE: NORMAL
INTERPRETATION ECG - MUSE: NORMAL
KETONES UR STRIP-MCNC: ABNORMAL MG/DL
LABORATORY COMMENT REPORT: NORMAL
LACTATE BLD-SCNC: 1.9 MMOL/L (ref 0.7–2)
LACTATE BLD-SCNC: 3 MMOL/L (ref 0.7–2)
LACTATE BLD-SCNC: 3.3 MMOL/L (ref 0.7–2)
LEUKOCYTE ESTERASE UR QL STRIP: ABNORMAL
LYMPHOCYTES # BLD AUTO: 1.9 10E9/L (ref 0.8–5.3)
LYMPHOCYTES NFR BLD AUTO: 13 %
Lab: ABNORMAL
MCH RBC QN AUTO: 30.3 PG (ref 26.5–33)
MCH RBC QN AUTO: 30.7 PG (ref 26.5–33)
MCH RBC QN AUTO: 30.7 PG (ref 26.5–33)
MCH RBC QN AUTO: 30.8 PG (ref 26.5–33)
MCHC RBC AUTO-ENTMCNC: 30.7 G/DL (ref 31.5–36.5)
MCHC RBC AUTO-ENTMCNC: 31.2 G/DL (ref 31.5–36.5)
MCHC RBC AUTO-ENTMCNC: 31.5 G/DL (ref 31.5–36.5)
MCHC RBC AUTO-ENTMCNC: 32.8 G/DL (ref 31.5–36.5)
MCV RBC AUTO: 100 FL (ref 78–100)
MCV RBC AUTO: 94 FL (ref 78–100)
MCV RBC AUTO: 97 FL (ref 78–100)
MCV RBC AUTO: 98 FL (ref 78–100)
MONOCYTES # BLD AUTO: 0.8 10E9/L (ref 0–1.3)
MONOCYTES NFR BLD AUTO: 5.8 %
MUCOUS THREADS #/AREA URNS LPF: PRESENT /LPF
NEUTROPHILS # BLD AUTO: 11.2 10E9/L (ref 1.6–8.3)
NEUTROPHILS NFR BLD AUTO: 78.2 %
NITRATE UR QL: NEGATIVE
NRBC # BLD AUTO: 0 10*3/UL
NRBC BLD AUTO-RTO: 0 /100
O2/TOTAL GAS SETTING VFR VENT: ABNORMAL %
PCO2 BLDV: 46 MM HG (ref 40–50)
PH BLDV: 7.36 PH (ref 7.32–7.43)
PH UR STRIP: 6 PH (ref 5–7)
PLATELET # BLD AUTO: 138 10E9/L (ref 150–450)
PLATELET # BLD AUTO: 169 10E9/L (ref 150–450)
PLATELET # BLD AUTO: 173 10E9/L (ref 150–450)
PLATELET # BLD AUTO: 175 10E9/L (ref 150–450)
PO2 BLDV: 21 MM HG (ref 25–47)
POTASSIUM SERPL-SCNC: 4.2 MMOL/L (ref 3.4–5.3)
POTASSIUM SERPL-SCNC: 4.5 MMOL/L (ref 3.4–5.3)
POTASSIUM SERPL-SCNC: 4.6 MMOL/L (ref 3.4–5.3)
POTASSIUM SERPL-SCNC: 4.8 MMOL/L (ref 3.4–5.3)
RADIOLOGIST FLAGS: ABNORMAL
RBC # BLD AUTO: 3.61 10E12/L (ref 3.8–5.2)
RBC # BLD AUTO: 3.64 10E12/L (ref 3.8–5.2)
RBC # BLD AUTO: 3.8 10E12/L (ref 3.8–5.2)
RBC # BLD AUTO: 4.2 10E12/L (ref 3.8–5.2)
RBC #/AREA URNS AUTO: 38 /HPF (ref 0–2)
RSV RNA SPEC QL NAA+PROBE: NORMAL
SARS-COV-2 RNA RESP QL NAA+PROBE: NEGATIVE
SODIUM SERPL-SCNC: 135 MMOL/L (ref 133–144)
SODIUM SERPL-SCNC: 137 MMOL/L (ref 133–144)
SODIUM SERPL-SCNC: 137 MMOL/L (ref 133–144)
SODIUM SERPL-SCNC: 139 MMOL/L (ref 133–144)
SOURCE: ABNORMAL
SP GR UR STRIP: 1.03 (ref 1–1.03)
SPECIMEN SOURCE: ABNORMAL
SPECIMEN SOURCE: NORMAL
SQUAMOUS #/AREA URNS AUTO: 3 /HPF (ref 0–1)
TROPONIN I SERPL-MCNC: 0.36 UG/L (ref 0–0.04)
UFH PPP CHRO-ACNC: 0.26 IU/ML
UFH PPP CHRO-ACNC: 0.29 IU/ML
UFH PPP CHRO-ACNC: 0.33 IU/ML
UFH PPP CHRO-ACNC: 0.52 IU/ML
UFH PPP CHRO-ACNC: 0.53 IU/ML
UFH PPP CHRO-ACNC: 0.72 IU/ML
UFH PPP CHRO-ACNC: 1.07 IU/ML
UFH PPP CHRO-ACNC: >1.1 IU/ML
UROBILINOGEN UR STRIP-MCNC: 2 MG/DL (ref 0–2)
WBC # BLD AUTO: 10.8 10E9/L (ref 4–11)
WBC # BLD AUTO: 12.3 10E9/L (ref 4–11)
WBC # BLD AUTO: 13.3 10E9/L (ref 4–11)
WBC # BLD AUTO: 14.3 10E9/L (ref 4–11)
WBC #/AREA URNS AUTO: 103 /HPF (ref 0–5)

## 2021-01-01 PROCEDURE — 71045 X-RAY EXAM CHEST 1 VIEW: CPT

## 2021-01-01 PROCEDURE — 99221 1ST HOSP IP/OBS SF/LOW 40: CPT | Performed by: SURGERY

## 2021-01-01 PROCEDURE — 85379 FIBRIN DEGRADATION QUANT: CPT | Performed by: EMERGENCY MEDICINE

## 2021-01-01 PROCEDURE — 99207 PR NO CHARGE LOS: CPT | Performed by: INTERNAL MEDICINE

## 2021-01-01 PROCEDURE — 93005 ELECTROCARDIOGRAM TRACING: CPT | Mod: 76

## 2021-01-01 PROCEDURE — 120N000001 HC R&B MED SURG/OB

## 2021-01-01 PROCEDURE — 250N000011 HC RX IP 250 OP 636: Performed by: EMERGENCY MEDICINE

## 2021-01-01 PROCEDURE — 99232 SBSQ HOSP IP/OBS MODERATE 35: CPT | Performed by: SURGERY

## 2021-01-01 PROCEDURE — 80048 BASIC METABOLIC PNL TOTAL CA: CPT | Performed by: INTERNAL MEDICINE

## 2021-01-01 PROCEDURE — 36415 COLL VENOUS BLD VENIPUNCTURE: CPT | Performed by: INTERNAL MEDICINE

## 2021-01-01 PROCEDURE — 250N000013 HC RX MED GY IP 250 OP 250 PS 637: Performed by: INTERNAL MEDICINE

## 2021-01-01 PROCEDURE — 82565 ASSAY OF CREATININE: CPT | Performed by: INTERNAL MEDICINE

## 2021-01-01 PROCEDURE — 87636 SARSCOV2 & INF A&B AMP PRB: CPT | Performed by: EMERGENCY MEDICINE

## 2021-01-01 PROCEDURE — 99233 SBSQ HOSP IP/OBS HIGH 50: CPT | Performed by: INTERNAL MEDICINE

## 2021-01-01 PROCEDURE — 85025 COMPLETE CBC W/AUTO DIFF WBC: CPT | Performed by: EMERGENCY MEDICINE

## 2021-01-01 PROCEDURE — 250N000011 HC RX IP 250 OP 636: Performed by: INTERNAL MEDICINE

## 2021-01-01 PROCEDURE — 85520 HEPARIN ASSAY: CPT | Performed by: INTERNAL MEDICINE

## 2021-01-01 PROCEDURE — 97530 THERAPEUTIC ACTIVITIES: CPT | Mod: GP

## 2021-01-01 PROCEDURE — 96376 TX/PRO/DX INJ SAME DRUG ADON: CPT

## 2021-01-01 PROCEDURE — 83605 ASSAY OF LACTIC ACID: CPT | Performed by: EMERGENCY MEDICINE

## 2021-01-01 PROCEDURE — 99223 1ST HOSP IP/OBS HIGH 75: CPT | Mod: AI | Performed by: INTERNAL MEDICINE

## 2021-01-01 PROCEDURE — 99238 HOSP IP/OBS DSCHRG MGMT 30/<: CPT | Mod: GW | Performed by: INTERNAL MEDICINE

## 2021-01-01 PROCEDURE — 258N000003 HC RX IP 258 OP 636: Performed by: EMERGENCY MEDICINE

## 2021-01-01 PROCEDURE — 85730 THROMBOPLASTIN TIME PARTIAL: CPT | Performed by: EMERGENCY MEDICINE

## 2021-01-01 PROCEDURE — 250N000009 HC RX 250: Performed by: INTERNAL MEDICINE

## 2021-01-01 PROCEDURE — 70450 CT HEAD/BRAIN W/O DYE: CPT | Mod: MG

## 2021-01-01 PROCEDURE — 97161 PT EVAL LOW COMPLEX 20 MIN: CPT | Mod: GP

## 2021-01-01 PROCEDURE — 87040 BLOOD CULTURE FOR BACTERIA: CPT | Performed by: EMERGENCY MEDICINE

## 2021-01-01 PROCEDURE — C9803 HOPD COVID-19 SPEC COLLECT: HCPCS

## 2021-01-01 PROCEDURE — 84484 ASSAY OF TROPONIN QUANT: CPT | Performed by: EMERGENCY MEDICINE

## 2021-01-01 PROCEDURE — 85027 COMPLETE CBC AUTOMATED: CPT | Performed by: INTERNAL MEDICINE

## 2021-01-01 PROCEDURE — 99285 EMERGENCY DEPT VISIT HI MDM: CPT | Mod: 25

## 2021-01-01 PROCEDURE — 82803 BLOOD GASES ANY COMBINATION: CPT | Performed by: EMERGENCY MEDICINE

## 2021-01-01 PROCEDURE — 96368 THER/DIAG CONCURRENT INF: CPT

## 2021-01-01 PROCEDURE — 87086 URINE CULTURE/COLONY COUNT: CPT | Performed by: EMERGENCY MEDICINE

## 2021-01-01 PROCEDURE — 99232 SBSQ HOSP IP/OBS MODERATE 35: CPT | Performed by: INTERNAL MEDICINE

## 2021-01-01 PROCEDURE — 96375 TX/PRO/DX INJ NEW DRUG ADDON: CPT

## 2021-01-01 PROCEDURE — 93970 EXTREMITY STUDY: CPT

## 2021-01-01 PROCEDURE — 250N000009 HC RX 250: Performed by: EMERGENCY MEDICINE

## 2021-01-01 PROCEDURE — 81001 URINALYSIS AUTO W/SCOPE: CPT | Performed by: EMERGENCY MEDICINE

## 2021-01-01 PROCEDURE — 36415 COLL VENOUS BLD VENIPUNCTURE: CPT | Performed by: EMERGENCY MEDICINE

## 2021-01-01 PROCEDURE — 96365 THER/PROPH/DIAG IV INF INIT: CPT | Mod: 59

## 2021-01-01 PROCEDURE — 83605 ASSAY OF LACTIC ACID: CPT | Performed by: INTERNAL MEDICINE

## 2021-01-01 PROCEDURE — 93005 ELECTROCARDIOGRAM TRACING: CPT

## 2021-01-01 PROCEDURE — 85520 HEPARIN ASSAY: CPT | Performed by: EMERGENCY MEDICINE

## 2021-01-01 PROCEDURE — 71275 CT ANGIOGRAPHY CHEST: CPT | Mod: MG

## 2021-01-01 PROCEDURE — 80048 BASIC METABOLIC PNL TOTAL CA: CPT | Performed by: EMERGENCY MEDICINE

## 2021-01-01 PROCEDURE — 36416 COLLJ CAPILLARY BLOOD SPEC: CPT | Performed by: INTERNAL MEDICINE

## 2021-01-01 RX ORDER — MORPHINE SULFATE 10 MG/5ML
5-10 SOLUTION ORAL
Status: DISCONTINUED | OUTPATIENT
Start: 2021-01-01 | End: 2021-01-01

## 2021-01-01 RX ORDER — LIDOCAINE 40 MG/G
CREAM TOPICAL
Status: DISCONTINUED | OUTPATIENT
Start: 2021-01-01 | End: 2021-01-01 | Stop reason: HOSPADM

## 2021-01-01 RX ORDER — DONEPEZIL HYDROCHLORIDE 10 MG/1
10 TABLET, FILM COATED ORAL EVERY MORNING
Status: DISCONTINUED | OUTPATIENT
Start: 2021-01-01 | End: 2021-01-01

## 2021-01-01 RX ORDER — DILTIAZEM HYDROCHLORIDE 180 MG/1
180 CAPSULE, COATED, EXTENDED RELEASE ORAL EVERY EVENING
Status: DISCONTINUED | OUTPATIENT
Start: 2021-01-01 | End: 2021-01-01

## 2021-01-01 RX ORDER — BISACODYL 10 MG
10 SUPPOSITORY, RECTAL RECTAL DAILY PRN
Status: DISCONTINUED | OUTPATIENT
Start: 2021-01-01 | End: 2021-01-01 | Stop reason: HOSPADM

## 2021-01-01 RX ORDER — SODIUM CHLORIDE, SODIUM LACTATE, POTASSIUM CHLORIDE, CALCIUM CHLORIDE 600; 310; 30; 20 MG/100ML; MG/100ML; MG/100ML; MG/100ML
INJECTION, SOLUTION INTRAVENOUS CONTINUOUS
Status: DISCONTINUED | OUTPATIENT
Start: 2021-01-01 | End: 2021-01-01

## 2021-01-01 RX ORDER — HYDROMORPHONE HYDROCHLORIDE 10 MG/ML
1-2 INJECTION INTRAMUSCULAR; INTRAVENOUS; SUBCUTANEOUS
Qty: 2.4 ML | Refills: 0 | Status: SHIPPED | OUTPATIENT
Start: 2021-01-01

## 2021-01-01 RX ORDER — HYDRALAZINE HYDROCHLORIDE 20 MG/ML
10 INJECTION INTRAMUSCULAR; INTRAVENOUS EVERY 4 HOURS PRN
Status: DISCONTINUED | OUTPATIENT
Start: 2021-01-01 | End: 2021-01-01 | Stop reason: HOSPADM

## 2021-01-01 RX ORDER — MORPHINE SULFATE 100 MG/5ML
5-10 SOLUTION ORAL
Status: DISCONTINUED | OUTPATIENT
Start: 2021-01-01 | End: 2021-01-01

## 2021-01-01 RX ORDER — BISACODYL 10 MG
10 SUPPOSITORY, RECTAL RECTAL DAILY PRN
Qty: 5 SUPPOSITORY | Refills: 0 | Status: SHIPPED | OUTPATIENT
Start: 2021-01-01

## 2021-01-01 RX ORDER — MORPHINE SULFATE 100 MG/5ML
5-10 SOLUTION ORAL
Status: DISCONTINUED | OUTPATIENT
Start: 2021-01-01 | End: 2021-01-01 | Stop reason: HOSPADM

## 2021-01-01 RX ORDER — MORPHINE SULFATE 10 MG/5ML
5-10 SOLUTION ORAL
Qty: 100 ML | Refills: 0 | Status: SHIPPED | OUTPATIENT
Start: 2021-01-01

## 2021-01-01 RX ORDER — MORPHINE SULFATE 10 MG/5ML
5-10 SOLUTION ORAL
Status: DISCONTINUED | OUTPATIENT
Start: 2021-01-01 | End: 2021-01-01 | Stop reason: HOSPADM

## 2021-01-01 RX ORDER — MORPHINE SULFATE 2 MG/ML
1-2 INJECTION, SOLUTION INTRAMUSCULAR; INTRAVENOUS
Status: DISCONTINUED | OUTPATIENT
Start: 2021-01-01 | End: 2021-01-01 | Stop reason: HOSPADM

## 2021-01-01 RX ORDER — HEPARIN SODIUM 10000 [USP'U]/100ML
0-5000 INJECTION, SOLUTION INTRAVENOUS CONTINUOUS
Status: DISCONTINUED | OUTPATIENT
Start: 2021-01-01 | End: 2021-01-01

## 2021-01-01 RX ORDER — ACETAMINOPHEN 650 MG/1
650 SUPPOSITORY RECTAL EVERY 6 HOURS PRN
Qty: 12 SUPPOSITORY | Refills: 0 | Status: SHIPPED | OUTPATIENT
Start: 2021-01-01

## 2021-01-01 RX ORDER — HYDROMORPHONE HYDROCHLORIDE 10 MG/ML
1-2 INJECTION INTRAMUSCULAR; INTRAVENOUS; SUBCUTANEOUS
Status: DISCONTINUED | OUTPATIENT
Start: 2021-01-01 | End: 2021-01-01 | Stop reason: HOSPADM

## 2021-01-01 RX ORDER — CEFAZOLIN SODIUM 1 G/50ML
2000 SOLUTION INTRAVENOUS ONCE
Status: COMPLETED | OUTPATIENT
Start: 2021-01-01 | End: 2021-01-01

## 2021-01-01 RX ORDER — IOPAMIDOL 755 MG/ML
500 INJECTION, SOLUTION INTRAVASCULAR ONCE
Status: COMPLETED | OUTPATIENT
Start: 2021-01-01 | End: 2021-01-01

## 2021-01-01 RX ORDER — ACETAMINOPHEN 650 MG/1
650 SUPPOSITORY RECTAL EVERY 6 HOURS PRN
Status: DISCONTINUED | OUTPATIENT
Start: 2021-01-01 | End: 2021-01-01 | Stop reason: HOSPADM

## 2021-01-01 RX ORDER — LORAZEPAM 2 MG/ML
1 INJECTION INTRAMUSCULAR
Status: DISCONTINUED | OUTPATIENT
Start: 2021-01-01 | End: 2021-01-01 | Stop reason: HOSPADM

## 2021-01-01 RX ORDER — CEFAZOLIN SODIUM 1 G/50ML
1750 SOLUTION INTRAVENOUS EVERY 24 HOURS
Status: DISCONTINUED | OUTPATIENT
Start: 2021-01-01 | End: 2021-01-01

## 2021-01-01 RX ORDER — NALOXONE HYDROCHLORIDE 0.4 MG/ML
0.4 INJECTION, SOLUTION INTRAMUSCULAR; INTRAVENOUS; SUBCUTANEOUS
Status: DISCONTINUED | OUTPATIENT
Start: 2021-01-01 | End: 2021-01-01 | Stop reason: HOSPADM

## 2021-01-01 RX ORDER — NALOXONE HYDROCHLORIDE 0.4 MG/ML
0.2 INJECTION, SOLUTION INTRAMUSCULAR; INTRAVENOUS; SUBCUTANEOUS
Status: DISCONTINUED | OUTPATIENT
Start: 2021-01-01 | End: 2021-01-01 | Stop reason: HOSPADM

## 2021-01-01 RX ORDER — ACETAMINOPHEN 325 MG/1
650 TABLET ORAL EVERY 4 HOURS PRN
Status: DISCONTINUED | OUTPATIENT
Start: 2021-01-01 | End: 2021-01-01

## 2021-01-01 RX ORDER — ATROPINE SULFATE 10 MG/ML
1-2 SOLUTION/ DROPS OPHTHALMIC
Qty: 1 BOTTLE | Refills: 0 | Status: SHIPPED | OUTPATIENT
Start: 2021-01-01

## 2021-01-01 RX ORDER — HYDROMORPHONE HYDROCHLORIDE 1 MG/ML
0.2 INJECTION, SOLUTION INTRAMUSCULAR; INTRAVENOUS; SUBCUTANEOUS ONCE
Status: COMPLETED | OUTPATIENT
Start: 2021-01-01 | End: 2021-01-01

## 2021-01-01 RX ORDER — LORAZEPAM 1 MG/1
1 TABLET ORAL
Status: DISCONTINUED | OUTPATIENT
Start: 2021-01-01 | End: 2021-01-01 | Stop reason: HOSPADM

## 2021-01-01 RX ORDER — ATROPINE SULFATE 10 MG/ML
1-2 SOLUTION/ DROPS OPHTHALMIC
Status: DISCONTINUED | OUTPATIENT
Start: 2021-01-01 | End: 2021-01-01 | Stop reason: HOSPADM

## 2021-01-01 RX ORDER — ACETAMINOPHEN 325 MG/1
650 TABLET ORAL EVERY 6 HOURS PRN
Status: DISCONTINUED | OUTPATIENT
Start: 2021-01-01 | End: 2021-01-01 | Stop reason: HOSPADM

## 2021-01-01 RX ORDER — HYDROMORPHONE HYDROCHLORIDE 1 MG/ML
0.2 INJECTION, SOLUTION INTRAMUSCULAR; INTRAVENOUS; SUBCUTANEOUS
Status: DISCONTINUED | OUTPATIENT
Start: 2021-01-01 | End: 2021-01-01 | Stop reason: HOSPADM

## 2021-01-01 RX ORDER — SIMVASTATIN 20 MG
20 TABLET ORAL AT BEDTIME
Status: DISCONTINUED | OUTPATIENT
Start: 2021-01-01 | End: 2021-01-01

## 2021-01-01 RX ORDER — MEMANTINE HYDROCHLORIDE 5 MG/1
10 TABLET ORAL 2 TIMES DAILY
Status: DISCONTINUED | OUTPATIENT
Start: 2021-01-01 | End: 2021-01-01

## 2021-01-01 RX ORDER — POLYETHYLENE GLYCOL 3350 17 G/17G
17 POWDER, FOR SOLUTION ORAL DAILY PRN
Status: DISCONTINUED | OUTPATIENT
Start: 2021-01-01 | End: 2021-01-01 | Stop reason: HOSPADM

## 2021-01-01 RX ADMIN — SIMVASTATIN 20 MG: 20 TABLET, FILM COATED ORAL at 21:12

## 2021-01-01 RX ADMIN — MORPHINE SULFATE 10 MG: 10 SOLUTION ORAL at 14:47

## 2021-01-01 RX ADMIN — TAZOBACTAM SODIUM AND PIPERACILLIN SODIUM 3.38 G: 375; 3 INJECTION, SOLUTION INTRAVENOUS at 16:58

## 2021-01-01 RX ADMIN — MORPHINE SULFATE 5 MG: 10 SOLUTION ORAL at 12:53

## 2021-01-01 RX ADMIN — SODIUM CHLORIDE, POTASSIUM CHLORIDE, SODIUM LACTATE AND CALCIUM CHLORIDE 1848 ML: 600; 310; 30; 20 INJECTION, SOLUTION INTRAVENOUS at 15:58

## 2021-01-01 RX ADMIN — HYDROMORPHONE HYDROCHLORIDE 0.2 MG: 1 INJECTION, SOLUTION INTRAMUSCULAR; INTRAVENOUS; SUBCUTANEOUS at 17:52

## 2021-01-01 RX ADMIN — HEPARIN SODIUM 1350 UNITS/HR: 10000 INJECTION, SOLUTION INTRAVENOUS at 02:06

## 2021-01-01 RX ADMIN — HYDROMORPHONE HYDROCHLORIDE 2 MG: 10 INJECTION, SOLUTION INTRAMUSCULAR; INTRAVENOUS; SUBCUTANEOUS at 06:11

## 2021-01-01 RX ADMIN — MORPHINE SULFATE 10 MG: 10 SOLUTION ORAL at 09:02

## 2021-01-01 RX ADMIN — MORPHINE SULFATE 1 MG: 2 INJECTION, SOLUTION INTRAMUSCULAR; INTRAVENOUS at 01:32

## 2021-01-01 RX ADMIN — ATROPINE SULFATE 2 DROP: 10 SOLUTION OPHTHALMIC at 14:04

## 2021-01-01 RX ADMIN — TAZOBACTAM SODIUM AND PIPERACILLIN SODIUM 3.38 G: 375; 3 INJECTION, SOLUTION INTRAVENOUS at 17:36

## 2021-01-01 RX ADMIN — DILTIAZEM HYDROCHLORIDE 180 MG: 180 CAPSULE, COATED, EXTENDED RELEASE ORAL at 20:42

## 2021-01-01 RX ADMIN — TAZOBACTAM SODIUM AND PIPERACILLIN SODIUM 3.38 G: 375; 3 INJECTION, SOLUTION INTRAVENOUS at 05:26

## 2021-01-01 RX ADMIN — MORPHINE SULFATE 2 MG: 2 INJECTION, SOLUTION INTRAMUSCULAR; INTRAVENOUS at 21:48

## 2021-01-01 RX ADMIN — HYDROMORPHONE HYDROCHLORIDE 0.2 MG: 1 INJECTION, SOLUTION INTRAMUSCULAR; INTRAVENOUS; SUBCUTANEOUS at 18:35

## 2021-01-01 RX ADMIN — DONEPEZIL HYDROCHLORIDE 10 MG: 10 TABLET ORAL at 08:45

## 2021-01-01 RX ADMIN — MORPHINE SULFATE 2 MG: 2 INJECTION, SOLUTION INTRAMUSCULAR; INTRAVENOUS at 03:30

## 2021-01-01 RX ADMIN — HYDROMORPHONE HYDROCHLORIDE 2 MG: 10 INJECTION, SOLUTION INTRAMUSCULAR; INTRAVENOUS; SUBCUTANEOUS at 09:51

## 2021-01-01 RX ADMIN — HEPARIN SODIUM 1650 UNITS/HR: 10000 INJECTION, SOLUTION INTRAVENOUS at 15:41

## 2021-01-01 RX ADMIN — ACETAMINOPHEN 650 MG: 325 TABLET, FILM COATED ORAL at 05:30

## 2021-01-01 RX ADMIN — MORPHINE SULFATE 10 MG: 10 SOLUTION ORAL at 14:02

## 2021-01-01 RX ADMIN — ACETAMINOPHEN 650 MG: 325 TABLET, FILM COATED ORAL at 21:18

## 2021-01-01 RX ADMIN — SIMVASTATIN 20 MG: 20 TABLET, FILM COATED ORAL at 21:05

## 2021-01-01 RX ADMIN — HYDROMORPHONE HYDROCHLORIDE 0.2 MG: 1 INJECTION, SOLUTION INTRAMUSCULAR; INTRAVENOUS; SUBCUTANEOUS at 13:12

## 2021-01-01 RX ADMIN — MORPHINE SULFATE 2 MG: 2 INJECTION, SOLUTION INTRAMUSCULAR; INTRAVENOUS at 14:34

## 2021-01-01 RX ADMIN — MEMANTINE 10 MG: 5 TABLET ORAL at 21:12

## 2021-01-01 RX ADMIN — HEPARIN SODIUM 1050 UNITS/HR: 10000 INJECTION, SOLUTION INTRAVENOUS at 01:08

## 2021-01-01 RX ADMIN — HYDRALAZINE HYDROCHLORIDE 10 MG: 20 INJECTION INTRAMUSCULAR; INTRAVENOUS at 19:07

## 2021-01-01 RX ADMIN — HYDROMORPHONE HYDROCHLORIDE 0.2 MG: 1 INJECTION, SOLUTION INTRAMUSCULAR; INTRAVENOUS; SUBCUTANEOUS at 02:32

## 2021-01-01 RX ADMIN — HYDROMORPHONE HYDROCHLORIDE 0.2 MG: 1 INJECTION, SOLUTION INTRAMUSCULAR; INTRAVENOUS; SUBCUTANEOUS at 21:12

## 2021-01-01 RX ADMIN — HYDROMORPHONE HYDROCHLORIDE 0.2 MG: 1 INJECTION, SOLUTION INTRAMUSCULAR; INTRAVENOUS; SUBCUTANEOUS at 04:07

## 2021-01-01 RX ADMIN — MORPHINE SULFATE 2 MG: 2 INJECTION, SOLUTION INTRAMUSCULAR; INTRAVENOUS at 05:14

## 2021-01-01 RX ADMIN — HEPARIN SODIUM 1050 UNITS/HR: 10000 INJECTION, SOLUTION INTRAVENOUS at 22:39

## 2021-01-01 RX ADMIN — HYDROMORPHONE HYDROCHLORIDE 2 MG: 10 INJECTION, SOLUTION INTRAMUSCULAR; INTRAVENOUS; SUBCUTANEOUS at 02:57

## 2021-01-01 RX ADMIN — DILTIAZEM HYDROCHLORIDE 180 MG: 180 CAPSULE, COATED, EXTENDED RELEASE ORAL at 20:45

## 2021-01-01 RX ADMIN — HYDROMORPHONE HYDROCHLORIDE 0.2 MG: 1 INJECTION, SOLUTION INTRAMUSCULAR; INTRAVENOUS; SUBCUTANEOUS at 13:07

## 2021-01-01 RX ADMIN — TAZOBACTAM SODIUM AND PIPERACILLIN SODIUM 3.38 G: 375; 3 INJECTION, SOLUTION INTRAVENOUS at 22:47

## 2021-01-01 RX ADMIN — ACETAMINOPHEN 650 MG: 325 TABLET, FILM COATED ORAL at 17:36

## 2021-01-01 RX ADMIN — MORPHINE SULFATE 5 MG: 10 SOLUTION ORAL at 12:26

## 2021-01-01 RX ADMIN — SODIUM CHLORIDE, POTASSIUM CHLORIDE, SODIUM LACTATE AND CALCIUM CHLORIDE: 600; 310; 30; 20 INJECTION, SOLUTION INTRAVENOUS at 21:03

## 2021-01-01 RX ADMIN — ACETAMINOPHEN 650 MG: 325 TABLET, FILM COATED ORAL at 14:31

## 2021-01-01 RX ADMIN — MEMANTINE 10 MG: 5 TABLET ORAL at 09:10

## 2021-01-01 RX ADMIN — HYDROMORPHONE HYDROCHLORIDE 0.2 MG: 1 INJECTION, SOLUTION INTRAMUSCULAR; INTRAVENOUS; SUBCUTANEOUS at 08:45

## 2021-01-01 RX ADMIN — HYDROMORPHONE HYDROCHLORIDE 0.2 MG: 1 INJECTION, SOLUTION INTRAMUSCULAR; INTRAVENOUS; SUBCUTANEOUS at 05:19

## 2021-01-01 RX ADMIN — DONEPEZIL HYDROCHLORIDE 10 MG: 10 TABLET ORAL at 09:10

## 2021-01-01 RX ADMIN — MEMANTINE 10 MG: 5 TABLET ORAL at 08:45

## 2021-01-01 RX ADMIN — BISACODYL 10 MG: 10 SUPPOSITORY RECTAL at 09:53

## 2021-01-01 RX ADMIN — HYDROMORPHONE HYDROCHLORIDE 0.2 MG: 1 INJECTION, SOLUTION INTRAMUSCULAR; INTRAVENOUS; SUBCUTANEOUS at 22:53

## 2021-01-01 RX ADMIN — VANCOMYCIN HYDROCHLORIDE 2000 MG: 1 INJECTION, POWDER, LYOPHILIZED, FOR SOLUTION INTRAVENOUS at 18:05

## 2021-01-01 RX ADMIN — ACETAMINOPHEN 650 MG: 325 TABLET, FILM COATED ORAL at 11:49

## 2021-01-01 RX ADMIN — TAZOBACTAM SODIUM AND PIPERACILLIN SODIUM 3.38 G: 375; 3 INJECTION, SOLUTION INTRAVENOUS at 05:33

## 2021-01-01 RX ADMIN — HYDROMORPHONE HYDROCHLORIDE 0.2 MG: 1 INJECTION, SOLUTION INTRAMUSCULAR; INTRAVENOUS; SUBCUTANEOUS at 11:15

## 2021-01-01 RX ADMIN — IOPAMIDOL 78 ML: 755 INJECTION, SOLUTION INTRAVENOUS at 16:15

## 2021-01-01 RX ADMIN — HYDROMORPHONE HYDROCHLORIDE 2 MG: 10 INJECTION, SOLUTION INTRAMUSCULAR; INTRAVENOUS; SUBCUTANEOUS at 14:05

## 2021-01-01 RX ADMIN — HEPARIN SODIUM 1150 UNITS/HR: 10000 INJECTION, SOLUTION INTRAVENOUS at 09:07

## 2021-01-01 RX ADMIN — HYDROMORPHONE HYDROCHLORIDE 0.2 MG: 1 INJECTION, SOLUTION INTRAMUSCULAR; INTRAVENOUS; SUBCUTANEOUS at 01:26

## 2021-01-01 RX ADMIN — HYDROMORPHONE HYDROCHLORIDE 0.2 MG: 1 INJECTION, SOLUTION INTRAMUSCULAR; INTRAVENOUS; SUBCUTANEOUS at 14:31

## 2021-01-01 RX ADMIN — SODIUM CHLORIDE, POTASSIUM CHLORIDE, SODIUM LACTATE AND CALCIUM CHLORIDE: 600; 310; 30; 20 INJECTION, SOLUTION INTRAVENOUS at 11:11

## 2021-01-01 RX ADMIN — SIMVASTATIN 20 MG: 20 TABLET, FILM COATED ORAL at 22:40

## 2021-01-01 RX ADMIN — HYDROMORPHONE HYDROCHLORIDE 0.2 MG: 1 INJECTION, SOLUTION INTRAMUSCULAR; INTRAVENOUS; SUBCUTANEOUS at 09:52

## 2021-01-01 RX ADMIN — HYDROMORPHONE HYDROCHLORIDE 0.2 MG: 1 INJECTION, SOLUTION INTRAMUSCULAR; INTRAVENOUS; SUBCUTANEOUS at 18:42

## 2021-01-01 RX ADMIN — TAZOBACTAM SODIUM AND PIPERACILLIN SODIUM 3.38 G: 375; 3 INJECTION, SOLUTION INTRAVENOUS at 22:35

## 2021-01-01 RX ADMIN — MEMANTINE 10 MG: 5 TABLET ORAL at 20:46

## 2021-01-01 RX ADMIN — TAZOBACTAM SODIUM AND PIPERACILLIN SODIUM 3.38 G: 375; 3 INJECTION, SOLUTION INTRAVENOUS at 22:41

## 2021-01-01 RX ADMIN — TAZOBACTAM SODIUM AND PIPERACILLIN SODIUM 3.38 G: 375; 3 INJECTION, SOLUTION INTRAVENOUS at 11:36

## 2021-01-01 RX ADMIN — SODIUM CHLORIDE 97 ML: 9 INJECTION, SOLUTION INTRAVENOUS at 16:16

## 2021-01-01 RX ADMIN — HYDROMORPHONE HYDROCHLORIDE 0.2 MG: 1 INJECTION, SOLUTION INTRAMUSCULAR; INTRAVENOUS; SUBCUTANEOUS at 04:29

## 2021-01-01 RX ADMIN — TAZOBACTAM SODIUM AND PIPERACILLIN SODIUM 3.38 G: 375; 3 INJECTION, SOLUTION INTRAVENOUS at 17:03

## 2021-01-01 RX ADMIN — MEMANTINE 10 MG: 5 TABLET ORAL at 20:41

## 2021-01-01 RX ADMIN — TAZOBACTAM SODIUM AND PIPERACILLIN SODIUM 3.38 G: 375; 3 INJECTION, SOLUTION INTRAVENOUS at 11:13

## 2021-01-01 RX ADMIN — HYDROMORPHONE HYDROCHLORIDE 2 MG: 10 INJECTION, SOLUTION INTRAMUSCULAR; INTRAVENOUS; SUBCUTANEOUS at 22:19

## 2021-01-01 RX ADMIN — TAZOBACTAM SODIUM AND PIPERACILLIN SODIUM 3.38 G: 375; 3 INJECTION, SOLUTION INTRAVENOUS at 05:55

## 2021-01-01 RX ADMIN — DILTIAZEM HYDROCHLORIDE 180 MG: 180 CAPSULE, COATED, EXTENDED RELEASE ORAL at 21:12

## 2021-01-01 RX ADMIN — HYDROMORPHONE HYDROCHLORIDE 0.2 MG: 1 INJECTION, SOLUTION INTRAMUSCULAR; INTRAVENOUS; SUBCUTANEOUS at 19:56

## 2021-01-01 RX ADMIN — SODIUM CHLORIDE, POTASSIUM CHLORIDE, SODIUM LACTATE AND CALCIUM CHLORIDE: 600; 310; 30; 20 INJECTION, SOLUTION INTRAVENOUS at 03:33

## 2021-01-01 ASSESSMENT — ACTIVITIES OF DAILY LIVING (ADL)
ADLS_ACUITY_SCORE: 23
ADLS_ACUITY_SCORE: 27
ADLS_ACUITY_SCORE: 23
ADLS_ACUITY_SCORE: 27
ADLS_ACUITY_SCORE: 27
ADLS_ACUITY_SCORE: 23
ADLS_ACUITY_SCORE: 27
ADLS_ACUITY_SCORE: 24
ADLS_ACUITY_SCORE: 23
ADLS_ACUITY_SCORE: 27
ADLS_ACUITY_SCORE: 29
ADLS_ACUITY_SCORE: 27
ADLS_ACUITY_SCORE: 23
ADLS_ACUITY_SCORE: 27
ADLS_ACUITY_SCORE: 23
ADLS_ACUITY_SCORE: 22
ADLS_ACUITY_SCORE: 29
ADLS_ACUITY_SCORE: 27
DEPENDENT_IADLS:: CLEANING;COOKING;LAUNDRY;SHOPPING;MEAL PREPARATION;MEDICATION MANAGEMENT;MONEY MANAGEMENT;TRANSPORTATION;INCONTINENCE
ADLS_ACUITY_SCORE: 27
ADLS_ACUITY_SCORE: 23

## 2021-01-01 ASSESSMENT — MIFFLIN-ST. JEOR
SCORE: 1377.63
SCORE: 1435.16
SCORE: 1436.98
SCORE: 1395.25

## 2021-01-01 ASSESSMENT — ENCOUNTER SYMPTOMS: SHORTNESS OF BREATH: 1

## 2021-01-09 PROBLEM — N17.9 ACUTE KIDNEY INJURY (H): Status: ACTIVE | Noted: 2021-01-01
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
## 2021-01-09 PROBLEM — A41.9 SEVERE SEPSIS (H): Status: ACTIVE | Noted: 2021-01-01

## 2021-01-09 PROBLEM — R65.20 SEVERE SEPSIS (H): Status: ACTIVE | Noted: 2021-01-01

## 2021-01-09 PROBLEM — R79.89 ELEVATED TROPONIN: Status: ACTIVE | Noted: 2021-01-01

## 2021-01-09 PROBLEM — R09.02 HYPOXIA: Status: ACTIVE | Noted: 2021-01-01

## 2021-01-09 NOTE — ED PROVIDER NOTES
"History   Chief Complaint:  Fall     HPI   Chris Craig is a 86 year old female who was transferred over to my care from Dr. Cummings.  Briefly this is a 86-year-old demented female who had a fall at home.  Her  was unable to get her up on her own and called for members who continue to have difficulty getting her up.  She appeared tachypneic and hypoxic at home.  EMS picked her up and noticed hypoxia to the mid 80's and presented to the ED.  Her evaluation was significant for elevated troponin of undetermined significance as there was no overt ischemic changes on EKG.  She had mild acute renal insufficiency, D-dimer greater than 20 and mild leukocytosis.  Plan was for admission at [facility] with antibiotics for UTI, heparin initiated for elevated troponin and potential for pulmonary embolus with pending CT scan of the chest abdomen pelvis.  Patient was to be transferred to floor once the results of the CT scans resulted assuming no need for acute intervention based on radiographic studies.    Allergies:  Sulfa Drugs    Medications:  Aspirin 325 mg   Aricept   Namenda   Diltiazem   Lisinopril   Simvastatin     Past Medical History:    Arthritis  Hypertension   Mumps   Dementia   Hyperlipidemia      Physical Exam     Patient Vitals for the past 24 hrs:   BP Temp Temp src Pulse Resp SpO2 Height Weight   01/09/21 1545 (!) 182/104 -- -- 82 (!) 32 93 % -- --   01/09/21 1530 (!) 188/103 -- -- 84 17 95 % -- --   01/09/21 1500 (!) 186/103 -- -- -- -- -- -- --   01/09/21 1341 (!) 145/87 97  F (36.1  C) Temporal -- -- 92 % 1.702 m (5' 7\") 90.5 kg (199 lb 8.3 oz)   01/09/21 1336 -- -- -- 84 (!) 34 (!) 86 % -- --     Physical Exam    HEENT:   No scalp hematoma or defect to the bony calvarium.      Modi's and Racoon's sign negative.      No hemotympanum or septal hematoma.  EYES:  Conjunctiva normal, PERRL  NECK:   C-spine non-tender .      No bony step-off to cervical spine.   CV:    Regular rate and rhythm.     No " murmurs, rubs or gallops.    PULM:  Clear to auscultation bilateral.      No respiratory distress although tachypneic     No subcutaneous emphysema or crepitus.    Moderate-severe right chest tenderness  ABD:   Soft, non-distended.      Moderate RUQ tenderness    No rebound or guarding.  MSK:    No focal bony tenderness to the extremities.    LYMPH:  No cervical lymphadenopathy.  NEURO:  Alert and oriented to person and place, no time.     Speech is clear with no aphasia.      Strength is 5/5 in all 4 extremities.  Sensation is intact.      Normal muscular tone, no tremor.  SKIN:   Warm, dry and intact.    PSYCH:   Mood is good and affect is appropriate.    Emergency Department Course     Imaging:  CT Chest Pulmonary Embolism w contrast   1.  Significant bilateral pulmonary emboli and saddle embolism. CT  findings suggest right heart strain. Thoracic and abdominal aorta are  unremarkable. Calcified atherosclerotic plaque is noted.     2.  Multiple right-sided rib fractures with trace pleural effusion and  small right pneumothorax. Left lung is clear.     3.  Small amount of air in the bladder could indicate UTI, recent  catheterization or instrumentation. Kidneys are unremarkable. No  urinary tract calculi or hydronephrosis.     [Critical Result: Pulmonary embolism]  Reading per radiology    US Lower extremity Venous duplex bilateral  No deep venous thrombosis in the bilateral lower  extremities.  Reading per radiology     Emergency Department Course:  Reviewed:  1500: I reviewed the patient's nursing notes, vitals, past medical records, and Care Everywhere.     Assessments:  1500: The patient was signed out to me from Dr. Cummings.    1707: I rechecked the patient and discussed the results of the ED work up thus far.     1710: I spoke with the patient's family regarding the patient's ED work up.     1737: I spoke with the patient's family regarding their decision for the patient's care.     1810: I spoke with the  patient's family regarding my conversation with Dr. Koroma.       Consults:   : I spoke to Dr. Martino of radiology regarding the patient's case     : I spoke to Dr. Polanco of the hospitalist service who accepts care of the patient.      : I spoke to Dr. Koroma of surgery regarding the patient's case     Interventions:  1558 Lactated Ringers 1.848 L IV Bolus    1658 Zosyn 3.375 g IV     175 Dilaudid 0.2 mg IV     Disposition:  Admitted to Dr. Polanco.    Impression & Plan     Medical Decision Makin-year-old female changed over my care with diagnosis of elevated troponin, renal insufficiency and UTI with associated hypoxia.  CT scans unfortunately revealed saddle pulmonary embolus with profound clot burden.  There are findings to suggest submassive PE with right ventricular dilatation on CT scan as well as elevated troponin.  She is on high-dose heparin.  Patient certainly would be a candidate for thrombectomy or intra-arterial TPA.  I had a prolonged discussion with the patient's  and son regarding goals of care and whether they would like to be transferred to a facility with thrombectomy or intra-arterial TPA accessibility.  After discussion of goals of care, they do not want any further invasive measures.  They agree to ongoing medical management with IV heparin and pain control.  They understand that without intervention she is at increased risk for death and complication including pulmonary hypertension and chronic oxygen dependence.    In addition, the patient has traumatic right-sided rib fractures with small pneumothorax and hemothorax.  The pneumothorax does not require chest tube thoracostomy at this time.  I consulted Dr. Brothers regarding these injuries.  I rediscussed the case with family members as patient will be on heparin and will have increased risk for worsening hemothorax that could require chest tube insertion or thoracic surgery intervention.  They do not want thoracic  surgery intervention in event of massive hemothorax or significant worsening bleeding.  Patient thus amenable to stay here at [facility].  Dr. Pagan of the hospital medicine services was updated regarding the CT findings and the discussion of care with family.    Diagnosis:    ICD-10-CM    1. Hypoxia  R09.02 D dimer quantitative     Troponin I     UA with Microscopic     Urine Culture     Blood culture     Blood culture     Partial thromboplastin time     Partial thromboplastin time     Lactic acid whole blood     CANCELED: Partial thromboplastin time   2. Fall, initial encounter  W19.XXXA    3. Elevated troponin  R77.8    4. Severe sepsis (H)  A41.9     R65.20    5. Acute kidney injury (H)  N17.9    6. Acute saddle pulmonary embolism with acute cor pulmonale (H)  I26.02    7. Traumatic fracture of ribs with pneumothorax, right, closed, initial encounter  S22.41XA     S27.0XXA    8. Hemothorax on right  J94.2      Scribe Disclosure:  I, Gretta Pride, am serving as a scribe at 5:10 PM on 1/9/2021 to document services personally performed by Jordin Hutchison MD based on my observations and the provider's statements to me.      Jordin Hutchison MD  01/09/21 8111

## 2021-01-09 NOTE — ED NOTES
[facility]  ED Nurse Handoff Report    Chris Craig is a 86 year old female   ED Chief complaint: Fall  . ED Diagnosis:   Final diagnoses:   Hypoxia   Fall, initial encounter   Elevated troponin   Severe sepsis (H)   Acute kidney injury (H)     Allergies:   Allergies   Allergen Reactions     Sulfa Drugs      hives       Code Status: DNR / DNI  Activity level - Baseline/Home:  Stand by Assist. Activity Level - Current:   Assist X 2. Lift room needed: No. Bariatric: No   Needed: No   Isolation: No. Infection: Not Applicable.     Vital Signs:   Vitals:    01/09/21 1500 01/09/21 1530 01/09/21 1545 01/09/21 1715   BP: (!) 186/103 (!) 188/103 (!) 182/104 (!) 159/97   Pulse:  84 82 88   Resp:  17 (!) 32 (!) 32   Temp:       TempSrc:       SpO2:  95% 93% 97%   Weight:       Height:           Cardiac Rhythm:  ,      Pain level:    Patient confused: Yes. Patient Falls Risk: Yes.   Elimination Status: Has voided - incontinent, wears brief  Patient Report - Initial Complaint: fall. Focused Assessment: Alert, confused. Presents after fall at home. Short of breath and tachypnea. C/O right sided rib cage pain with movement.  Sats 85% on room air on arrival. Currently on 4 LPM Oxymask. Lactic elevated. IVF given. Dilaudid for pain control.   Tests Performed: labs, ct, LE US. Abnormal Results:   Labs Ordered and Resulted from Time of ED Arrival Up to the Time of Departure from the ED   CBC WITH PLATELETS DIFFERENTIAL - Abnormal; Notable for the following components:       Result Value    WBC 14.3 (*)     Absolute Neutrophil 11.2 (*)     All other components within normal limits   BASIC METABOLIC PANEL - Abnormal; Notable for the following components:    Glucose 170 (*)     Urea Nitrogen 50 (*)     Creatinine 1.47 (*)     GFR Estimate 32 (*)     GFR Estimate If Black 37 (*)     All other components within normal limits   D DIMER QUANTITATIVE - Abnormal; Notable for the following components:    D Dimer  >20.0 (*)     All other components within normal limits   TROPONIN I - Abnormal; Notable for the following components:    Troponin I ES 0.358 (*)     All other components within normal limits   BLOOD GAS VENOUS - Abnormal; Notable for the following components:    PO2 Venous 21 (*)     All other components within normal limits   ROUTINE UA WITH MICROSCOPIC - Abnormal; Notable for the following components:    Ketones Urine Trace (*)     Protein Albumin Urine 70 (*)     Leukocyte Esterase Urine Large (*)     WBC Urine 103 (*)     RBC Urine 38 (*)     Bacteria Urine Few (*)     Squamous Epithelial /HPF Urine 3 (*)     Mucous Urine Present (*)     Amorphous Crystals Few (*)     All other components within normal limits   LACTIC ACID WHOLE BLOOD - Abnormal; Notable for the following components:    Lactic Acid 3.3 (*)     All other components within normal limits   INFLUENZA A/B & SARS-COV2 PCR MULTIPLEX   PARTIAL THROMBOPLASTIN TIME   PERIPHERAL IV CATHETER   MEASURE WEIGHT   NOTIFY PHYSICIAN   NOTIFY PHYSICIAN   URINE CULTURE AEROBIC BACTERIAL   BLOOD CULTURE   BLOOD CULTURE     US Lower Extremity Venous Duplex Bilateral   Final Result   IMPRESSION: No deep venous thrombosis in the bilateral lower   extremities.      NOLVIA IRVIN MD      CT Chest (PE) Abdomen Pelvis w Contrast   Final Result   Abnormal   IMPRESSION:   1.  Significant bilateral pulmonary emboli and saddle embolism. CT   findings suggest right heart strain. Thoracic and abdominal aorta are   unremarkable. Calcified atherosclerotic plaque is noted.      2.  Multiple right-sided rib fractures with trace pleural effusion and   small right pneumothorax. Left lung is clear.      3.  Small amount of air in the bladder could indicate UTI, recent   catheterization or instrumentation. Kidneys are unremarkable. No   urinary tract calculi or hydronephrosis.      [Critical Result: Pulmonary embolism]      Finding was identified on 1/9/2021 4:49 PM.       Dr. Hutchison  was contacted by me on 1/9/2021 4:58 PM and verbalized   understanding of the critical result.       NOLVIA IRVIN MD      Head CT w/o contrast   Final Result   IMPRESSION:      1. No significant interval change, no acute intracranial pathology.   2. Moderate age-related changes.         LENARD GRUBBS MD        .   Treatments provided: IVF, antibiotics, heparin  Family Comments: Notified of plan  OBS brochure/video discussed/provided to patient:  N/A  ED Medications:   Medications   lactated ringers infusion (has no administration in time range)   piperacillin-tazobactam (ZOSYN) infusion 3.375 g (3.375 g Intravenous New Bag 1/9/21 1658)   vancomycin (VANCOCIN) 2,000 mg in sodium chloride 0.9 % 500 mL intermittent infusion (has no administration in time range)   heparin 25,000 units in 0.45% NaCl 250 mL ANTICOAGULANT  infusion (1,650 Units/hr Intravenous New Bag 1/9/21 1541)   HYDROmorphone (PF) (DILAUDID) injection 0.2 mg (has no administration in time range)   lactated ringers BOLUS 1,848 mL (1,848 mLs Intravenous New Bag 1/9/21 1558)   heparin ANTICOAGULANT Loading dose for HIGH INTENSITY TREATMENT * Give BEFORE starting heparin infusion (7,250 Units Intravenous Given 1/9/21 1540)   CT Saline Flush (97 mLs Intravenous Given 1/9/21 1616)   iopamidol (ISOVUE-370) solution 500 mL (78 mLs Intravenous Given 1/9/21 1615)     Drips infusing:  No  For the majority of the shift, the patient's behavior Green. Interventions performed were NA.    Sepsis treatment initiated: yes    Patient tested for COVID 19 prior to admission: YES    ED Nurse Name/Phone Number: Sunni Condon RN,   5:25 PM  RECEIVING UNIT ED HANDOFF REVIEW    Above ED Nurse Handoff Report was reviewed: Yes  Reviewed by: Kaylee dudley RN on January 9, 2021 at 5:54 PM

## 2021-01-09 NOTE — H&P
[facility]    Hospitalist History and Physical    Name: Chris Craig    MRN: [id number]  YOB: 1934    Age: 86 year old  Date of Admission:  1/9/2021  Date of Service (when I saw the patient): 01/09/21    Assessment & Plan   Chris Craig is a 86 year old female with past medical history significant for dementia, hypertension, hyperlipidemia was brought to the emergency room via EMS after she was found to be hypoxic after a fall at home.    In the emergency room patient was found to be hypoxic requiring 4 L of supplemental O2, she was hyperventilating and diaphoretic.  Lab work showed elevated D-dimer, acute renal failure, leukocytosis and elevated troponin.    Addendum: CT chest shows massive saddle PE with right heart strain, some rib fractures and small pneumothorax    Acute respiratory failure with hypoxia  -Has elevated D-dimer, left calf swelling.  EKG shows S wave in lead I and Q-wave in lead III, lactic acidosis   -High suspicion for pulmonary embolism  -Started on anticoagulation  -CT chest ordered in the emergency room pending  -Differential also includes pneumonia.  Blood culture sent in the emergency room  -Started on antibiotics will continue for now  -COVID-19 test is negative  -Ultrasound lower extremity pending to rule out DVT      Sepsis  -Elevated lactic acidosis, leukocytosis, syncopal episode acute renal failure  -UA concerning for UTI  -Blood and urine culture sent  -Started on IV antibiotics  -CT chest abdomen and pelvis ordered in the emergency room pending  -IV fluids with some improvement in lactic acid levels    Rib fractures and pneumothorax  -   S/p fall  - small pneumo  - surgery consulted by ED Physician    Acute renal failure  -Secondary to sepsis and dehydration  -IV fluids, IV antibiotics  -We will avoid nephrotoxins    Elevated troponin  -Likely Secondary to demand ischemia  -EKG with new Q-wave in lead III and ST-T changes  -We will  monitor serial troponins  -Consider cardiac echo    Fall  -Unsteady gait at baseline.  However patient was hypoxic had new renal failure and evidence of infection  -Fall precautions  -PT consult  -Rib fractures and pneumothorax noted on CT.  -Surgery consulted by ER physician Dr. Hutchison    Addendum 6 PM  -CT shows large saddle embolus with right heart strain, pneumothorax and rib fractures  -Findings discussed with patient's son Rafi at length.  -Explained options including lytic therapy.  Family does not want any aggressive intervention including lytic therapy  -Explained that the prognosis is guarded and could result in sudden deterioration of patient's clinical status  -At this point family would like to continue peripheral anticoagulation, antibiotics and would readdress goals of care going forward as necessary  -I will place consult for palliative care  -Care plan discussed with ER physician Dr. Hutchison  -General surgery consulted for her trauma rib fractures and small pneumothorax      DVT Prophylaxis: On IV heparin  Code Status: DNR / DNI discussed with family ( son and spouse)    Disposition: Admitted on telemetry    Primary Care Physician   iT Dale    Chief Complaint   Status post fall  Found to be hypoxic and tachypneic    Unable to obtain a history from the patient due to mental status.  History obtained from ER doctor patient's son and     History of Present Illness   Chris Craig is a 86 year old female with known history of dementia, frequent falls due to unsteady gait, hypertension, hyperlipidemia was brought to the emergency room via EMS who found her to be hypoxic.    Patient had fallen today her  could not get her up.  He called for his son to help who were able to get her up.  She was able to walk with her walker after the fall however was hyperventilating and short of breath.  They were concerned called EMS.  EMS found her to be hypoxic with sats in 70s.  Patient  was requiring 4 L of supplemental O2 in the emergency room.  She does not have any known COVID-19 exposures.  Reported history of fever.  Did not complain of any other concerns or symptoms.    As per family patient has had falls frequently due to her unsteady gait.  She had offers no complaints.  She lives independently with her .  Has underlying dementia unable to get any review of system.  She was oriented to place and person.  Unable to get any detailed review of systems.    Past Medical History    Past Medical History:   Diagnosis Date     Arthritis      Complication of anesthesia      H/O transfusion     but not entire;y sure     Hypertension      Mumps          Past Surgical History   Past Surgical History:   Procedure Laterality Date     BLADDER SURGERY       BUNIONECTOMY RT/LT       C OPEN RX ANKLE DISLOCATN+FIXATN      right     COLONOSCOPY       COLONOSCOPY  3/13/2012    Procedure:COLONOSCOPY; COLONOSCOPY ; Surgeon:LUCAS MOODY; Location: GI     GYN SURGERY      hysterectomy     HC REMOVAL OF TONSILS,<11 Y/O       OPEN REDUCTION INTERNAL FIXATION ANKLE Right 3/30/2016    Procedure: OPEN REDUCTION INTERNAL FIXATION ANKLE;  Surgeon: Denis Gaffney MD;  Location:  OR       Prior to Admission Medications   Prior to Admission Medications   Prescriptions Last Dose Informant Patient Reported? Taking?   Calcium Carbonate-Vitamin D (CALCIUM 600+D PO)   Yes No   Sig: Take 2 tablets by mouth daily    LISINOPRIL PO   Yes No   Sig: Take 20 mg by mouth daily   Multiple Vitamins-Minerals (SENIOR MULTIVITAMIN PLUS) TABS   Yes No   Sig: Take 1 tablet by mouth daily    VITAMIN D PO   Yes No   Sig: Take 50,000 Units by mouth every 30 days    VITAMIN D, CHOLECALCIFEROL, PO   Yes No   Sig: Take 2,000 Units by mouth daily   aspirin 325 MG tablet   Yes No   Sig: Take 81 mg by mouth daily    diltiazem (CARDIZEM CD; CARTIA XT) 360 MG 24 hr CD capsule   Yes No   Sig: Take 180 mg by mouth daily     donepezil (ARICEPT) 10 MG tablet   No No   Sig: Take 1 tablet (10 mg) by mouth every morning   memantine (NAMENDA) 10 MG tablet   No No   Sig: Take 1 tablet (10 mg) by mouth 2 times daily Use as instructed   simvastatin (ZOCOR) 20 MG tablet   Yes No   Sig: Take 20 mg by mouth At Bedtime.      Facility-Administered Medications: None     Allergies   Allergies   Allergen Reactions     Sulfa Drugs      hives       Social History   Social History     Tobacco Use     Smoking status: Never Smoker     Smokeless tobacco: Never Used   Substance Use Topics     Alcohol use: Yes     Alcohol/week: 0.0 standard drinks     Comment: one drink per week     Social History     Social History Narrative    Attended 1 year of college. Worked as  as and manager at a [employer]. Retired when moved to [facility], at age 53. 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 Met  Shaun at [facility]. Shaun is a retired  and is involved in community service.     She lives independently with her .  Non-smoker does not use alcohol    Family History   I have reviewed this patient's family history and updated it with pertinent information if needed.   Family History   Problem Relation Age of Onset     Other - See Comments Mother          at age 84 with cardiac disease      Myocardial Infarction Father          at age 70         Review of Systems   A Comprehensive greater than 10 system review of systems was attempted.  Patient offers no complaints.  Asked to call her  Shaun.   Physical Exam   Temp: 97  F (36.1  C) Temp src: Temporal BP: (!) 145/87 Pulse: 84   Resp: (!) 34 SpO2: 92 % O2 Device: Oxymask Oxygen Delivery: 4 LPM  Vital Signs with Ranges  Temp:  [97  F (36.1  C)] 97  F (36.1  C)  Pulse:  [84] 84  Resp:  [34] 34  BP: (145)/(87) 145/87  SpO2:  [86 %-92 %] 92 %  199 lbs 8.26 oz    GEN:  Alert, oriented x 2 tachypneic  HEENT:  Normocephalic/atraumatic, no scleral icterus, no  nasal discharge, mouth dry  CV:  Regular rate and rhythm, no murmur or JVD.  S1 + S2 noted, no S3 or S4.  LUNGS: Poor inspiratory effort few basilar crackles otherwise clear to auscultation bilaterally without rales/rhonchi/wheezing/retractions.  Symmetric chest rise on inhalation noted.  ABD:  Active bowel sounds, soft, non-tender/non-disten  EXT: left calf more swollen than right no cyanosis.    SKIN:  Dry to touch, chronic stasis change  NEURO: Awake oriented x2.  Moving all extremities no new focal deficits appreciated.    Data   Data reviewed today:  I personally reviewed the EKG tracing showing Normal sinus rhythm with S wave in lead I and Q-wave in lead III with flat T in lateral leads.    Recent Labs   Lab 01/09/21  1416   PHV 7.36   PO2V 21*   PCO2V 46   HCO3V 26     Recent Labs   Lab 01/09/21  1416   WBC 14.3*   HGB 12.9   HCT 41.0   MCV 98        Recent Labs   Lab 01/09/21  1416      POTASSIUM 4.8   CHLORIDE 105   CO2 25   ANIONGAP 7   *   BUN 50*   CR 1.47*   GFRESTIMATED 32*   GFRESTBLACK 37*   MARÍA 9.5     No results for input(s): CULT in the last 168 hours.  No results for input(s): NTBNPI, NTBNP in the last 168 hours.  Recent Labs   Lab 01/09/21  1416   DD >20.0*     No results for input(s): AST, ALT, GGT, ALKPHOS, BILITOTAL, BILICONJ, BILIDIRECT, SHASTA in the last 168 hours.    Invalid input(s): BILIRUBININDIRECT  No results for input(s): INR in the last 168 hours.  Recent Labs   Lab 01/09/21  1416   LACT 3.3*     No results for input(s): LIPASE in the last 168 hours.  No results for input(s): TSH in the last 168 hours.  Recent Labs   Lab 01/09/21  1416   TROPI 0.358*     Recent Labs   Lab 01/09/21  1510   COLOR Yellow   APPEARANCE Slightly Cloudy   URINEGLC Negative   URINEBILI Negative   URINEKETONE Trace*   SG 1.030   UBLD Negative   URINEPH 6.0   PROTEIN 70*   NITRITE Negative   LEUKEST Large*   RBCU 38*   WBCU 103*       Recent Results (from the past 24 hour(s))   Head CT w/o  contrast    Narrative    CT SCAN OF THE HEAD WITHOUT CONTRAST   1/9/2021 2:47 PM     HISTORY: fall    TECHNIQUE:  Axial images of the head and coronal reformations without  IV contrast material. Radiation dose for this scan was reduced using  automated exposure control, adjustment of the mA and/or kV according  to patient size, or iterative reconstruction technique.    COMPARISON: 9/23/2019 PET/CT.    FINDINGS: There is no evidence of intracranial hemorrhage, mass, acute  infarct or anomaly. There is moderate generalized atrophy of the brain  with ventricular and sulcal prominence. There is patchy and confluent  low attenuation in the white matter of the cerebral hemispheres  consistent with moderately pronounced chronic small vessel ischemic  disease.     Mild to moderate left maxillary sinus membrane thickening and  scattered frontal sinus secretions, remainder paranasal sinuses and  mastoids are essentially clear. The bony calvarium and bones of the  skull base appear intact.       Impression    IMPRESSION:     1. No significant interval change, no acute intracranial pathology.  2. Moderate age-related changes.      LENARD GRUBBS MD

## 2021-01-09 NOTE — ED PROVIDER NOTES
"  History   Chief Complaint:  Fall    The history is provided by the patient, a relative and the EMS personnel. History limited by: her history of dementia.     Chris Craig is a 86 year old female with a history of dementia who presents via EMS for evaluation after a fall. This morning, the patient fell at home. When her  was unable to get her upright on his own, he called their son and daughter in law. With their additional help, they were able to get the patient off the floor and she was able to ambulate with her walker to get into a chair. Once she was in the chair, however, her son reports she \"hyperventilated\" for 5 minutes. They checked her oxygen levels on their home oximeter, finding it to be in the 70s, which prompted call to 911. Son reports the patient appeared calmer by the time EMS arrived, but she did seem overall more disoriented than baseline. On the paramedics' arrival, they found her to be hypoxic to the mid-80s on room air which improved to 96% on 4 L nasal cannula. Here, she reports feeling fatigued. She denies any pain currently. Per son, she has not had any known COVID exposures.     Allergies:  Sulfa Drugs    Medications:    Aspirin 325 mg   Aricept   Namenda   Diltiazem   Lisinopril   Simvastatin     Past Medical History:    Arthritis  Hypertension   Mumps   Dementia   Hyperlipidemia     Past Surgical History:    Bladder surgery   Bunionectomy   Right ankle ORIF   Hysterectomy   Tonsillectomy     Family History:    Cardiac disease    Social History:  Marital Status: .   Presents with EMS with son following to the ED.     Review of Systems   Unable to perform ROS: Dementia   Respiratory: Positive for shortness of breath.    Cardiovascular: Positive for chest pain.     Physical Exam     Patient Vitals for the past 24 hrs:   BP Temp Temp src Pulse Resp SpO2 Height Weight   01/09/21 1928 128/72 -- -- -- -- -- -- --   01/09/21 1850 (!) 188/95 95.3  F (35.2  C) Axillary 94 26 98 " "% -- --   01/09/21 1847 -- -- -- -- -- 96 % -- --   01/09/21 1715 (!) 159/97 -- -- 88 (!) 32 97 % -- --   01/09/21 1545 (!) 182/104 -- -- 82 (!) 32 93 % -- --   01/09/21 1530 (!) 188/103 -- -- 84 17 95 % -- --   01/09/21 1500 (!) 186/103 -- -- -- -- -- -- --   01/09/21 1341 (!) 145/87 97  F (36.1  C) Temporal -- -- 92 % 1.702 m (5' 7\") 90.5 kg (199 lb 8.3 oz)   01/09/21 1336 -- -- -- 84 (!) 34 (!) 86 % -- --        Physical Exam  Constitutional: Vital signs reviewed as above.   Head: No external signs of trauma noted.  Eyes: Pupils are equal, round, and reactive to light.   Neck: No JVD noted  Cardiovascular: Normal rate, regular rhythm and normal heart sounds.  No murmur heard. Equal B/L peripheral pulses.  Pulmonary/Chest: Effort normal and breath sounds normal. No respiratory distress. Patient has no wheezes. Patient has no rales.   Gastrointestinal: Soft. There is no tenderness.   Musculoskeletal/Extremities: No edema noted. Normal tone.  Neurological: Patient is alert. She can tell me her name. She does have dementia.  Skin: Skin is warm and dry. There is no diaphoresis noted.   Psychiatric: The patient appears calm.    Emergency Department Course     ECG:  ECG Taken at 13:46  NSR  Low voltage QRS  Incomplete right bundle branch block  Possible inferior infarct, age undetermined  Cannot rule out anterior infarct, age undetermined  There is an S1Q3T3 pattern noted.  Abnormal EKG  Rate: 85. HI: 132. QRS: 102. QTc: 464.  P-R-T Axes:   68   26   29  When compared with 9/23/2019, the RBBB and possible inferior and anterior infarcts are new.  Interpreted by me at 1351 on 1/9/2021    ECG #2:  ECG Taken at 15:15    NSR  Low voltage QRS  Cannot rule out anterior infarct, age undetermined  There is an S1Q3T3 pattern noted.  Abnormal EKG  Rate: 88. HI: 134. QRS: 98. QTc: 442.  P-R-T Axes:   75   60   73  When compared with 1/9/2021 @ 1346, the possible inferior infarct, age undetermined findings have disappeared.  There " "are no other significant changes.  Interpreted by me at 1528 on 1/9/2021      Imaging:  Head CT w/o contrast   Final Result   IMPRESSION:      1. No significant interval change, no acute intracranial pathology.   2. Moderate age-related changes.         LENARD GRUBBS MD      CT Chest (PE) Abdomen Pelvis w Contrast    (Results Pending)   US Lower Extremity Venous Duplex Bilateral    (Results Pending)     Laboratory:  CBC: WBC: 14.3 (H), HGB: 12.9, PLT: 175  BMP: Glucose 170 (H), BUN: 50 (H), Creatinine: 1.47 (H), GFRe: 32 (L), o/w WNL     Troponin (Collected at 1416): 0.358  D dimer: >20.0    Blood gas venous: pO2: 21 (L), o/w WNL  Lactic acid (Collected: 1416): 3.3 (H)    Blood cultures x2: Pending     UA with Microscopic: Pending   Urine culture: Pending     Symptomatic influenza A/B & COVID-19 Virus Multiplex PCR: All Negative    Emergency Department Course:    Reviewed:  I reviewed the patient's nursing notes, vitals, past medical records, and Care Everywhere.     Assessments & Consults:  ED Course as of Jan 09 1935   Sat Jan 09, 2021   1338 History limited due to patient's dementia.  Exam performed, as documented above.        1341 Discussed with patient's family son, Rafi ([phone]). Patient fell.  couldn't get her up. Called son. They were able to get her up. Able to use her walker to ambulate to a chair and then \"hyperventilated\" for about 5 minutes. Seemed to calm somewhat by the time the medics got there. More \"disoriented\" per son.  gave med list to medics.       1511 Nursing was notified that the patient's D-dimer is >20.  I will still plan on ordering a chest CT as now the patient is complaining of some right-sided chest discomfort.      1512 D/W Dr. Polanco.  We will plan on performing CT chest (PE), abdomen/pelvis as high intensity heparin dosing.  We will plan on hospital admission for ongoing care.      1530 Updated patient's son. CT pending. Patient is DNR/DNI. Ok with " hospitalization/fluids/meds/BiPAP (if needed)        Interventions:  Medications   lactated ringers BOLUS 1,848 mL (has no administration in time range)   lactated ringers infusion (has no administration in time range)   piperacillin-tazobactam (ZOSYN) infusion 3.375 g (has no administration in time range)   vancomycin (VANCOCIN) 2,000 mg in sodium chloride 0.9 % 500 mL intermittent infusion (has no administration in time range)   heparin 25,000 units in 0.45% NaCl 250 mL ANTICOAGULANT  infusion (1,650 Units/hr Intravenous New Bag 1/9/21 1541)   heparin ANTICOAGULANT Loading dose for HIGH INTENSITY TREATMENT * Give BEFORE starting heparin infusion (7,250 Units Intravenous Given 1/9/21 1540)       Disposition:  The patient was admitted to the hospital under the care of Dr. Polanco. CT and US pending at the time of admission.    Impression & Plan      Covid-19  Chris Craig was evaluated during a global COVID-19 pandemic, which necessitated consideration that the patient might be at risk for infection with the SARS-CoV-2 virus that causes COVID-19.   Applicable protocols for evaluation were followed during the patient's care.   COVID-19 was considered as part of the patient's evaluation. The plan for testing is:  a test was obtained during this visit.    CMS Diagnosis:   The patient has signs of Severe Sepsis        If one the following conditions is present, a 30 mL/kg bolus is recommended as part of the 6 hour bundle (IBW can be used for BMI >30, or document refusal/contraindication):      1.   Initial hypotension  defined as 2 bps < 90 or map < 65 in the 6hrs before or 6hrs after time zero.     2.  Lactate >4.     The patient has signs of Severe Sepsis as evidenced by:    1. 2 SIRS criteria, AND  2. Suspected infection, AND   3. Organ dysfunction: Lactic Acidosis with value >2.0    Time severe sepsis diagnosis confirmed: 1439  01/09/21 as this was the time when Lactate resulted, and the level was > 2.0    3  Hour Severe Sepsis Bundle Completion:    1. Initial Lactic Acid Result:   Recent Labs   Lab Test 01/09/21  1806 01/09/21  1416   LACT 3.0* 3.3*     2. Blood Cultures before Antibiotics: Yes  3. Broad Spectrum Antibiotics Administered:  yes       Anti-infectives (From admission through now)    Start     Dose/Rate Route Frequency Ordered Stop    01/09/21 1510  vancomycin (VANCOCIN) 2,000 mg in sodium chloride 0.9 % 500 mL intermittent infusion      2,000 mg  over 2 Hours Intravenous ONCE 01/09/21 1510      01/09/21 1505  piperacillin-tazobactam (ZOSYN) infusion 3.375 g      3.375 g  over 30 Minutes Intravenous ONCE 01/09/21 1504 01/09/21 1736          4. Fluid volume administered in ED:  Obese patient (BMI >30); 30 mL/kg bolus based on IBW given (see amount below).    BMI Readings from Last 1 Encounters:   01/09/21 31.25 kg/m      30 mL/kg fluids based on weight: 2,720 mL  30 mL/kg fluids based on IBW (must be >= 60 inches tall): 1,850 mL                Severe Sepsis reassessment:  1. Repeat Lactic Acid Level: 3.0  2. MAP>65 after initial IVF bolus, will continue to monitor fluid status and vital signs          Medical Decision Making:   Chris Craig is a 86 year old female who presents with hypoxia and weakness.  Please see the HPI and exam for specifics.  The patient has dementia and was not able to give history.  History was provided by the patient's son, who I spoke to on the phone.  The patient was found to have several abnormalities.  Her lactic acid is elevated, she certainly seems to be dehydrated, she has an elevated troponin, and is hypoxic with a greatly elevated D-dimer as well.  Fortunately, she appears to not have COVID-19.  The above symptoms and results, combined with some degree of right-sided chest discomfort when moving (the patient still denies pain if you asked her), make me concerned for a relatively large PE causing right heart strain.  Chest imaging is pending and because of the patient's  dementia a CT of her abdomen and pelvis was ordered as well.  The creatinine is elevated though this is likely reflective of dehydration so the patient will be aggressively hydrated and a repeat lactic acid will be tested as well.  After discussion with hospitalist, we will order ultrasound of the lower extremities as well.  My partner, Dr. Hutchison will follow the CT result and order a repeat lactic acid when the initial fluid bolus has finished.    Given the patient's multiple issues, she will be admitted to the hospital for further ongoing care.  Antibiotics have been started and heparin will be ordered as well.    Diagnosis:     ICD-10-CM    1. Hypoxia  R09.02 D dimer quantitative     Troponin I     UA with Microscopic     Urine Culture     Blood culture     Blood culture     Partial thromboplastin time     Partial thromboplastin time     Lactic acid whole blood     CANCELED: Partial thromboplastin time   2. Fall, initial encounter  W19.XXXA    3. Elevated troponin  R77.8    4. Severe sepsis (H)  A41.9     R65.20    5. Acute kidney injury (H)  N17.9        Scribe Disclosure:  I, Fabi Self, am serving as a scribe on 1/9/2021 at 1:36 PM to personally document services performed by Jose Cummings DO based on my observations and the provider's statements to me.      1/9/2021   EMERGENCY DEPARTMENT     Jose Cummings DO  01/09/21 1936

## 2021-01-09 NOTE — ED NOTES
Bed: ED08  Expected date: 1/9/21  Expected time: 1:24 PM  Means of arrival: Ambulance  Comments:  BV2-85y, F, dementia, fall, short shallow breathing, sp02 mid 80's per EMS, normal for her mentation

## 2021-01-09 NOTE — ED NOTES
Care Management Initial Consult    General Information  Assessment completed with: Children, Spouse or significant other(All information obtained from  Shaun (Pt has dementia), Call placed to  Shaun and her son was there as well.  Type of CM/SW Visit: Initial Assessment    Primary Care Provider verified and updated as needed: Yes   Readmission within the last 30 days: no previous admission in last 30 days      Reason for Consult: discharge planning  Advance Care Planning: Advance Care Planning Reviewed: present on chart          Communication Assessment  Patient's communication style: spoken language (English or Bilingual)             Cognitive  Cognitive/Neuro/Behavioral:   Dementia. Patient does know her  and children.                    Living Environment:   People in home: spouse   Shaun  Current living Arrangements: house(One story house with a basement.)      Able to return to prior arrangements: yes       Family/Social Support:  Care provided by: spouse/significant other, child(micah), homecare agency  Provides care for: no one  Marital Status:   , Children, Other (specify)(Home health aid.)  Shaun       Description of Support System: Supportive, Involved    Support Assessment: Adequate family and caregiver support    Current Resources:   Skilled Home Care Services: Home Health Aid  Community Resources:    Equipment currently used at home: grab bar, toilet, walker, rolling(oximeter)  Supplies currently used at home: Incontinence Supplies    Employment/Financial:  Employment Status:          Financial Concerns: No concerns identified   Referral to Financial Counselor: No       Lifestyle & Psychosocial Needs:        Socioeconomic History     Marital status:      Spouse name: Not on file     Number of children: Not on file     Years of education: Not on file     Highest education level: Not on file     Tobacco Use     Smoking status: Never Smoker     Smokeless  tobacco: Never Used   Substance and Sexual Activity     Alcohol use: Yes     Alcohol/week: 0.0 standard drinks     Comment: one drink per week     Drug use: No     Sexual activity: Not Currently       Functional Status:  Prior to admission patient needed assistance:   Dependent ADLs:: Ambulation-walker, Bathing, Dressing, Grooming, Incontinence  Dependent IADLs:: Cleaning, Cooking, Laundry, Shopping, Meal Preparation, Medication Management, Money Management, Transportation, Incontinence                Values/Beliefs:  Spiritual, Cultural Beliefs, Zoroastrianism Practices, Values that affect care: Not addressed.                Additional Information:  Call placed to her  Shaun to complete this assessment. He is her main caregiver but his children help and he also hires home health aids to bathe her and cook two time a week.  This seems to be working out well.  He assists her with all cares.  I explained that she might benefit from skilled home care at discharge and explained coverage by insurance. Especially since she had a fall at home it may be good to see if more equipment might be helpful.      Updated her ED nurse and either she will call to update Shaun or MD will about admission.    Venus Reed RN Care Coordinator  Inpatient Care Coordination - Emergency Department  [facility]  [phone]

## 2021-01-09 NOTE — ED TRIAGE NOTES
Pt arrives via EMS from home d/t fall. Per EMS, pt's  unable to pick her up so called son and daughter-in-law to help get her off the floor. Initial sat reading for the family was 70s. Medics had sats 96% on 4 L NC. Pt with shallow respirations. Pt reportedly acting baseline. Unable to states what happened. No c-collar, BG or IV established. ABC intact. A&O to baseline.

## 2021-01-10 NOTE — PHARMACY-ADMISSION MEDICATION HISTORY
Admission medication history interview status for this patient is complete. See [facility] admission navigator for allergy information, prior to admission medications and immunization status.     Medication history interview done via telephone during Covid-19 pandemic, indicate source(s): Rafi (son) @ [phone]  Medication history resources (including written lists, pill bottles, clinic record):None    Changes made to PTA medication list:  Added: none  Deleted: none  Changed: cardizem CD from 180mg to 360mg qpm, lisinopril from 20mg to 10mg qam, vitamin D from 2000 to 5000 units daily    Actions taken by pharmacist (provider contacted, etc):None     Additional medication history information:None    Medication reconciliation/reorder completed by provider prior to medication history?  n   (Y/N)         Prior to Admission medications    Medication Sig Last Dose Taking? Auth Provider   aspirin 325 MG tablet Take 81 mg by mouth daily  1/9/2021 at am Yes Reported, Patient   Calcium Carbonate-Vitamin D (CALCIUM 600+D PO) Take 1 tablet by mouth 2 times daily  1/9/2021 at am Yes Reported, Patient   diltiazem (CARDIZEM CD; CARTIA XT) 360 MG 24 hr CD capsule Take 360 mg by mouth every evening  1/8/2021 at Unknown time Yes Reported, Patient   donepezil (ARICEPT) 10 MG tablet Take 1 tablet (10 mg) by mouth every morning 1/9/2021 at Unknown time Yes Natalio Gray MD   LISINOPRIL PO Take 10 mg by mouth daily  1/9/2021 at am Yes Reported, Patient   memantine (NAMENDA) 10 MG tablet Take 1 tablet (10 mg) by mouth 2 times daily Use as instructed 1/9/2021 at am Yes Natalio Gray MD   Multiple Vitamins-Minerals (SENIOR MULTIVITAMIN PLUS) TABS Take 1 tablet by mouth daily  1/9/2021 at Unknown time Yes Reported, Patient   simvastatin (ZOCOR) 20 MG tablet Take 20 mg by mouth At Bedtime. 1/8/2021 at Unknown time Yes Reported, Patient   VITAMIN D PO Take 50,000 Units by mouth every 30 days  12-14-20 at Unknown time Yes Reported,  Patient   VITAMIN D, CHOLECALCIFEROL, PO Take 5,000 Units by mouth daily  1/9/2021 at am Yes Reported, Patient

## 2021-01-10 NOTE — PLAN OF CARE
VSS. Tele SR with ST depression. Heparin gtt continues. O2 in place @ 3L w/ O2 sats >92%. Pt does endorse dyspnea at times and breathing appears shallow/tachypneic. Pt reports R rib pain. PRN dilaudid given. Bruising present to R shoulder. Edema present in BLEs (L>R). Repositioned in bed q2hr overnight. Pt is oriented to person/place. Responds to most questions with 1 word responses and does not engage in meaningful conversation. LR @ 150/hr. Awaiting urine cultures. PW in place with low UOP. Urine appears cloudy. Continue IV abx. Continue heparin gtt. Continue POC.

## 2021-01-10 NOTE — PHARMACY-VANCOMYCIN DOSING SERVICE
Pharmacy Vancomycin Initial Note  Date of Service 2021  Patient's  1934  86 year old, female    Indication: Sepsis    Current estimated CrCl = Estimated Creatinine Clearance: 31.7 mL/min (A) (based on SCr of 1.47 mg/dL (H)).    Creatinine for last 3 days  2021:  2:16 PM Creatinine 1.47 mg/dL    Recent Vancomycin Level(s) for last 3 days  No results found for requested labs within last 72 hours.      Vancomycin IV Administrations (past 72 hours)                   vancomycin (VANCOCIN) 2,000 mg in sodium chloride 0.9 % 500 mL intermittent infusion (mg) 2,000 mg New Bag 21 1805                Nephrotoxins and other renal medications (From now, onward)    Start     Dose/Rate Route Frequency Ordered Stop    01/10/21 1800  vancomycin (VANCOCIN) 1,750 mg in sodium chloride 0.9 % 500 mL intermittent infusion      1,750 mg  over 2 Hours Intravenous EVERY 24 HOURS 21 1911      21 2300  piperacillin-tazobactam (ZOSYN) infusion 3.375 g      3.375 g  over 30 Minutes Intravenous EVERY 6 HOURS 21 1853      21 1510  vancomycin (VANCOCIN) 2,000 mg in sodium chloride 0.9 % 500 mL intermittent infusion      2,000 mg  over 2 Hours Intravenous ONCE 21 1510            Contrast Orders - past 72 hours (72h ago, onward)    Start     Dose/Rate Route Frequency Ordered Stop    21 1615  iopamidol (ISOVUE-370) solution 500 mL      500 mL Intravenous ONCE 21 1610 21 1615                Plan:  1.  Start vancomycin  1,750 mg IV q24h tomorrow (1/10/2021) Patient received a loading dose of 2,000 mg today   2.  Goal Trough Level: 15-20 mg/L   3.  Pharmacy will check trough levels as appropriate in 1-3 Days.    4. Serum creatinine levels will be ordered daily for the first week of therapy and at least twice weekly for subsequent weeks.    5. [place] method utilized to dose vancomycin therapy: Method 1 but slightly lower due to patient's weight and renal function    Stephanie DOMINGUEZ  Lauren, [facility]

## 2021-01-10 NOTE — PROGRESS NOTES
"Daughter in law - Ruthann (given consent from pt to release medical info) called- Pt lives w/spouse. He is starting to decline, memory issues as well. They both fall \"a lot\". Family is looking into alternative living situation due to safety issues. The spouse Shaun has hired a RN to come to the home a couple times a week (agency unknown). Ruthann asked for Dr. Guzmán to contact Rafi (son) to provide update today/plan. SW/PT will be consulted as well.   "

## 2021-01-10 NOTE — PROGRESS NOTES
I did a chart check on Mrs. Craig's situation. Since she has a saddle embolus with right heart strain, we cannot perform any type of surgical intervention at this time for her rib fractures.    We'll need to monitor her for hemothorax with daily CXR and at least q12h hgb checks, since she is anticoagulated.

## 2021-01-10 NOTE — PLAN OF CARE
Diagnosis. Hypoxia  History. Dementia, HTN, Hyperlip,   Labs/Protocol. Potassium 4.8, Creatinine- 1.47, Lactic 3.3, Troponin- 0.358. Hep 10A- >1.10, paused for 60 min and decreased rate per protocol.   Vitals. High BP, gave Hydralazine with relief. Patient received scheduled Diltiazem   Tele. Sr with ST depression  Respiratory. LS diminished. Patient on 3 L of O2 sats in mid 90's.  Neuro. Disoriented to time. Patient confused at times.   GI/. Incontinent of bladder. Perineal care provided. Using purewic with good output  Skin. Slightly bruised with small scabs on feet. Pale.  LDA's. Right PIV infusing Heparin, Left infusing LR. Dressing CDI. Patient may be good candidate for PICC  Diet. Low fat  Activity. A of 2, GB   Plan. Patient had CT of chest, found bilateral PE. Heparin started. Gave dilaudid x 2 on shift. Continue to monitor patients pain and oxygen.

## 2021-01-10 NOTE — PROGRESS NOTES
"[facility]  General Surgery Progress Note           Assessment and Plan:   Assessment:   PE  UTI  Right rib fractures and small PTX      Plan:   -family agreeing to medical management with  Antibiotics and heparin only (no lytics or embolectomy) and also not wanting surgery for hemothorax should it develop (see Dr Hutchison' note)  Thoracic surgery consulting on rib fractures and PTX  With surgical options declined by the family - we will sign off, please call if we can help or if surgically amenable condition develops and family wishes to pursue this         Interval History:   Sleepy, slow speech, reports diffuse right chest pain  HR and BP improved         Physical Exam:   Blood pressure 135/65, pulse 79, temperature 96  F (35.6  C), temperature source Axillary, resp. rate 20, height 1.702 m (5' 7\"), weight 92.3 kg (203 lb 6.4 oz), SpO2 92 %, not currently breastfeeding.    I/O last 3 completed shifts:  In: 53 [I.V.:53]  Out: 200 [Urine:200]    Lungs:   no increased work of breathing and good air exchange             Data:     Recent Labs   Lab 01/10/21  0649 01/09/21  1416   WBC 10.8 14.3*   HGB 11.1* 12.9   HCT 36.2 41.0    98   * 175         Antonio Koroma MD     "

## 2021-01-10 NOTE — PLAN OF CARE
"1154: Alert to self, hospital, January, 2012 per pt. Video chatted w/family. Yes/no answers and \"I love you guys\". IV dilaudid given for R side pain, tylenol also ordered/given. Repositioning w/2 assist. O2 88% 3L after dilaudid - increased O2 to 4L NC @94%.Urine via purwick, cloudy, odor cx pend. IV ancef. Edema L > R lower ext/feet. Able to move all extremities.     Update 1330: Pain to R side chest/ribs. Dilaudid 0.2mg x2; tylenol x1. RR 24-28, shallow. Heparin infusing 1150 (hold, bolus, rate decrease this a.m.) Edema LLE L>R. Denies numbness/tingling. Dark hard bruising noted R upper back. Tender to touch. Reposition q2hr.  PO intake good - taking po fluids well. LR at 150ml/hr - now decreased to 75ml\hr. UTI - ancef IV. Hep 10a 1515. U.O. purewick 250 thick yellow tan urine, small amount incontinent in pad.   "

## 2021-01-10 NOTE — PROGRESS NOTES
[facility]    Medicine Progress Note - Hospitalist Service       Date of Admission:  1/9/2021  Length of stay: 1 days    Assessment & Plan   Chris Craig is an 86-year-old female with a history of dementia, HTN and HLD who was brought into the ED after a fall.  Patient was subsequently found to have UTI and a submassive PE with right heart strain.    Acute hypoxic respiratory failure  Acute saddle pulmonary embolism  - The patient was found to be hypoxic in the ED.  D-dimer was undetectably high.  She ended up undergoing a CT chest PE protocol.  This returned positive for bilateral pulmonary emboli with a saddle embolism.  CT findings were consistent with right heart strain.  Patient was not hypotensive.  Endovascular therapy and thrombolytics were discussed with family and they declined these aggressive measures.  Patient therefore will be treated only with anticoagulation.  - Her troponin was elevated at 0.3 consistent with type II process from the right heart strain.  - There is no reason to check an echocardiogram, as we would not act on any findings on it.  - Continue IV heparin for now while we are monitoring her clinical stability.  Ultimately, would anticipate her going on a NOAC.  - Patient is DNR.  Discussed with family and patient that the clinical picture can change quickly with the significant clot burden that she has.    Fall  Right rib fractures  - Found to have multiple right-sided rib fractures, trace pleural effusion and a right pneumothorax.  This is secondary to the fall.  Need to have a close eye on her respiratory status because of her need for anticoagulation.  - Thoracic surgery has been consulted.  Recommends twice daily hemoglobin checks and daily chest x-rays to monitor for hemothorax.  No operative management available due to her clinical tenuousness.  - Tylenol and dilaudid PRN for pain control    Acute kidney injury  Creatinine on admission 1.47.  Baseline creatinine  "1.15.  Creatinine has trended down to 1.20 with IV fluids.  Perhaps there was a component of hypovolemia/prerenal etiology contributing to her fall.    UTI  Urine appears infected, air seen on the bladder.  Zosyn started empirically, will narrow based upon culture results.    Debility  - Per family, patient has an unsteady gait at baseline and with this fall, she needs PT/OT evaluation.  Particularly given that she was independent with her . Those evaluations are pending.  Anticipate placement thereafter.    Dementia  - Appears grossly oriented, no sign of delirium right now.  Continue donepezil and memantine    HTN  - Continue patient's home diltiazem.  Lisinopril on hold..    Diet: Regular  DVT Prophylaxis: IV heparin  Malnutrition: No  Vasquez Catheter: No  Code Status: No CPR- Do NOT Intubate     Disposition Plan   Expected discharge:   Due to her satellite was not, patient is at risk for sudden decompensation.  If she maintains clinical stability over the next 24 to 48 hours, patient should be able to be discharged.  Anticipate TCU.    Fercho Guzmán MD  Hospitalist Service  [facility]  ______________________________________________________________________    Interval History   Patient is complaining of pain in the right side of the chest.  Breathing seems to be okay.  Laying flat in the bed, no other complaints.    Data reviewed today: I reviewed all medications, new labs and imaging results over the last 24 hours.     Physical Exam   /78 (BP Location: Left arm)   Pulse 72   Temp 97.4  F (36.3  C) (Axillary)   Resp 28   Ht 1.702 m (5' 7\")   Wt 92.3 kg (203 lb 6.4 oz)   SpO2 94%   BMI 31.86 kg/m    203 lbs 6.4 oz       General: Appears uncomfortable.    HEENT: No scleral icterus. Oropharynx moist.     Neck: Supple. Normal range of motion.     Pulmonary: Normal work of breathing. Clear to auscultation bilaterally.  Tender over right chest.    Cardiovascular: Regular rate " and rhythm without murmur or extra heart sounds.    Abdomen: Soft and non-tender.    Extremities: No peripheral edema. No clubbing or cyanosis.     Neurologic: Awake, alert, appropriate.    Skin: Warm and dry.    Psychiatric: Normal affect and mood.     Data    Recent Labs   Lab 01/10/21  0649 01/09/21  1416   WBC 10.8 14.3*   HGB 11.1* 12.9    98   * 175    137   POTASSIUM 4.6 4.8   CHLORIDE 107 105   CO2 26 25   BUN 39* 50*   CR 1.20* 1.47*   ANIONGAP 6 7   MARÍA 8.1* 9.5   * 170*   TROPI  --  0.358*     Recent Results (from the past 24 hour(s))   Head CT w/o contrast    Narrative    CT SCAN OF THE HEAD WITHOUT CONTRAST   1/9/2021 2:47 PM     HISTORY: fall    TECHNIQUE:  Axial images of the head and coronal reformations without  IV contrast material. Radiation dose for this scan was reduced using  automated exposure control, adjustment of the mA and/or kV according  to patient size, or iterative reconstruction technique.    COMPARISON: 9/23/2019 PET/CT.    FINDINGS: There is no evidence of intracranial hemorrhage, mass, acute  infarct or anomaly. There is moderate generalized atrophy of the brain  with ventricular and sulcal prominence. There is patchy and confluent  low attenuation in the white matter of the cerebral hemispheres  consistent with moderately pronounced chronic small vessel ischemic  disease.     Mild to moderate left maxillary sinus membrane thickening and  scattered frontal sinus secretions, remainder paranasal sinuses and  mastoids are essentially clear. The bony calvarium and bones of the  skull base appear intact.       Impression    IMPRESSION:     1. No significant interval change, no acute intracranial pathology.  2. Moderate age-related changes.      LENARD GRUBBS MD   CT Chest (PE) Abdomen Pelvis w Contrast   Result Value    Radiologist flags Pulmonary embolism (AA)    Narrative    CT CHEST PE ABDOMEN AND PELVIS WITH CONTRAST 1/9/2021 4:41 PM    CLINICAL  HISTORY: Hypoxia, elevated D-dimer, chest pain, sepsis,  dementia.    TECHNIQUE: CT angiogram chest and routine CT abdomen pelvis with IV  contrast. Arterial phase through the chest and venous phase through  the abdomen and pelvis. 2D and 3D MIP reconstructions were preformed  by the CT technologist. Dose reduction techniques were used.     CONTRAST: 78mL [id number]    COMPARISON: None.    FINDINGS:  ANGIOGRAM CHEST: Extensive bilateral pulmonary emboli are present with  saddle embolism present on image 58 of series 7. Thoracic aorta is  negative for dissection. Straightening of the interventricular septum  is concerning for elevated right heart pressures.     LUNGS AND PLEURA: Trace amount of right pleural fluid is present.  Lungs are grossly clear. Small right pneumothorax is present. No  evidence of left pneumothorax.    MEDIASTINUM/AXILLAE: Heart is normal in size. No pericardial fluid.  Small hiatal hernia. Esophagus is unremarkable. No enlarged lymph  nodes.    HEPATOBILIARY: Normal.    PANCREAS: Normal.    SPLEEN: Normal.    ADRENAL GLANDS: Normal.    KIDNEYS/BLADDER: Kidneys are unremarkable. No hydronephrosis or  urinary tract calculi. Bladder is unremarkable but contains a small  focus of air potentially reflecting UTI.    BOWEL: No bowel obstruction or diverticulitis. Appendix not  visualized.    LYMPH NODES: No enlarged abdominal or pelvic lymph nodes.    PELVIC ORGANS: Rectum is distended with fecal debris. Uterus not  identified. No pelvic mass or free fluid.    OTHER: None.    MUSCULOSKELETAL: Multiple right-sided rib fractures are present likely  accounting for the trace right pleural effusion and small  pneumothorax.      Impression    IMPRESSION:  1.  Significant bilateral pulmonary emboli and saddle embolism. CT  findings suggest right heart strain. Thoracic and abdominal aorta are  unremarkable. Calcified atherosclerotic plaque is noted.    2.  Multiple right-sided rib fractures with trace  pleural effusion and  small right pneumothorax. Left lung is clear.    3.  Small amount of air in the bladder could indicate UTI, recent  catheterization or instrumentation. Kidneys are unremarkable. No  urinary tract calculi or hydronephrosis.    [Critical Result: Pulmonary embolism]    Finding was identified on 1/9/2021 4:49 PM.     Dr. Hutchison was contacted by me on 1/9/2021 4:58 PM and verbalized  understanding of the critical result.     NOLVIA IRVIN MD   US Lower Extremity Venous Duplex Bilateral    Narrative    US LOWER EXTREMITY VENOUS DUPLEX BILATERAL 1/9/2021 4:45 PM    CLINICAL HISTORY: Bilateral lower extremity swelling. Elevated  D-dimer.    TECHNIQUE: Venous Duplex ultrasound of bilateral lower extremities  with and without compression, augmentation and duplex. Color flow and  spectral Doppler with waveform analysis performed.    COMPARISON: None.    FINDINGS: Exam includes the common femoral, femoral, popliteal veins  as well as segmentally visualized deep calf veins and greater  saphenous vein.     RIGHT: No deep vein thrombosis. No superficial thrombophlebitis. No  popliteal cyst.    LEFT: No deep vein thrombosis. No superficial thrombophlebitis. No  popliteal cyst.      Impression    IMPRESSION: No deep venous thrombosis in the bilateral lower  extremities.    NOLVIA IRVIN MD       Medications      Current Facility-Administered Medications   Medication Dose Route Frequency     diltiazem ER COATED BEADS  180 mg Oral QPM     donepezil  10 mg Oral QAM     memantine  10 mg Oral BID     piperacillin-tazobactam  3.375 g Intravenous Q6H     simvastatin  20 mg Oral At Bedtime     sodium chloride (PF)  3 mL Intracatheter Q8H

## 2021-01-10 NOTE — CONSULTS
[facility] Surgery Consultation    Chris Craig MRN# [id number]   Age: 86 year old YOB: 1934     Date of Admission:  1/9/2021    Reason for consult: RIB FRACTURES       Requesting physician: Foster       Level of consult: Consult, follow and place orders           Assessment and Plan:   Assessment:   Right side rib fractures  PE  Patient Active Problem List    Diagnosis Date Noted     Hypoxia 01/09/2021     Priority: Medium     Elevated troponin 01/09/2021     Priority: Medium     Acute kidney injury (H) 01/09/2021     Priority: Medium     Fall, initial encounter 01/09/2021     Priority: Medium     Severe sepsis (H) 01/09/2021     Priority: Medium     MCI (mild cognitive impairment) 07/22/2016     Priority: Medium     ACP (advance care planning) 06/29/2016     Priority: Medium     Advance Care Planning 6/29/2016: Receipt of ACP document:  Received: Health Care Directive which was witnessed or notarized on 3/30/16.  Document previously scanned on 4/5/16.  Validation form previously completed and scanned.  Code Status needs to be updated to reflect choices in most recent ACP document.  Confirmed/documented designated decision maker(s).  Added by Griselda Webster RN, Advance Care Planning Liaison.         Post-op pain 03/30/2016     Priority: Medium         Plan:   small PTX would typically be observed, need for anticoagulation complicates management of fractures as thoracic surgery is not available emergently at this hospital. Discussed this with ER who will discuss with family. Family is currently opting NOT to employ embolectomy or thrombolytics but are consenting to heparin.   Thoracic consult for managment of fractures with full anticoagulation            Chief Complaint:   Fall, SOB     History is obtained from the patient, electronic health record and emergency department physician         History of Present Illness:   This patient is a 86 year old  female with a significant  past medical history of MCI and recently more frequent falls who presents with the following condition requiring a hospital admission: fall with SOB. Fell earlier today. She does not think she tripped but cannot describe the mechanism of her fall. She denies any pain but right ribs/chest. Specifically denies striking her head or LOC.             Past Medical History:     Past Medical History:   Diagnosis Date     Arthritis      Complication of anesthesia      H/O transfusion     but not entire;y sure     Hypertension      Mumps              Past Surgical History:     Past Surgical History:   Procedure Laterality Date     BLADDER SURGERY       BUNIONECTOMY RT/LT       C OPEN RX ANKLE DISLOCATN+FIXATN      right     COLONOSCOPY       COLONOSCOPY  3/13/2012    Procedure:COLONOSCOPY; COLONOSCOPY ; Surgeon:LUCAS MOODY; Location: GI     GYN SURGERY      hysterectomy     HC REMOVAL OF TONSILS,<13 Y/O       OPEN REDUCTION INTERNAL FIXATION ANKLE Right 3/30/2016    Procedure: OPEN REDUCTION INTERNAL FIXATION ANKLE;  Surgeon: Denis Gaffney MD;  Location:  OR             Social History:     Social History     Tobacco Use     Smoking status: Never Smoker     Smokeless tobacco: Never Used   Substance Use Topics     Alcohol use: Yes     Alcohol/week: 0.0 standard drinks     Comment: one drink per week             Family History:     Family History   Problem Relation Age of Onset     Other - See Comments Mother          at age 84 with cardiac disease      Myocardial Infarction Father          at age 70             Immunizations:     VACCINE/DOSE   Diptheria   DPT   DTAP   HBIG   Hepatitis A   Hepatitis B   HIB   Influenza   Measles   Meningococcal   MMR   Mumps   Pneumococcal   Polio   Rubella   Small Pox   TDAP   Varicella   Zoster             Allergies:     Allergies   Allergen Reactions     Sulfa Drugs      hives             Medications:     Current Facility-Administered Medications   Medication  "    heparin 25,000 units in 0.45% NaCl 250 mL ANTICOAGULANT  infusion     lactated ringers infusion     vancomycin (VANCOCIN) 2,000 mg in sodium chloride 0.9 % 500 mL intermittent infusion     Current Outpatient Medications   Medication Sig     aspirin 325 MG tablet Take 81 mg by mouth daily      Calcium Carbonate-Vitamin D (CALCIUM 600+D PO) Take 2 tablets by mouth daily      diltiazem (CARDIZEM CD; CARTIA XT) 360 MG 24 hr CD capsule Take 180 mg by mouth daily      donepezil (ARICEPT) 10 MG tablet Take 1 tablet (10 mg) by mouth every morning     LISINOPRIL PO Take 20 mg by mouth daily     memantine (NAMENDA) 10 MG tablet Take 1 tablet (10 mg) by mouth 2 times daily Use as instructed     Multiple Vitamins-Minerals (SENIOR MULTIVITAMIN PLUS) TABS Take 1 tablet by mouth daily      simvastatin (ZOCOR) 20 MG tablet Take 20 mg by mouth At Bedtime.     VITAMIN D PO Take 50,000 Units by mouth every 30 days      VITAMIN D, CHOLECALCIFEROL, PO Take 2,000 Units by mouth daily             Review of Systems:   CV: NEGATIVE for palpitations or peripheral edema  C: NEGATIVE for fever, chills, change in weight  E/M: NEGATIVE for ear, mouth and throat problems  R: NEGATIVE for significant cough           Physical Exam:   All vitals have been reviewed  Patient Vitals for the past 24 hrs:   BP Temp Temp src Pulse Resp SpO2 Height Weight   01/09/21 1715 (!) 159/97 -- -- 88 (!) 32 97 % -- --   01/09/21 1545 (!) 182/104 -- -- 82 (!) 32 93 % -- --   01/09/21 1530 (!) 188/103 -- -- 84 17 95 % -- --   01/09/21 1500 (!) 186/103 -- -- -- -- -- -- --   01/09/21 1341 (!) 145/87 97  F (36.1  C) Temporal -- -- 92 % 1.702 m (5' 7\") 90.5 kg (199 lb 8.3 oz)   01/09/21 1336 -- -- -- 84 (!) 34 (!) 86 % -- --     No intake or output data in the 24 hours ending 01/09/21 1817  Awake, conversant (limited, slow speech)  GCS E-4  V-5 M-6  Total -15  Old bruise right brow.   PERRL, arcus present  Grossly nl auditory acuity, nl pinnae  No gross nasal " deformity    Neck:   skin normal and no stridor, no palpable abnormality, non-tender     Chest / Breast:   Right chest tenderness, not well localized, no significant ecchymosis     Abdomen:   soft, non-distended, non-tender (mild tenderness at right costal margin), voluntary guarding absent and no masses palpated             Data:   All laboratory data reviewed  Results for orders placed or performed during the hospital encounter of 01/09/21   Head CT w/o contrast     Status: None    Narrative    CT SCAN OF THE HEAD WITHOUT CONTRAST   1/9/2021 2:47 PM     HISTORY: fall    TECHNIQUE:  Axial images of the head and coronal reformations without  IV contrast material. Radiation dose for this scan was reduced using  automated exposure control, adjustment of the mA and/or kV according  to patient size, or iterative reconstruction technique.    COMPARISON: 9/23/2019 PET/CT.    FINDINGS: There is no evidence of intracranial hemorrhage, mass, acute  infarct or anomaly. There is moderate generalized atrophy of the brain  with ventricular and sulcal prominence. There is patchy and confluent  low attenuation in the white matter of the cerebral hemispheres  consistent with moderately pronounced chronic small vessel ischemic  disease.     Mild to moderate left maxillary sinus membrane thickening and  scattered frontal sinus secretions, remainder paranasal sinuses and  mastoids are essentially clear. The bony calvarium and bones of the  skull base appear intact.       Impression    IMPRESSION:     1. No significant interval change, no acute intracranial pathology.  2. Moderate age-related changes.      LENARD GRUBBS MD   CT Chest (PE) Abdomen Pelvis w Contrast     Status: Abnormal   Result Value Ref Range    Radiologist flags Pulmonary embolism (AA)     Narrative    CT CHEST PE ABDOMEN AND PELVIS WITH CONTRAST 1/9/2021 4:41 PM    CLINICAL HISTORY: Hypoxia, elevated D-dimer, chest pain, sepsis,  dementia.    TECHNIQUE: CT  angiogram chest and routine CT abdomen pelvis with IV  contrast. Arterial phase through the chest and venous phase through  the abdomen and pelvis. 2D and 3D MIP reconstructions were preformed  by the CT technologist. Dose reduction techniques were used.     CONTRAST: 78mL [id number]-370    COMPARISON: None.    FINDINGS:  ANGIOGRAM CHEST: Extensive bilateral pulmonary emboli are present with  saddle embolism present on image 58 of series 7. Thoracic aorta is  negative for dissection. Straightening of the interventricular septum  is concerning for elevated right heart pressures.     LUNGS AND PLEURA: Trace amount of right pleural fluid is present.  Lungs are grossly clear. Small right pneumothorax is present. No  evidence of left pneumothorax.    MEDIASTINUM/AXILLAE: Heart is normal in size. No pericardial fluid.  Small hiatal hernia. Esophagus is unremarkable. No enlarged lymph  nodes.    HEPATOBILIARY: Normal.    PANCREAS: Normal.    SPLEEN: Normal.    ADRENAL GLANDS: Normal.    KIDNEYS/BLADDER: Kidneys are unremarkable. No hydronephrosis or  urinary tract calculi. Bladder is unremarkable but contains a small  focus of air potentially reflecting UTI.    BOWEL: No bowel obstruction or diverticulitis. Appendix not  visualized.    LYMPH NODES: No enlarged abdominal or pelvic lymph nodes.    PELVIC ORGANS: Rectum is distended with fecal debris. Uterus not  identified. No pelvic mass or free fluid.    OTHER: None.    MUSCULOSKELETAL: Multiple right-sided rib fractures are present likely  accounting for the trace right pleural effusion and small  pneumothorax.      Impression    IMPRESSION:  1.  Significant bilateral pulmonary emboli and saddle embolism. CT  findings suggest right heart strain. Thoracic and abdominal aorta are  unremarkable. Calcified atherosclerotic plaque is noted.    2.  Multiple right-sided rib fractures with trace pleural effusion and  small right pneumothorax. Left lung is clear.    3.  Small amount of  air in the bladder could indicate UTI, recent  catheterization or instrumentation. Kidneys are unremarkable. No  urinary tract calculi or hydronephrosis.    [Critical Result: Pulmonary embolism]    Finding was identified on 1/9/2021 4:49 PM.     Dr. Hutchison was contacted by me on 1/9/2021 4:58 PM and verbalized  understanding of the critical result.     NOLVIA IRVIN MD   US Lower Extremity Venous Duplex Bilateral     Status: None    Narrative    US LOWER EXTREMITY VENOUS DUPLEX BILATERAL 1/9/2021 4:45 PM    CLINICAL HISTORY: Bilateral lower extremity swelling. Elevated  D-dimer.    TECHNIQUE: Venous Duplex ultrasound of bilateral lower extremities  with and without compression, augmentation and duplex. Color flow and  spectral Doppler with waveform analysis performed.    COMPARISON: None.    FINDINGS: Exam includes the common femoral, femoral, popliteal veins  as well as segmentally visualized deep calf veins and greater  saphenous vein.     RIGHT: No deep vein thrombosis. No superficial thrombophlebitis. No  popliteal cyst.    LEFT: No deep vein thrombosis. No superficial thrombophlebitis. No  popliteal cyst.      Impression    IMPRESSION: No deep venous thrombosis in the bilateral lower  extremities.    NOLVIA IRVIN MD   Symptomatic Influenza A/B & SARS-CoV2 (COVID-19) Virus PCR Multiplex     Status: None    Specimen: Nasopharyngeal   Result Value Ref Range    Flu A/B & SARS-COV-2 PCR Source Nasopharyngeal     SARS-CoV-2 PCR Result NEGATIVE     Influenza A PCR Negative NEG^Negative    Influenza B PCR Negative NEG^Negative    Respiratory Syncytial Virus PCR (Note)     Flu A/B & SARS-CoV-2 PCR Comment (Note)    CBC with platelets differential     Status: Abnormal   Result Value Ref Range    WBC 14.3 (H) 4.0 - 11.0 10e9/L    RBC Count 4.20 3.8 - 5.2 10e12/L    Hemoglobin 12.9 11.7 - 15.7 g/dL    Hematocrit 41.0 35.0 - 47.0 %    MCV 98 78 - 100 fl    MCH 30.7 26.5 - 33.0 pg    MCHC 31.5 31.5 - 36.5 g/dL    RDW 14.3  10.0 - 15.0 %    Platelet Count 175 150 - 450 10e9/L    Diff Method Automated Method     % Neutrophils 78.2 %    % Lymphocytes 13.0 %    % Monocytes 5.8 %    % Eosinophils 0.8 %    % Basophils 0.2 %    % Immature Granulocytes 2.0 %    Nucleated RBCs 0 0 /100    Absolute Neutrophil 11.2 (H) 1.6 - 8.3 10e9/L    Absolute Lymphocytes 1.9 0.8 - 5.3 10e9/L    Absolute Monocytes 0.8 0.0 - 1.3 10e9/L    Absolute Eosinophils 0.1 0.0 - 0.7 10e9/L    Absolute Basophils 0.0 0.0 - 0.2 10e9/L    Abs Immature Granulocytes 0.3 0 - 0.4 10e9/L    Absolute Nucleated RBC 0.0    Basic metabolic panel     Status: Abnormal   Result Value Ref Range    Sodium 137 133 - 144 mmol/L    Potassium 4.8 3.4 - 5.3 mmol/L    Chloride 105 94 - 109 mmol/L    Carbon Dioxide 25 20 - 32 mmol/L    Anion Gap 7 3 - 14 mmol/L    Glucose 170 (H) 70 - 99 mg/dL    Urea Nitrogen 50 (H) 7 - 30 mg/dL    Creatinine 1.47 (H) 0.52 - 1.04 mg/dL    GFR Estimate 32 (L) >60 mL/min/[1.73_m2]    GFR Estimate If Black 37 (L) >60 mL/min/[1.73_m2]    Calcium 9.5 8.5 - 10.1 mg/dL   D dimer quantitative     Status: Abnormal   Result Value Ref Range    D Dimer >20.0 (HH) 0.0 - 0.50 ug/ml FEU   Troponin I     Status: Abnormal   Result Value Ref Range    Troponin I ES 0.358 (HH) 0.000 - 0.045 ug/L   Blood gas venous     Status: Abnormal   Result Value Ref Range    Ph Venous 7.36 7.32 - 7.43 pH    PCO2 Venous 46 40 - 50 mm Hg    PO2 Venous 21 (L) 25 - 47 mm Hg    Bicarbonate Venous 26 21 - 28 mmol/L    Base Deficit Venous 0.2 mmol/L    FIO2 5 Liters oxymask    UA with Microscopic     Status: Abnormal   Result Value Ref Range    Color Urine Yellow     Appearance Urine Slightly Cloudy     Glucose Urine Negative NEG^Negative mg/dL    Bilirubin Urine Negative NEG^Negative    Ketones Urine Trace (A) NEG^Negative mg/dL    Specific Gravity Urine 1.030 1.003 - 1.035    Blood Urine Negative NEG^Negative    pH Urine 6.0 5.0 - 7.0 pH    Protein Albumin Urine 70 (A) NEG^Negative mg/dL     Urobilinogen mg/dL 2.0 0.0 - 2.0 mg/dL    Nitrite Urine Negative NEG^Negative    Leukocyte Esterase Urine Large (A) NEG^Negative    Source Catheterized Urine     WBC Urine 103 (H) 0 - 5 /HPF    RBC Urine 38 (H) 0 - 2 /HPF    Bacteria Urine Few (A) NEG^Negative /HPF    Squamous Epithelial /HPF Urine 3 (H) 0 - 1 /HPF    Mucous Urine Present (A) NEG^Negative /LPF    Amorphous Crystals Few (A) NEG^Negative /HPF   Lactic acid whole blood     Status: Abnormal   Result Value Ref Range    Lactic Acid 3.3 (H) 0.7 - 2.0 mmol/L   Partial thromboplastin time     Status: None   Result Value Ref Range    PTT 36 22 - 37 sec   Lactic acid whole blood     Status: Abnormal   Result Value Ref Range    Lactic Acid 3.0 (H) 0.7 - 2.0 mmol/L   EKG 12-lead, tracing only     Status: None (Preliminary result)   Result Value Ref Range    Interpretation ECG Click View Image link to view waveform and result    EKG 12 lead     Status: None (Preliminary result)   Result Value Ref Range    Interpretation ECG Click View Image link to view waveform and result    Blood culture     Status: None (Preliminary result)    Specimen: Blood    Right Arm   Result Value Ref Range    Specimen Description Blood Right Arm     Culture Micro PENDING    Blood culture     Status: None (Preliminary result)    Specimen: Blood    Right Arm   Result Value Ref Range    Specimen Description Blood Right Arm     Culture Micro PENDING           Attestation:  I have reviewed today's vital signs, notes, medications, labs and imaging.  Amount of time performed on this consult: 60 minutes.    Antonio Koroma MD

## 2021-01-11 NOTE — PLAN OF CARE
Disoriented to time. Repositioned every 2hrs with assist of 2 and lift. VSS except on 4L O2 w/ NC. Tele: SR. PRN IV dilaudid given for R side upper and lower back pain w/ relief. Heparin infusing @1050, Recheck Hep 10A ordered @1500PM. BL LE edema. Large bruised on her right upper back. Regular diet with good appetite. PW in place. On IV Soyzn. Discontinue LR today. SW consult for discharge planing. PT is following. Will continue to monitor.     1430: MD paged: Pt. seems very restless, audible wheezing,O2 increased to 8L w/oxymask and RR 26.   PRN dilaudid and Tylenol given for her right ribs pain. Please advise.

## 2021-01-11 NOTE — PROGRESS NOTES
01/11/21 0900   Quick Adds   Type of Visit Initial PT Evaluation   Living Environment   People in home spouse   Current Living Arrangements house   Living Environment Comments Per CM note from 1/9: Patient lives in a home with her spouse. There is a basement. Pt typically uses a walker and spouse assists with all cares.    Self-Care   Current Activity Tolerance poor   Equipment Currently Used at Home grab bar, toilet;walker, rolling   Disability/Function   Walking or Climbing Stairs Difficulty yes   Walking or Climbing Stairs ambulation difficulty, requires equipment   Mobility Management Walker   Fall history within last six months yes   General Information   Onset of Illness/Injury or Date of Surgery 01/09/21   Referring Physician Ramirez KOEHLER   Patient/Family Therapy Goals Statement (PT) Not able to state.    Pertinent History of Current Problem (include personal factors and/or comorbidities that impact the POC) 86-year-old female with a history of dementia, HTN and HLD who was brought into the ED after a fall.  Patient was subsequently found to have UTI and a submassive PE with right heart strain. Found to have multiple right-sided rib fractures, trace pleural effusion and a right pneumothorax.    Existing Precautions/Restrictions fall   Cognition   Affect/Mental Status (Cognition) confused   Follows Commands (Cognition) follows one-step commands   Safety Deficit (Cognition) judgment;insight into deficits/self-awareness;awareness of need for assistance   Cognitive Status Comments Patient repeats thoughts throughout session.    Range of Motion (ROM)   ROM Comment no deficits noted with bilat UE or LE   Strength   Strength Comments Decreased globally; heavy assist with limited mobility as noted below.l    Bed Mobility   Comment (Bed Mobility) Supine>sit with modA   Balance   Balance Comments Poor static seated balance.    Clinical Impression   Criteria for Skilled Therapeutic Intervention yes, treatment  indicated   PT Diagnosis (PT) Impaired gait, generalized weakness   Influenced by the following impairments SOB/JOSUE, decreased strength, decreased activity tolerance, impaired cognition/safety   Functional limitations due to impairments Decreased IND with bed mobility, transfers, ambulation   Clinical Presentation Evolving/Changing   Clinical Presentation Rationale Medically mgmt continuing, confusion/cognition, multiple physical impairments   Clinical Decision Making (Complexity) low complexity   Therapy Frequency (PT) 5x/week   Predicted Duration of Therapy Intervention (days/wks) One week   Planned Therapy Interventions (PT) bed mobility training;gait training;patient/family education;strengthening;stair training;transfer training   Risk & Benefits of therapy have been explained evaluation/treatment results reviewed;care plan/treatment goals reviewed;participants included;patient   PT Discharge Planning    PT Discharge Recommendation (DC Rec) Transitional Care Facility   PT Rationale for DC Rec Significantly below baseline for mobility; will need TCU. If family not agreeable pt would likely need Ax2 for all mobility/cares,  PT/OT.    PT Brief overview of current status  Recommend lift.    Total Evaluation Time   Total Evaluation Time (Minutes) 4

## 2021-01-11 NOTE — PROGRESS NOTES
[facility]    Medicine Progress Note - Hospitalist Service       Date of Admission:  1/9/2021  Length of stay: 2 days    Assessment & Plan   Chris Craig is an 86-year-old female with a history of dementia, HTN and HLD who was brought into the ED after a fall.  Patient was subsequently found to have UTI and a submassive PE with right heart strain.    So far during the hospitalization, patient has been unable to be weaned from O2.  Remains on 4 L and saturating in the low 90s.  Having significant rib pain due to the fractures.  PT evaluated and recommended TCU placement.    Acute hypoxic respiratory failure  Acute saddle pulmonary embolism  - The patient was found to be hypoxic in the ED.  D-dimer was undetectably high.  She ended up undergoing a CT chest PE protocol.  This returned positive for bilateral pulmonary emboli with a saddle embolism.  CT findings were consistent with right heart strain.  Patient was not hypotensive.  Endovascular therapy and thrombolytics were discussed with family and they declined these aggressive measures.  Patient therefore will be treated only with anticoagulation.  - Her troponin was elevated at 0.3 consistent with type II process from the right heart strain.  - There is no reason to check an echocardiogram, as we would not act on any findings on it.  - Continue IV heparin for now while we are monitoring her clinical stability.  Ultimately, would anticipate her going on a NOAC.  - Patient is DNR.  Discussed with family and patient that the clinical picture can change quickly with the significant clot burden that she has.  - Wean oxygen as tolerated, but anticipate she might need to be discharged on O2    Fall  Right rib fractures  - Found to have multiple right-sided rib fractures, trace pleural effusion and a right pneumothorax.  This is secondary to the fall.  Need to have a close eye on her respiratory status because of her need for anticoagulation.  - Thoracic  surgery has been consulted.  Recommends twice daily hemoglobin checks and daily chest x-rays to monitor for hemothorax.  No operative management available due to her clinical tenuousness.  - Tylenol and dilaudid PRN for pain control  - Chest x-ray repeated on 1/11 shows small bilateral pleural effusions.    Acute kidney injury  - Creatinine on admission 1.47.  Baseline creatinine 1.15.  Creatinine has trended down to 1.20 with IV fluids.  Perhaps there was a component of hypovolemia/prerenal etiology contributing to her fall.    UTI  - Urine appears infected, air seen in the bladder.  Zosyn started empirically, will narrow based upon culture results.  - Urine culture growing 4 different species, final culture pending.  Continue Zosyn.    Debility  - Per family, patient has an unsteady gait at baseline and with this fall, she needs PT/OT evaluation.  Particularly given that she was independent with her . Those evaluations are pending.  Anticipate placement thereafter.    Dementia  - Appears grossly oriented, no sign of delirium right now.  Continue donepezil and memantine    HTN  - Continue patient's home diltiazem.  Lisinopril on hold..    Diet: Regular  DVT Prophylaxis: IV heparin  Malnutrition: No  Vasquez Catheter: No  Code Status: No CPR- Do NOT Intubate     Disposition Plan   Expected discharge:   Patient will need to discharge to TCU.  Want to watch her for another 24 hours or so, at that point, if pain is under appropriate control, could discharge.   consulted.    Radha with her son Rafi on 1/11.    Fercho Guzmán MD  Hospitalist Service  [facility]  ______________________________________________________________________    Interval History   Patient says she is having right-sided rib pain today.  Breathing looks to be short.  She says she wants to go home.    Data reviewed today: I reviewed all medications, new labs and imaging results over the last 24 hours.  "    Physical Exam   /59 (BP Location: Right arm)   Pulse 82   Temp 98.2  F (36.8  C) (Axillary)   Resp 20   Ht 1.702 m (5' 7\")   Wt 96.4 kg (212 lb 9.6 oz)   SpO2 93%   BMI 33.30 kg/m    212 lbs 9.6 oz       General: Appears uncomfortable.    HEENT: No scleral icterus. Oropharynx moist.     Neck: Supple. Normal range of motion.     Pulmonary: Normal work of breathing. Clear to auscultation bilaterally.  Tender over right chest.    Cardiovascular: Regular rate and rhythm without murmur or extra heart sounds.    Abdomen: Soft and non-tender.    Extremities: No peripheral edema. No clubbing or cyanosis.     Neurologic: Awake, alert, appropriate.    Skin: Warm and dry.    Psychiatric: Normal affect and mood.     Data    Recent Labs   Lab 01/11/21  0702 01/10/21  0649 01/09/21  1416   WBC 13.3* 10.8 14.3*   HGB 11.5* 11.1* 12.9   MCV 97 100 98    138* 175    139 137   POTASSIUM 4.2 4.6 4.8   CHLORIDE 107 107 105   CO2 23 26 25   BUN 29 39* 50*   CR 1.09* 1.20* 1.47*   ANIONGAP 7 6 7   MARÍA 8.2* 8.1* 9.5   * 115* 170*   TROPI  --   --  0.358*     Recent Results (from the past 24 hour(s))   XR Chest Port 1 View    Narrative    CHEST ONE VIEW PORTABLE   1/11/2021 8:29 AM     HISTORY: Shortness of breath. On AC with rib fracture, evaluate for  hemothorax.    COMPARISON: 1/9/2021      Impression    IMPRESSION: Multiple mildly displaced right-sided rib fractures are  demonstrated. There are small bilateral pleural effusions. No  pneumothorax.    YOUSIF REA MD       Medications      Current Facility-Administered Medications   Medication Dose Route Frequency     diltiazem ER COATED BEADS  180 mg Oral QPM     donepezil  10 mg Oral QAM     memantine  10 mg Oral BID     piperacillin-tazobactam  3.375 g Intravenous Q6H     simvastatin  20 mg Oral At Bedtime     sodium chloride (PF)  3 mL Intracatheter Q8H       "

## 2021-01-11 NOTE — PHARMACY-RX INSURANCE COVERAGE
Anticoagulation coverage check.  Patient has Medicare D through [email or web address] sponsored by an employer.    Xarelto/Eliquis:  $30/mo.      Enoxaparin 100mg x 10 syringes: $10.    Jantoven (warfarin): $1/mo.      DOROTHEA Solano, Pharmacy Technician/Liaison, Discharge Pharmacy, [phone]

## 2021-01-11 NOTE — PLAN OF CARE
VSS. Tele SR. PRN dilaudid and ice packs utilized for pain mgmt. Lung sounds diminished. Breathing appears tachypneic and shallow. Pt denies SOB. Oriented to time, person, and situation. Edema present in BLEs, worse on L side. Bruising present to R shoulder. Repositioned in bed q2hr overnight. HepXa level overnight: 0.53. Rate remains at 1050/hr. Recheck in AM. Purewick in place. Continue abx for UTI. Continue anticoagulation. Continue POC.

## 2021-01-11 NOTE — PLAN OF CARE
[id number]: Alert. Disoriented to time. C/o right sided pain but declined intervention. Heparin gtt @ 1050 units/hr- Hep 10A due @ 2345. LR infusing. Repositioned q 2 hrs. Skin intact. Purewick in place. Tele SR. Treating w/ Zosyn. Continue plan of care.    Yanna Corona RN on 1/10/2021 at 11:04 PM

## 2021-01-11 NOTE — PLAN OF CARE
OT: Orders received. Chart reviewed and discussed with PT. Pt not feeling well enough to participate in PT eval this AM d/t dizziness. Heavy Assist to come sitting EOB. Pt will require TCU.  Will defer OT eval to next level of care to optimize participation in evaluation. Will complete OT orders. No OT eval completed. PT to follow.  .

## 2021-01-11 NOTE — CONSULTS
Care Management Follow Up    Length of Stay (days): 2    Expected Discharge Date: 01/13/21     Concerns to be Addressed: discharge planning, decision making, care coordination/care conferences     Patient plan of care discussed at interdisciplinary rounds: Yes    Anticipated Discharge Disposition: Transitional Care     Anticipated Discharge Services: None  Anticipated Discharge DME: None    Patient/family educated on Medicare website which has current facility and service quality ratings: yes  Education Provided on the Discharge Plan:  Yes  Patient/Family in Agreement with the Plan: yes    Referrals Placed by CM/SW:  None  Private pay costs discussed: private room/amenity fees    Additional Information:  Spoke with jamie Mckee over the phone. Discussed recommendation for TCU and current need for assist of 2.   Rafi verified that patient is at home w/ . Patient has private pay HHA twice week, 4 hours each time.   Son Rafi agreeable to TCU. Discussed Medicare coverage of TCU.   Rafi will discuss recommendations with father and sister this evening. Rafi will contact  with decision.   Transportation TBD.     Fabi Gibson, RN      Fabi Gibson RN Case Manager  Inpatient Care Coordination  [facility]   [phone]

## 2021-01-12 NOTE — PLAN OF CARE
VSS. O2 increased to 6L overnight to maintain sats >92%. Breathing is shallow and tachypneic with RR in 20s. Pt appears pale and had episode of drenching sweats overnight. No fever. Pt endorses some pain intermittently and shows nonverbal signs of pain with repositioning. PRN dilaudid given. Gave pt a sip of water this AM and pt started coughing, appeared to have difficulty swallowing. Plan to keep NPO the rest of shift dt concern for aspiration. Pt appears to have increased upper airway secretions with some rattle-like breathing noted. No apnea. Lung sounds diminished. Bowel sounds hypoactive. LBM 1/9 - unable to administer Miralax dt swallowing issues. Will request day team obtain order for bisacodyl supp. Heparin gtt adjusted per RN managed order set. Redraw in AM. Pt is oriented to self only. Able to respond to yes/no questions but does not engage in meaningful conversation. Repositioned in bed q2hr. Bruising present to R shoulder. Updated MD Dr. Lepe re: pt's intermittent rattled breathing, dysphagia, shallow breathing, and increasing O2 needs. Continue POC. Requested palliative consult from medical team via sticky note in Epic.

## 2021-01-12 NOTE — PROGRESS NOTES
Hospice referral from LUCIE naylor.  Hospice liason will contact family tomorrow to arrange for a consult.  Please try to manage patient's symptoms with oral medication (hydromorphone preferred due to renal function).  Pt will need to be comfortable on oral medications 12-24 hours prior to discharge.      Thank you for this consult,  Alexander Parada RN  [facility] Hospice Referral Specialist  [phone]

## 2021-01-12 NOTE — PROGRESS NOTES
[facility]    Medicine Progress Note - Hospitalist Service       Date of Admission:  1/9/2021  Length of stay: 3 days    Assessment & Plan   Chris Craig is an 86-year-old female with a history of dementia, HTN and HLD who was brought into the ED after a fall.  Patient was subsequently found to have UTI and a submassive PE with right heart strain.    Today, patient's clinical status is worsening.  Has been placed  on 10 L overnight.  This morning, less interactive, more lethargic.  Patient looks very ill.  I discussed with her family.  I believe the patient is not going to survive this event.  I indicated it would be appropriate to pursue a comfort focused approach, and family was in agreement with that.  Patient will therefore be made comfort measures only at this point.  Family can visit.  Family would like her home with hospice if possible.  This seems reasonable at this point, given that her vital signs are relatively stable and she is on 6 L via nasal cannula.  Will reach out to  and attempt to arrange this.    Problem list as outlined below, though comfort measures only are in place    Acute hypoxic respiratory failure  Acute saddle pulmonary embolism  - The patient was found to be hypoxic in the ED.  D-dimer was undetectably high.  She ended up undergoing a CT chest PE protocol.  This returned positive for bilateral pulmonary emboli with a saddle embolism.  CT findings were consistent with right heart strain.  Patient was not hypotensive.  Endovascular therapy and thrombolytics were discussed with family and they declined these aggressive measures.  Patient therefore will be treated only with anticoagulation.  - Her troponin was elevated at 0.3 consistent with type II process from the right heart strain.  - There is no reason to check an echocardiogram, as we would not act on any findings on it.  - Continue IV heparin for now while we are monitoring her clinical stability.   Ultimately, would anticipate her going on a NOAC.  - Patient is DNR.  Discussed with family and patient that the clinical picture can change quickly with the significant clot burden that she has.  - Wean oxygen as tolerated, but anticipate she might need to be discharged on O2    Fall  Right rib fractures  - Found to have multiple right-sided rib fractures, trace pleural effusion and a right pneumothorax.  This is secondary to the fall.  Need to have a close eye on her respiratory status because of her need for anticoagulation.  - Thoracic surgery has been consulted.  Recommends twice daily hemoglobin checks and daily chest x-rays to monitor for hemothorax.  No operative management available due to her clinical tenuousness.  - Tylenol and dilaudid PRN for pain control  - Chest x-ray repeated on 1/11 shows small bilateral pleural effusions.    Acute kidney injury  - Creatinine on admission 1.47.  Baseline creatinine 1.15.  Creatinine has trended down to 1.20 with IV fluids.  Perhaps there was a component of hypovolemia/prerenal etiology contributing to her fall.    Sepsis due to UTI  - Urine appears infected, air seen in the bladder.  Zosyn started empirically, will narrow based upon culture results.  - Urine culture growing 4 different species, final culture pending.  Continue Zosyn.    Debility  - Per family, patient has an unsteady gait at baseline and with this fall, she needs PT/OT evaluation.  Particularly given that she was independent with her . Those evaluations are pending.  Anticipate placement thereafter.    Dementia  - Appears grossly oriented, no sign of delirium right now.  Continue donepezil and memantine    HTN  - Continue patient's home diltiazem.  Lisinopril on hold..    Type II MI  Troponin elevated to 0.398 on admission related to PE, type II process from the strain on the heart.  No further work-up given comfort care as above.    Diet: Regular  DVT Prophylaxis: IV heparin  Malnutrition:  "No  Vasquez Catheter: No  Code Status: No CPR- Do NOT Intubate     Disposition Plan   Comfort cares now. Family would like to look into home hospice.     Discussed with her son Rafi on 1/12.    Fercho Guzmán MD  Hospitalist Service  [facility]  ______________________________________________________________________    Interval History   More lethargic. Increasing O2 needs.  Less interactive.    Data reviewed today: I reviewed all medications, new labs and imaging results over the last 24 hours.     Physical Exam   /68 (BP Location: Right arm)   Pulse 74   Temp 97.9  F (36.6  C) (Axillary)   Resp 22   Ht 1.702 m (5' 7\")   Wt 96.3 kg (212 lb 3.2 oz)   SpO2 94%   BMI 33.24 kg/m    212 lbs 3.2 oz     General: Appears uncomfortable.  HEENT: No scleral icterus. Oropharynx moist.   Neck: Supple. Normal range of motion.   Pulmonary: Increased work of breathing.  Tender over right chest.  Cardiovascular: Regular rate and rhythm without murmur or extra heart sounds.  Abdomen: Soft and non-tender.  Extremities: No peripheral edema. No clubbing or cyanosis.   Neurologic: Awake, but not oriented.  Skin: Warm and dry.  Psychiatric: Normal affect and mood.     Data    Recent Labs   Lab 01/12/21  0620 01/11/21  0702 01/10/21  0649 01/09/21  1416   WBC 12.3* 13.3* 10.8 14.3*   HGB 11.2* 11.5* 11.1* 12.9   MCV 94 97 100 98    173 138* 175    137 139 137   POTASSIUM 4.5 4.2 4.6 4.8   CHLORIDE 107 107 107 105   CO2 23 23 26 25   BUN 30 29 39* 50*   CR 1.18* 1.09* 1.20* 1.47*   ANIONGAP 5 7 6 7   MARÍA 7.7* 8.2* 8.1* 9.5   * 109* 115* 170*   TROPI  --   --   --  0.358*     No results found for this or any previous visit (from the past 24 hour(s)).    Medications      Current Facility-Administered Medications   Medication Dose Route Frequency    sodium chloride (PF)  3 mL Intracatheter Q8H       "

## 2021-01-12 NOTE — PLAN OF CARE
DC pt's orientation, very lethargic today. 93% on 6L oxymask, all other VSS. Heparin gtt d/c'd and made comfort cares this morning. Family at bedside. IV diluadid given x2 for pain. Additional prn meds added for increased comfort. TQ2 or as tolerated. Frequent oral cares. Overnight nurses reported increased difficulty with swallowing. All PO meds held today r/t lethargy. Clinically declining. SW consult added for hospice coordination at discharge. Will continue POC

## 2021-01-12 NOTE — PLAN OF CARE
21:00 Has right rib fx and bruising on right upper back.  PRN dilaudid given.  Knows self and is able to answer simple questions.  Flat affect, slow response. Coarse lung sounds, JOSUE. Respiration rate range 24-32 with accessory muscle use.  Sats 92-92% 10L per oxy mask. (respirations improved with pain control) She denies shortness of breath.  Pt looks comfortable and does not appear distressed. Tele SR.  Regular diet.  Pur wick in place. Heparin drip infusing per orders for large saddle pulmonary embolism. TCU when ready to discharge. Thoracic following.  DNR/DNI.     22:24  Weaned down to 6L O2 per oxymask by end of shift. O2 sat 93%  resp 22.

## 2021-01-12 NOTE — PROGRESS NOTES
SPIRITUAL HEALTH SERVICES Progress Note  [facility] Med Surg 3    Spiritual Health visit per pt transitioned to comfort cares.  Pt is [employer]regational and member of [employer] in [place].   Pt spouse, Shaun, and son, Rafi are at the bedside and welcomed a prayer. Couple have five children. Others are en route.  Provide prayer blanket.    Shaun and Rafi requested outreach to [facility] to contact.  Spoke with Sr. Paredes who will call Rafi.    Oriented to Spiritual Health Services for support during hospital stay.    Plan: Will continue to follow while on unit.    Nestor Paredes MA  Staff   Pager: [phone]  Phone: [phone]

## 2021-01-12 NOTE — PLAN OF CARE
Care Management Discharge Note    Discharge Date: 01/13/21  Expected Time of Departure: Wed. 1/13/21 afternoon    Discharge Disposition: Hospice, Home    Discharge Services: Transportation Services    Discharge DME: Oxygen, Bed Hospice will order    Discharge Transportation: agency, stretcher with O2    Private pay costs discussed: transportation costs    PAS Confirmation Code:  NA  Patient/family educated on Medicare website which has current facility and service quality ratings: yes    Education Provided on the Discharge Plan:  yes home with family caring for patient and hospice assistance  Persons Notified of Discharge Plans:  and 3 children  Patient/Family in Agreement with the Plan: yes    Handoff Referral Completed: No    Additional Information:  Met with patient and family (, and 3 children) to discuss disposition planning.  Discussed options, family has decided that they would like to take their mother home and care for her with the assistance of hospice care. Discussed Medicare Compare list for Hospice.  Discussed associated Medicare star ratings to assist with choice for referrals/discharge planning Yes    Education was given to pt/family that star ratings are updated/maintained by Medicare and can be reviewed by visiting www.medicare.gov Yes    Family decided on [facility], call place to intake, [phone]. They will meet with family on Wed and make plans for patient to go home on hospice.    ILA Templeton   Inpatient Care Coordination   Supervisor  [facility]  [phone]        STAN Cerda

## 2021-01-12 NOTE — PLAN OF CARE
Physical Therapy Discharge Summary    Reason for therapy discharge:    Change in medical status.  Change to comfort.     Progress towards therapy goal(s). See goals on Care Plan in [facility] electronic health record for goal details.  Goals not met.  Barriers to achieving goals:   change in goals of care.    Therapy recommendation(s):    Defer to medical team.

## 2021-01-13 NOTE — PROVIDER NOTIFICATION
MD paged. FYI discharge pharmacy called, DILAUDID high concentration  oral solution don't do here, but long term does.  Thank you.

## 2021-01-13 NOTE — PLAN OF CARE
Unable to assess orientation, lethargic.  Repositioned with assist of 2. On  O2 4L w/oxy mask sats 90-92%. Tachyapnic at times. PRN Dilaudid and Morphine given for intermittently nonverbal signs of pain. SW following for hospice care at discharge. Will continue monitor.

## 2021-01-13 NOTE — PROGRESS NOTES
Care Management Discharge Note    Discharge Date: 01/14/21  Expected Time of Departure: Wed. 1/13/21 afternoon    Discharge Disposition: Hospice, Home    Discharge Services: Transportation Services    Discharge DME: Oxygen, Bed    Discharge Transportation: agency    Private pay costs discussed: transportation costs   Reviewed out of pocket cost for [facility] stretcher transport, $1042.75 for base rate and $25 per mile to the destination. Pt/family expressed understanding and are agreeable to this.      Patient requires stretcher transportation due to hospice needs and unable to control her O2.    Stretcher transportation has been arranged for 1/14 at 1400. Patient and bedside nurse notified of transportation time.    PAS Confirmation Code:  N/A  Patient/family educated on Medicare website which has current facility and service quality ratings: yes    Education Provided on the Discharge Plan:  Yes  Persons Notified of Discharge Plans: Pt's son Rafi  Patient/Family in Agreement with the Plan: yes    Handoff Referral Completed: No    Additional Information:  Derrick received a call from [facility] with [facility] updating sw that they are able to meet the pt at her home tomorrow at 1300 to admit to services.  The pt's POLST was completed today.  The pt's hospice equipment will be delivered today.  Derrick spoke with the pt's son Rafi to discuss transport.  He is agreeable to stretcher transport.    Derrick arranged transport for 1400 tomorrow, that was the earliest available.  Derrick will follow-up in the morning to determine if there is a cancellation and the pt can discharge sooner.    Derrick will continue with discharge planning and will be available as needed until discharge.    Derrick updated Rafi on the pt's discharge time.    Dottie López, FRANNY, [facility]  Inpatient Care Coordination  [facility]  [phone]

## 2021-01-13 NOTE — PROGRESS NOTES
Received call from Dottie ramon pt status with hospice discharge planning. Informed pt continues to receive IV pain medications with last dose this AM. Hospice requests pain and dyspnea be managed with oral medications 12-24hours prior to discharge to ensure symptoms are managed. Plan will be to use oral pain meds and likely discharge tomorrow 1/14/20.     Received PC from pt's daughter Arlene asking about process for hospice. Informed hospice has received referral and is being reviewed by medical directors. Plan to meet with family for hospice consult at 1pm today.     Thank you for this referral and opportunity to work with this family and team.     Hilary Hendrix RN BSN PHN PN  Hospice Referral Specialist  [facility]    [phone]  [email or web address]

## 2021-01-13 NOTE — PROGRESS NOTES
[facility]    Medicine Progress Note - Hospitalist Service       Date of Admission:  1/9/2021  Length of stay: 4 days    Assessment & Plan   Chris Craig is an 86-year-old female with a history of dementia, HTN and HLD who was brought into the ED after a fall.  Patient was subsequently found to have UTI and a submassive PE with right heart strain.    Patient was monitored for 48 hours and placed on IV heparin.  Initially, seemed to be somewhat stable, but later  had decline in oxygenation, increased work of breathing and overall worsening clinical status.  These events were very concerning in light of her known saddle embolism.  On 1/12, discussed with family and decision was made to pursue comfort measures only.  Hospice consult placed.  Patient is on oral medicines.  If managing symptoms appropriate and oral medications, can discharge to home with hospice tomorrow.    Problem list as outlined below, though comfort measures only are in place    Acute hypoxic respiratory failure  Acute saddle pulmonary embolism  The patient was found to be hypoxic in the ED.  D-dimer was undetectably high.  She was found to have bilateral pulmonary emboli with a saddle embolism.  CT findings were consistent with right heart strain.  Patient was not hypotensive.  Endovascular therapy and thrombolytics were discussed with family and they declined these aggressive measures.  Patient therefore will be treated only with anticoagulation.  Patient demonstrated worsening clinical status during the hospitalization with  increasing O2 needs.  On 1/12, care was discussed with family and it was felt that comfort measures only were most appropriate.    Fall  Right rib fractures  Found to have multiple right-sided rib fractures, trace pleural effusion and a right pneumothorax.  This is secondary to the fall.  Need to have a close eye on her respiratory status because of her need for anticoagulation. Thoracic surgery was  "consulted.  Recommends twice daily hemoglobin checks and daily chest x-rays to monitor for hemothorax.  No operative management available due to her clinical tenuousness. Chest x-ray repeated on 1/11 showed small bilateral pleural effusions.    Acute kidney injury  - Creatinine on admission 1.47.  Baseline creatinine 1.15.  Creatinine has trended down to 1.20 with IV fluids.  Possible component of hypovolemia/prerenal etiology contributing to her fall.    Sepsis due to UTI  Urine appeared infected, air seen in the bladder.  Zosyn started empirically. Urine culture growing 4 orgs. Zosyn stopped when she was moved to comfort cares.     Type II MI  - Troponin elevated to 0.398 on admission related to PE, type II process from the strain on the heart.  No further work-up given comfort care as above.    Debility  Dementia  HTN    Diet: Regular  DVT Prophylaxis: IV heparin  Malnutrition: No  Vasquez Catheter: No  Code Status: No CPR- Do NOT Intubate     Disposition Plan   Comfort cares now. Hospice meeting on 1/13. Plan for home with hospice on 1/14.     Discussed with her son Rafi on 1/12.    Fercho Guzmán MD  Hospitalist Service  [facility]  ______________________________________________________________________    Interval History   Awake, able to communicate. Labored breathing.     Data reviewed today: I reviewed all medications, new labs and imaging results over the last 24 hours.     Physical Exam   /68 (BP Location: Right arm)   Pulse 78   Temp 97.9  F (36.6  C) (Axillary)   Resp 20   Ht 1.702 m (5' 7\")   Wt 96.3 kg (212 lb 3.2 oz)   SpO2 91%   BMI 33.24 kg/m    212 lbs 3.2 oz       General: Appears uncomfortable.    HEENT: No scleral icterus. Oropharynx moist.     Neck: Supple. Normal range of motion.     Pulmonary: Increased work of breathing.  Tender over right chest.    Cardiovascular: Regular rate and rhythm without murmur or extra heart sounds.    Abdomen: Soft and " non-tender.    Extremities: No peripheral edema. No clubbing or cyanosis.     Neurologic: Awake, but not oriented.    Skin: Warm and dry.    Psychiatric: Normal affect and mood.     Data    Recent Labs   Lab 01/13/21  0617 01/12/21  0620 01/11/21  0702 01/10/21  0649 01/09/21  1416   WBC  --  12.3* 13.3* 10.8 14.3*   HGB  --  11.2* 11.5* 11.1* 12.9   MCV  --  94 97 100 98   PLT  --  169 173 138* 175   NA  --  135 137 139 137   POTASSIUM  --  4.5 4.2 4.6 4.8   CHLORIDE  --  107 107 107 105   CO2  --  23 23 26 25   BUN  --  30 29 39* 50*   CR 1.10* 1.18* 1.09* 1.20* 1.47*   ANIONGAP  --  5 7 6 7   MARÍA  --  7.7* 8.2* 8.1* 9.5   GLC  --  104* 109* 115* 170*   TROPI  --   --   --   --  0.358*     No results found for this or any previous visit (from the past 24 hour(s)).    Medications      Current Facility-Administered Medications   Medication Dose Route Frequency     sodium chloride (PF)  3 mL Intracatheter Q8H

## 2021-01-13 NOTE — PLAN OF CARE
Pt on comfort cares. O2 sats 92% on 10L oxymask. Breathing labored at times with tachypnea. Pt endorses pain intermittently and shows nonverbal signs of pain. Repositioned in bed as tolerated for comfort. Pt opens eyes to voice and responds with 1 word responses. Oriented to place and person. Scant amt dark urine output overnight. Atropine ordered from pharmacy for increased oral secretions this AM. Oral cares provided. Continue comfort cares until pt discharges on hospice.

## 2021-01-13 NOTE — PLAN OF CARE
DC pt's orientation. On comfort cares. On 6L oxymask. Turn q2hrs, or as tolerable. Swallowing improved today, able to take a few drinks of lemonade without difficulty. Switched to po meds for pain control and air hunger. Gave multiple doses of morphine and dilaudid- see MAR. Hospice meeting today, plan to discharge home tomorrow on hospice. Purwick in place with minimal output. Will continue POC.

## 2021-01-13 NOTE — PROGRESS NOTES
SPIRITUAL HEALTH SERVICES Progress Note  [facility] Med Surg 3    [facility].  Sr. Paredes, [employer] , is aware that family is meeting with hospice today and pt may discharge to home.  She has been in contact with Rafi lazo, who has stated family would prefer anointing from [facility] once pt has returned home.    Plan: Spiritual Health Services remains available for additional emotional/spiritual support.    Nestor Paredes MA  Staff   Pager: [phone]  Phone: [phone]

## 2021-01-14 NOTE — PLAN OF CARE
Diminished LS with labored breathing. +2 edema to RLE. +3 edema to LLE. Confused. Pain med's for complaints of pain. Complained of pain when turned, therefore minimally turned. Comfort cares. Slept well throughout the night.

## 2021-01-14 NOTE — PROGRESS NOTES
Patient seen and examined.  Patient lethargic and somnolent.  Does opens eyes to tactile stimuli.  Discussed case with nursing staff.  Currently on about 7 L of oxygen via facemask satting in the low 90s.  Also called son at home and they have oxygen set up already yesterday.  Do see that discharge medications for hospice and comfort meds have been ordered and filled.  Okay to discharge home.  All questions from son were answered.  Transfer arrangements per social work with plan on home with stretcher.

## 2021-01-14 NOTE — PROGRESS NOTES
Medication will now be delivered to pt home from [facility] pharmacy. Reviewed with Medical director and requests pt only be on hydromorphone, not both hydromorphone and morphine. Called Rafi, son and updated re delivery. RN will meet pt at home at 2:30 PM to complete hospice admission. COPY of POLST on chart. Please send with transport.      Care coordinated with Dottie FAULKNER. Thank you for your care and assistance with this pt and family.

## 2021-01-14 NOTE — PLAN OF CARE
Admitting Diagnosis:Fall, elevated troponin.  Living Situation: lives with family  Pain plan: denies pain   Mobility: assistance, 2 people/lift  Baseline activity:with equipment   Alarms/Safety: BA   LDA's: PIV/ two   Pertinent test results: K+ 4.5, cr: 1.10. trop. 0.358.  Consults: Thoracic surgery following.  Abnormals/Pending: none  Other Cares/Comments:pt alert to self, comfort  LS diminished. care no vital signs turn q 2 hrs. Pure wick in place. 02 6L  oxy mask.   Discharge Plan: home tomorrow on hospice.   Discharge Time:TBD

## 2021-01-15 NOTE — DISCHARGE SUMMARY
[facility]       DISCHARGE SUMMARY          Chris Craig MRN# [id number]   YOB: 1934 Age: 86 year old     Date of Admission:  1/9/2021  Date of Discharge:  1/14/2021  2:34 PM  Admitting Physician:  Coral Polanco MD  Discharge Physician: Luis Schumacher MD  Discharging Service: Hospitalist     Primary Provider: Ti Howard  Primary Care Physician Phone Number: [phone]         Discharge Diagnoses/Problem Oriented Hospital Course (Providers):      Chris Craig was admitted on 1/9/2021 by Coral Polanco MD and I would refer you to their history and physical.      Patient was seen and examined on the day of discharge    Discharge Diagnoses    1. Acute hypoxic respiratory failure secondary to acute submassive saddle pulmonary embolism  2. Small right-sided pneumothorax secondary to rib fractures  3. Mechanical fall with multiple right-sided rib fractures  4. Acute kidney injury on chronic kidney disease stage II  5. Suspected acute sepsis secondary to UTI  6. Type II MI  7. Dementia  8. Progressive debility  9. Hypertension      Hospital Course:    Acute hypoxic respiratory failure  Acute saddle pulmonary embolism  Chris Craig is an 86-year-old female with a history of dementia, HTN and HLD who was brought into the ED after a fall.  Patient was subsequently found to have UTI and a submassive PE with right heart strain.     Patient was monitored for 48 hours and placed on IV heparin.  Initially, seemed to be somewhat stable, but later  had decline in oxygenation, increased work of breathing and overall worsening clinical status.  These events were very concerning in light of her known saddle embolism.  On 1/12, discussed with family and decision was made to pursue comfort measures only.    Patient was transitioned to comfort measures and palliative care was consulted.  Hospice was arranged and patient will be discharged to home hospice with O2  supplementation for comfort measures.  On the day of discharge, her FiO2 requirements were 6-7 L on facemask as patient was lethargic and somnolent for the most part.  Home O2 other has been ordered and already arranged the day prior to discharge.     Fall  Right rib fractures  Patient was found to have multiple right-sided rib fractures, trace pleural effusion and a right pneumothorax.  This is secondary to the fall.  Her respiratory status was closely monitored because of the need for her anticoagulation for her submassive PE.  Thoracic surgery was consulted and recommended only conservative management and hemoglobins twice daily with serial daily x-rays.  Once the decision was made to make her comfort cares, no further serial imaging or lab draws were warranted.  Only supportive cares.       Acute kidney injury  Creatinine on admission 1.47.  Baseline creatinine 1.15.  Creatinine has trended down to 1.20 with IV fluids.  Possible component of hypovolemia/prerenal etiology contributing to her fall.     Suspected acute sepsis due to UTI  Urine appeared infected, air seen in the bladder.  Zosyn started empirically. Urine culture growing 4 orgs. Zosyn stopped when she was moved to comfort cares.      Type II MI  Incidentally, troponin elevated to 0.398 on admission and likely related to PE, type II process from the strain on the heart.  No further work-up or treatment given comfort care as above.            Pending Results:        Unresulted Labs Ordered in the Past 30 Days of this Admission     Date and Time Order Name Status Description    1/9/2021 1431 Blood culture Preliminary     1/9/2021 1340 Blood culture Preliminary             Discharge Instructions and Follow-Up:      Follow-up Appointments     Follow-up and recommended labs and tests       W/ hospice               Discharge Disposition:        Discharged to home with home hospice         Discharge Medications:        Discharge Medication List as of  1/14/2021  2:05 PM      START taking these medications    Details   acetaminophen (TYLENOL) 650 MG suppository Place 1 suppository (650 mg) rectally every 6 hours as needed for fever or mild pain (temperature over 100F), Disp-12 suppository, R-0, E-Prescribe      atropine 1 % ophthalmic solution Place 1-2 drops under the tongue every hour as needed for other (secretions), Disp-1 Bottle, R-0, E-Prescribe      bisacodyl (DULCOLAX) 10 MG suppository Place 1 suppository (10 mg) rectally daily as needed for constipation, Disp-5 suppository, R-0, E-Prescribe      HYDROmorphone (DILAUDID) 4 mg/0.4 mL (HIGH CONC) oral solution Place 0.1-0.2 mLs (1-2 mg) under the tongue every 2 hours as needed for moderate to severe pain, Disp-2.4 mL, R-0, Local Print      morphine 10 MG/5ML solution Take 2.5-5 mLs (5-10 mg) by mouth every hour as needed for moderate to severe pain (or dyspnea), Disp-100 mL, R-0, Local Print         STOP taking these medications       aspirin 325 MG tablet Comments:   Reason for Stopping:         Calcium Carbonate-Vitamin D (CALCIUM 600+D PO) Comments:   Reason for Stopping:         diltiazem (CARDIZEM CD; CARTIA XT) 360 MG 24 hr CD capsule Comments:   Reason for Stopping:         donepezil (ARICEPT) 10 MG tablet Comments:   Reason for Stopping:         LISINOPRIL PO Comments:   Reason for Stopping:         memantine (NAMENDA) 10 MG tablet Comments:   Reason for Stopping:         Multiple Vitamins-Minerals (SENIOR MULTIVITAMIN PLUS) TABS Comments:   Reason for Stopping:         simvastatin (ZOCOR) 20 MG tablet Comments:   Reason for Stopping:         VITAMIN D PO Comments:   Reason for Stopping:         VITAMIN D, CHOLECALCIFEROL, PO Comments:   Reason for Stopping:                 Allergies:         Allergies   Allergen Reactions     Sulfa Drugs      hives           Consultations This Hospital Stay:        Consultation during this admission received from thoracic surgery           Image Results From This  Hospital Stay (For Non-EPIC Providers):        Results for orders placed or performed during the hospital encounter of 01/09/21   Head CT w/o contrast    Narrative    CT SCAN OF THE HEAD WITHOUT CONTRAST   1/9/2021 2:47 PM     HISTORY: fall    TECHNIQUE:  Axial images of the head and coronal reformations without  IV contrast material. Radiation dose for this scan was reduced using  automated exposure control, adjustment of the mA and/or kV according  to patient size, or iterative reconstruction technique.    COMPARISON: 9/23/2019 PET/CT.    FINDINGS: There is no evidence of intracranial hemorrhage, mass, acute  infarct or anomaly. There is moderate generalized atrophy of the brain  with ventricular and sulcal prominence. There is patchy and confluent  low attenuation in the white matter of the cerebral hemispheres  consistent with moderately pronounced chronic small vessel ischemic  disease.     Mild to moderate left maxillary sinus membrane thickening and  scattered frontal sinus secretions, remainder paranasal sinuses and  mastoids are essentially clear. The bony calvarium and bones of the  skull base appear intact.       Impression    IMPRESSION:     1. No significant interval change, no acute intracranial pathology.  2. Moderate age-related changes.      LENARD GRUBBS MD   CT Chest (PE) Abdomen Pelvis w Contrast     Value    Radiologist flags Pulmonary embolism (AA)    Narrative    CT CHEST PE ABDOMEN AND PELVIS WITH CONTRAST 1/9/2021 4:41 PM    CLINICAL HISTORY: Hypoxia, elevated D-dimer, chest pain, sepsis,  dementia.    TECHNIQUE: CT angiogram chest and routine CT abdomen pelvis with IV  contrast. Arterial phase through the chest and venous phase through  the abdomen and pelvis. 2D and 3D MIP reconstructions were preformed  by the CT technologist. Dose reduction techniques were used.     CONTRAST: 78mL [id number]    COMPARISON: None.    FINDINGS:  ANGIOGRAM CHEST: Extensive bilateral pulmonary emboli  are present with  saddle embolism present on image 58 of series 7. Thoracic aorta is  negative for dissection. Straightening of the interventricular septum  is concerning for elevated right heart pressures.     LUNGS AND PLEURA: Trace amount of right pleural fluid is present.  Lungs are grossly clear. Small right pneumothorax is present. No  evidence of left pneumothorax.    MEDIASTINUM/AXILLAE: Heart is normal in size. No pericardial fluid.  Small hiatal hernia. Esophagus is unremarkable. No enlarged lymph  nodes.    HEPATOBILIARY: Normal.    PANCREAS: Normal.    SPLEEN: Normal.    ADRENAL GLANDS: Normal.    KIDNEYS/BLADDER: Kidneys are unremarkable. No hydronephrosis or  urinary tract calculi. Bladder is unremarkable but contains a small  focus of air potentially reflecting UTI.    BOWEL: No bowel obstruction or diverticulitis. Appendix not  visualized.    LYMPH NODES: No enlarged abdominal or pelvic lymph nodes.    PELVIC ORGANS: Rectum is distended with fecal debris. Uterus not  identified. No pelvic mass or free fluid.    OTHER: None.    MUSCULOSKELETAL: Multiple right-sided rib fractures are present likely  accounting for the trace right pleural effusion and small  pneumothorax.      Impression    IMPRESSION:  1.  Significant bilateral pulmonary emboli and saddle embolism. CT  findings suggest right heart strain. Thoracic and abdominal aorta are  unremarkable. Calcified atherosclerotic plaque is noted.    2.  Multiple right-sided rib fractures with trace pleural effusion and  small right pneumothorax. Left lung is clear.    3.  Small amount of air in the bladder could indicate UTI, recent  catheterization or instrumentation. Kidneys are unremarkable. No  urinary tract calculi or hydronephrosis.    [Critical Result: Pulmonary embolism]    Finding was identified on 1/9/2021 4:49 PM.     Dr. Hutchison was contacted by me on 1/9/2021 4:58 PM and verbalized  understanding of the critical result.     NOLVIA IRVIN MD    US Lower Extremity Venous Duplex Bilateral    Narrative    US LOWER EXTREMITY VENOUS DUPLEX BILATERAL 1/9/2021 4:45 PM    CLINICAL HISTORY: Bilateral lower extremity swelling. Elevated  D-dimer.    TECHNIQUE: Venous Duplex ultrasound of bilateral lower extremities  with and without compression, augmentation and duplex. Color flow and  spectral Doppler with waveform analysis performed.    COMPARISON: None.    FINDINGS: Exam includes the common femoral, femoral, popliteal veins  as well as segmentally visualized deep calf veins and greater  saphenous vein.     RIGHT: No deep vein thrombosis. No superficial thrombophlebitis. No  popliteal cyst.    LEFT: No deep vein thrombosis. No superficial thrombophlebitis. No  popliteal cyst.      Impression    IMPRESSION: No deep venous thrombosis in the bilateral lower  extremities.    NOLVIA IRVIN MD   XR Chest Port 1 View    Narrative    CHEST ONE VIEW PORTABLE   1/11/2021 8:29 AM     HISTORY: Shortness of breath. On AC with rib fracture, evaluate for  hemothorax.    COMPARISON: 1/9/2021      Impression    IMPRESSION: Multiple mildly displaced right-sided rib fractures are  demonstrated. There are small bilateral pleural effusions. No  pneumothorax.    YOUSIF REA MD

## 2021-01-17 NOTE — PROGRESS NOTES
Dear [employer],    We are notifying you of a  Hospice Death.    Chris Craig; MRN [id number]   peacefully On date 21  Time 0205  Sincerely [facility]  Tanja Bryant  [phone]